# Patient Record
Sex: MALE | Race: OTHER | Employment: UNEMPLOYED | URBAN - METROPOLITAN AREA
[De-identification: names, ages, dates, MRNs, and addresses within clinical notes are randomized per-mention and may not be internally consistent; named-entity substitution may affect disease eponyms.]

---

## 2017-02-28 ENCOUNTER — APPOINTMENT (OUTPATIENT)
Dept: CV DIAGNOSTICS | Facility: HOSPITAL | Age: 52
DRG: 309 | End: 2017-02-28
Attending: INTERNAL MEDICINE
Payer: MEDICAID

## 2017-02-28 ENCOUNTER — APPOINTMENT (OUTPATIENT)
Dept: GENERAL RADIOLOGY | Facility: HOSPITAL | Age: 52
DRG: 309 | End: 2017-02-28
Attending: EMERGENCY MEDICINE
Payer: MEDICAID

## 2017-02-28 ENCOUNTER — HOSPITAL ENCOUNTER (INPATIENT)
Facility: HOSPITAL | Age: 52
LOS: 1 days | Discharge: HOME | DRG: 309 | End: 2017-03-01
Attending: EMERGENCY MEDICINE | Admitting: INTERNAL MEDICINE
Payer: MEDICAID

## 2017-02-28 DIAGNOSIS — I48.91 ATRIAL FIBRILLATION WITH RVR (HCC): Primary | ICD-10-CM

## 2017-02-28 DIAGNOSIS — F10.10 ALCOHOL ABUSE: ICD-10-CM

## 2017-02-28 PROBLEM — I10 ESSENTIAL HYPERTENSION: Status: ACTIVE | Noted: 2017-02-28

## 2017-02-28 PROBLEM — R73.9 HYPERGLYCEMIA: Status: ACTIVE | Noted: 2017-02-28

## 2017-02-28 LAB
ALBUMIN SERPL BCP-MCNC: 3.5 G/DL (ref 3.5–4.8)
ALBUMIN/GLOB SERPL: 1 {RATIO} (ref 1–2)
ALP SERPL-CCNC: 73 U/L (ref 32–100)
ALT SERPL-CCNC: 49 U/L (ref 17–63)
ANION GAP SERPL CALC-SCNC: 6 MMOL/L (ref 0–18)
ANION GAP SERPL CALC-SCNC: 9 MMOL/L (ref 0–18)
APTT PPP: 31.1 SECONDS (ref 23.2–35.3)
AST SERPL-CCNC: 33 U/L (ref 15–41)
BASOPHILS # BLD: 0.1 K/UL (ref 0–0.2)
BASOPHILS NFR BLD: 1 %
BILIRUB SERPL-MCNC: 0.9 MG/DL (ref 0.3–1.2)
BUN SERPL-MCNC: 13 MG/DL (ref 8–20)
BUN SERPL-MCNC: 14 MG/DL (ref 8–20)
BUN/CREAT SERPL: 18.3 (ref 10–20)
BUN/CREAT SERPL: 18.9 (ref 10–20)
CALCIUM SERPL-MCNC: 8.3 MG/DL (ref 8.5–10.5)
CALCIUM SERPL-MCNC: 8.5 MG/DL (ref 8.5–10.5)
CHLORIDE SERPL-SCNC: 106 MMOL/L (ref 95–110)
CHLORIDE SERPL-SCNC: 110 MMOL/L (ref 95–110)
CO2 SERPL-SCNC: 24 MMOL/L (ref 22–32)
CO2 SERPL-SCNC: 26 MMOL/L (ref 22–32)
CREAT SERPL-MCNC: 0.71 MG/DL (ref 0.5–1.5)
CREAT SERPL-MCNC: 0.74 MG/DL (ref 0.5–1.5)
EOSINOPHIL # BLD: 0.1 K/UL (ref 0–0.7)
EOSINOPHIL NFR BLD: 1 %
ERYTHROCYTE [DISTWIDTH] IN BLOOD BY AUTOMATED COUNT: 15.9 % (ref 11–15)
GLOBULIN PLAS-MCNC: 3.6 G/DL (ref 2.5–3.7)
GLUCOSE SERPL-MCNC: 117 MG/DL (ref 70–99)
GLUCOSE SERPL-MCNC: 97 MG/DL (ref 70–99)
HBA1C MFR BLD: 6 % (ref 4–6)
HCT VFR BLD AUTO: 44.7 % (ref 41–52)
HGB BLD-MCNC: 15.1 G/DL (ref 13.5–17.5)
INR BLD: 1.1 (ref 0.9–1.2)
LYMPHOCYTES # BLD: 1.6 K/UL (ref 1–4)
LYMPHOCYTES NFR BLD: 20 %
MAGNESIUM SERPL-MCNC: 1.7 MG/DL (ref 1.8–2.5)
MCH RBC QN AUTO: 29.9 PG (ref 27–32)
MCHC RBC AUTO-ENTMCNC: 33.8 G/DL (ref 32–37)
MCV RBC AUTO: 88.3 FL (ref 80–100)
MONOCYTES # BLD: 0.7 K/UL (ref 0–1)
MONOCYTES NFR BLD: 9 %
NEUTROPHILS # BLD AUTO: 5.3 K/UL (ref 1.8–7.7)
NEUTROPHILS NFR BLD: 68 %
OSMOLALITY UR CALC.SUM OF ELEC: 292 MOSM/KG (ref 275–295)
OSMOLALITY UR CALC.SUM OF ELEC: 292 MOSM/KG (ref 275–295)
PLATELET # BLD AUTO: 204 K/UL (ref 140–400)
PMV BLD AUTO: 9.3 FL (ref 7.4–10.3)
POTASSIUM SERPL-SCNC: 3.5 MMOL/L (ref 3.3–5.1)
POTASSIUM SERPL-SCNC: 3.8 MMOL/L (ref 3.3–5.1)
PROT SERPL-MCNC: 7.1 G/DL (ref 5.9–8.4)
PROTHROMBIN TIME: 13.7 SECONDS (ref 11.8–14.5)
RBC # BLD AUTO: 5.06 M/UL (ref 4.5–5.9)
SODIUM SERPL-SCNC: 140 MMOL/L (ref 136–144)
SODIUM SERPL-SCNC: 141 MMOL/L (ref 136–144)
TROPONIN I SERPL-MCNC: 0.01 NG/ML (ref ?–0.03)
TSH SERPL-ACNC: 2.09 UIU/ML (ref 0.34–5.6)
WBC # BLD AUTO: 7.8 K/UL (ref 4–11)

## 2017-02-28 PROCEDURE — 84132 ASSAY OF SERUM POTASSIUM: CPT | Performed by: INTERNAL MEDICINE

## 2017-02-28 PROCEDURE — 71010 XR CHEST AP PORTABLE  (CPT=71010): CPT

## 2017-02-28 PROCEDURE — 96376 TX/PRO/DX INJ SAME DRUG ADON: CPT

## 2017-02-28 PROCEDURE — 83735 ASSAY OF MAGNESIUM: CPT | Performed by: HOSPITALIST

## 2017-02-28 PROCEDURE — 96365 THER/PROPH/DIAG IV INF INIT: CPT

## 2017-02-28 PROCEDURE — 93306 TTE W/DOPPLER COMPLETE: CPT

## 2017-02-28 PROCEDURE — 99285 EMERGENCY DEPT VISIT HI MDM: CPT

## 2017-02-28 PROCEDURE — 83036 HEMOGLOBIN GLYCOSYLATED A1C: CPT | Performed by: INTERNAL MEDICINE

## 2017-02-28 PROCEDURE — 80307 DRUG TEST PRSMV CHEM ANLYZR: CPT | Performed by: HOSPITALIST

## 2017-02-28 PROCEDURE — 85730 THROMBOPLASTIN TIME PARTIAL: CPT | Performed by: HOSPITALIST

## 2017-02-28 PROCEDURE — 96366 THER/PROPH/DIAG IV INF ADDON: CPT

## 2017-02-28 PROCEDURE — 80048 BASIC METABOLIC PNL TOTAL CA: CPT | Performed by: EMERGENCY MEDICINE

## 2017-02-28 PROCEDURE — 85610 PROTHROMBIN TIME: CPT | Performed by: HOSPITALIST

## 2017-02-28 PROCEDURE — 93005 ELECTROCARDIOGRAM TRACING: CPT

## 2017-02-28 PROCEDURE — 80053 COMPREHEN METABOLIC PANEL: CPT | Performed by: EMERGENCY MEDICINE

## 2017-02-28 PROCEDURE — 84443 ASSAY THYROID STIM HORMONE: CPT | Performed by: INTERNAL MEDICINE

## 2017-02-28 PROCEDURE — 93306 TTE W/DOPPLER COMPLETE: CPT | Performed by: INTERNAL MEDICINE

## 2017-02-28 PROCEDURE — 84484 ASSAY OF TROPONIN QUANT: CPT | Performed by: EMERGENCY MEDICINE

## 2017-02-28 PROCEDURE — 93010 ELECTROCARDIOGRAM REPORT: CPT | Performed by: EMERGENCY MEDICINE

## 2017-02-28 PROCEDURE — 85025 COMPLETE CBC W/AUTO DIFF WBC: CPT | Performed by: EMERGENCY MEDICINE

## 2017-02-28 RX ORDER — FOLIC ACID 1 MG/1
1 TABLET ORAL DAILY
Status: DISCONTINUED | OUTPATIENT
Start: 2017-02-28 | End: 2017-03-01

## 2017-02-28 RX ORDER — MELATONIN
100 DAILY
Status: DISCONTINUED | OUTPATIENT
Start: 2017-03-01 | End: 2017-03-01

## 2017-02-28 RX ORDER — ACETAMINOPHEN 325 MG/1
650 TABLET ORAL EVERY 6 HOURS PRN
Status: DISCONTINUED | OUTPATIENT
Start: 2017-02-28 | End: 2017-03-01

## 2017-02-28 RX ORDER — IBUPROFEN 600 MG/1
1800 TABLET ORAL
Status: ON HOLD | COMMUNITY
End: 2017-03-01

## 2017-02-28 RX ORDER — DILTIAZEM HYDROCHLORIDE 5 MG/ML
INJECTION INTRAVENOUS
Status: COMPLETED
Start: 2017-02-28 | End: 2017-02-28

## 2017-02-28 RX ORDER — MELATONIN
100 ONCE
Status: COMPLETED | OUTPATIENT
Start: 2017-02-28 | End: 2017-02-28

## 2017-02-28 RX ORDER — DILTIAZEM HYDROCHLORIDE 5 MG/ML
10 INJECTION INTRAVENOUS ONCE
Status: COMPLETED | OUTPATIENT
Start: 2017-02-28 | End: 2017-02-28

## 2017-02-28 RX ORDER — MAGNESIUM OXIDE 400 MG (241.3 MG MAGNESIUM) TABLET
400 TABLET ONCE
Status: COMPLETED | OUTPATIENT
Start: 2017-02-28 | End: 2017-02-28

## 2017-02-28 RX ORDER — BISACODYL 10 MG
10 SUPPOSITORY, RECTAL RECTAL
Status: DISCONTINUED | OUTPATIENT
Start: 2017-02-28 | End: 2017-03-01

## 2017-02-28 RX ORDER — POTASSIUM CHLORIDE 20 MEQ/1
40 TABLET, EXTENDED RELEASE ORAL EVERY 4 HOURS
Status: COMPLETED | OUTPATIENT
Start: 2017-02-28 | End: 2017-02-28

## 2017-02-28 RX ORDER — POLYETHYLENE GLYCOL 3350 17 G/17G
17 POWDER, FOR SOLUTION ORAL DAILY PRN
Status: DISCONTINUED | OUTPATIENT
Start: 2017-02-28 | End: 2017-03-01

## 2017-02-28 RX ORDER — MULTIPLE VITAMINS W/ MINERALS TAB 9MG-400MCG
1 TAB ORAL DAILY
Status: DISCONTINUED | OUTPATIENT
Start: 2017-02-28 | End: 2017-03-01

## 2017-02-28 RX ORDER — ONDANSETRON 2 MG/ML
4 INJECTION INTRAMUSCULAR; INTRAVENOUS EVERY 6 HOURS PRN
Status: DISCONTINUED | OUTPATIENT
Start: 2017-02-28 | End: 2017-03-01

## 2017-02-28 RX ORDER — LORAZEPAM 2 MG/ML
1 INJECTION INTRAMUSCULAR
Status: DISCONTINUED | OUTPATIENT
Start: 2017-02-28 | End: 2017-03-01

## 2017-02-28 RX ORDER — LOSARTAN POTASSIUM 100 MG/1
100 TABLET ORAL DAILY
Status: DISCONTINUED | OUTPATIENT
Start: 2017-02-28 | End: 2017-03-01

## 2017-02-28 RX ORDER — DOCUSATE SODIUM 100 MG/1
100 CAPSULE, LIQUID FILLED ORAL 2 TIMES DAILY
Status: DISCONTINUED | OUTPATIENT
Start: 2017-02-28 | End: 2017-03-01

## 2017-02-28 RX ORDER — LORAZEPAM 1 MG/1
1 TABLET ORAL
Status: DISCONTINUED | OUTPATIENT
Start: 2017-02-28 | End: 2017-03-01

## 2017-02-28 RX ORDER — LORAZEPAM 2 MG/1
2 TABLET ORAL
Status: DISCONTINUED | OUTPATIENT
Start: 2017-02-28 | End: 2017-03-01

## 2017-02-28 RX ORDER — HEPARIN SODIUM 5000 [USP'U]/ML
5000 INJECTION, SOLUTION INTRAVENOUS; SUBCUTANEOUS EVERY 8 HOURS
Status: DISCONTINUED | OUTPATIENT
Start: 2017-02-28 | End: 2017-03-01

## 2017-02-28 RX ORDER — LORAZEPAM 2 MG/ML
2 INJECTION INTRAMUSCULAR
Status: DISCONTINUED | OUTPATIENT
Start: 2017-02-28 | End: 2017-03-01
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
## 2017-02-28 NOTE — ED INITIAL ASSESSMENT (HPI)
Pt stated that he experience SOB and palpitation starting at 10 pm last night associated with dizziness, denies chest pain. Pt stated that he also experience fever and chills starting last night.

## 2017-02-28 NOTE — H&P
[address] Patient Status:  Inpatient    1965 MRN [id number]   Location [facility] 3W/SW Attending Sohan Blanco MD   Hosp Day # 0 KEELY Bunn     Date:  2017  Date of Denies arthralgias and myalgias   NEURO: Denies weakness, numbness, headaches, lightheadedness, dizziness   PSYCH: Denies symptoms of depression and anxiety   HEMATOLOGY:  Denies h/o anemia; denies bruising or excessive bleeding   ENDOCRINE:  Denies excess Alcohol abuse    Atrial fibrillation ([facility] [address]    Essential hypertension  Resolved Problems:    * No resolved hospital problems. *      - afib management per cardiology.   - on metoprolol for rate control.  - monitor off cardizem drip  - cont eliquis  - cont lo

## 2017-02-28 NOTE — ED NOTES
Pt remained tachycardic despite 2nd diltiazem 10 mg bolus. Dr Mary Frias notified. Diltiazem infusion ordered and started. Pt updated on plan of care. Doris Friedman, primary RN notified.
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
## 2017-03-01 VITALS
DIASTOLIC BLOOD PRESSURE: 80 MMHG | WEIGHT: 315 LBS | SYSTOLIC BLOOD PRESSURE: 157 MMHG | HEIGHT: 70 IN | HEART RATE: 72 BPM | RESPIRATION RATE: 16 BRPM | OXYGEN SATURATION: 98 % | BODY MASS INDEX: 45.1 KG/M2 | TEMPERATURE: 98 F

## 2017-03-01 LAB
ANION GAP SERPL CALC-SCNC: 7 MMOL/L (ref 0–18)
BASOPHILS # BLD: 0 K/UL (ref 0–0.2)
BASOPHILS NFR BLD: 1 %
BUN SERPL-MCNC: 14 MG/DL (ref 8–20)
BUN/CREAT SERPL: 18.7 (ref 10–20)
CALCIUM SERPL-MCNC: 8.6 MG/DL (ref 8.5–10.5)
CHLORIDE SERPL-SCNC: 107 MMOL/L (ref 95–110)
CO2 SERPL-SCNC: 26 MMOL/L (ref 22–32)
CREAT SERPL-MCNC: 0.75 MG/DL (ref 0.5–1.5)
EOSINOPHIL # BLD: 0.1 K/UL (ref 0–0.7)
EOSINOPHIL NFR BLD: 3 %
ERYTHROCYTE [DISTWIDTH] IN BLOOD BY AUTOMATED COUNT: 15.7 % (ref 11–15)
GLUCOSE SERPL-MCNC: 122 MG/DL (ref 70–99)
HCT VFR BLD AUTO: 42.6 % (ref 41–52)
HGB BLD-MCNC: 14.2 G/DL (ref 13.5–17.5)
LYMPHOCYTES # BLD: 1.8 K/UL (ref 1–4)
LYMPHOCYTES NFR BLD: 34 %
MAGNESIUM SERPL-MCNC: 1.8 MG/DL (ref 1.8–2.5)
MCH RBC QN AUTO: 29.8 PG (ref 27–32)
MCHC RBC AUTO-ENTMCNC: 33.3 G/DL (ref 32–37)
MCV RBC AUTO: 89.5 FL (ref 80–100)
MONOCYTES # BLD: 0.8 K/UL (ref 0–1)
MONOCYTES NFR BLD: 15 %
NEUTROPHILS # BLD AUTO: 2.6 K/UL (ref 1.8–7.7)
NEUTROPHILS NFR BLD: 48 %
OSMOLALITY UR CALC.SUM OF ELEC: 292 MOSM/KG (ref 275–295)
PLATELET # BLD AUTO: 182 K/UL (ref 140–400)
PMV BLD AUTO: 9.7 FL (ref 7.4–10.3)
POTASSIUM SERPL-SCNC: 3.8 MMOL/L (ref 3.3–5.1)
POTASSIUM SERPL-SCNC: 4 MMOL/L (ref 3.3–5.1)
RBC # BLD AUTO: 4.77 M/UL (ref 4.5–5.9)
SODIUM SERPL-SCNC: 140 MMOL/L (ref 136–144)
WBC # BLD AUTO: 5.4 K/UL (ref 4–11)

## 2017-03-01 PROCEDURE — 85025 COMPLETE CBC W/AUTO DIFF WBC: CPT | Performed by: HOSPITALIST

## 2017-03-01 PROCEDURE — 80048 BASIC METABOLIC PNL TOTAL CA: CPT | Performed by: HOSPITALIST

## 2017-03-01 PROCEDURE — 83735 ASSAY OF MAGNESIUM: CPT | Performed by: INTERNAL MEDICINE

## 2017-03-01 RX ORDER — METOPROLOL TARTRATE 50 MG/1
50 TABLET, FILM COATED ORAL 2 TIMES DAILY
Qty: 60 TABLET | Refills: 0 | Status: SHIPPED | OUTPATIENT
Start: 2017-03-01 | End: 2017-07-18

## 2017-03-01 RX ORDER — LOSARTAN POTASSIUM 100 MG/1
100 TABLET ORAL DAILY
Qty: 30 TABLET | Refills: 0 | Status: SHIPPED | OUTPATIENT
Start: 2017-03-01 | End: 2017-07-18

## 2017-03-01 RX ORDER — MAGNESIUM OXIDE 400 MG (241.3 MG MAGNESIUM) TABLET
400 TABLET ONCE
Status: COMPLETED | OUTPATIENT
Start: 2017-03-01 | End: 2017-03-01

## 2017-03-01 NOTE — DISCHARGE SUMMARY
[place] [facility] - [facility]    Discharge Summary    Bhumika Dumont Patient Status:  Inpatient    1965 MRN [id number]   Location [facility] 3W/SW Attending No att. providers found   [facility] Day # 1 PCP Gabino Stovall     Date of Admission:  tablet (5 mg total) by mouth 2 (two) times daily. , Normal, Disp-60 tablet, R-0    metoprolol Tartrate 50 MG Oral Tab  Take 1 tablet (50 mg total) by mouth 2 (two) times daily. , Normal, Disp-60 tablet, R-0      Home Meds - Modified    losartan 100 MG Oral T

## 2017-03-01 NOTE — PLAN OF CARE
Problem: Patient/Family Goals  Goal: Patient/Family Long Term Goal  Patient’s Long Term Goal: To get involved in a local [employer] for support to stop drinking alcohol.     Interventions:  - met with patent  - See additional Care Plan goals for specifi

## 2017-03-02 ENCOUNTER — TELEPHONE (OUTPATIENT)
Dept: CARDIOLOGY UNIT | Facility: HOSPITAL | Age: 52
End: 2017-03-02

## 2017-03-07 NOTE — PAYOR COMM NOTE
Attending Physician: No att. providers found    Review Type: ADMISSION   Reviewer:  Ileana Zhong       Date: March 7, 2017 - 3:15 PM  Payor: [address] [place] [address]  Authorization Number: [id number]  Admit date: 2/28/2017  3:03 AM   Adm Current:/67 mmHg  Pulse 110  Temp(Src) 97.8 °F (36.6 °C) (Temporal)  Resp 20  Ht 177.8 cm (5' 10\")  Wt 179.171 kg  BMI 56.68 kg/m2  SpO2 96%        Physical Exam   Constitutional: He is oriented to person, place, and time.  He appears well-deve for these tests on the individual orders. RAINBOW DRAW BLUE   RAINBOW DRAW GOLD   RAINBOW DRAW LAVENDER   RAINBOW DRAW LIGHT GREEN   RAINBOW DRAW DARK GREEN   RAINBOW DRAW LAVENDER TALL (BNP)      EKG    Rate, intervals and axes as noted on EKG Report. presents with:  Dyspnea TORITO SOB (respiratory)      HPI:   This is a 48 yo male with PMH of HTN who presented with palpitations, sob, and chest tightness. He notes he had multiple drinks of alcohol on Saturday night and was hung over on Sunday.   Last night wt loss   ALLERGY/IMMUN: Denies food or seasonal allergies       Physical Exam:   /91 mmHg  Pulse 89  Temp(Src) 97.6 °F (36.4 °C) (Oral)  Resp 20  Ht 5' 10\" (1.778 m)  Wt 393 lb 3.2 oz (178.354 kg)  BMI 56.42 kg/m2  SpO2 98%     GENERAL: [employer] MD  2/28/2017           Electronically signed by Julissa Farnsworth MD on 2/28/2017  1:11 PM        REVIEWER COMMENTS:     OTHER:

## 2017-04-13 ENCOUNTER — PRIOR ORIGINAL RECORDS (OUTPATIENT)
Dept: OTHER | Age: 52
End: 2017-04-13

## 2017-04-13 LAB
ANALYZER ANC (IANC): NORMAL
ANION GAP SERPL CALC-SCNC: 14 MMOL/L (ref 10–20)
BASOPHILS # BLD: 0 THOUSAND/MCL (ref 0–0.3)
BASOPHILS NFR BLD: 0 %
BNP SERPL-MCNC: 85 PG/ML
BUN SERPL-MCNC: 16 MG/DL (ref 6–20)
BUN/CREAT SERPL: 23 (ref 7–25)
CALCIUM SERPL-MCNC: 8.6 MG/DL (ref 8.4–10.2)
CHLORIDE: 106 MMOL/L (ref 98–107)
CO2 SERPL-SCNC: 26 MMOL/L (ref 21–32)
CREAT SERPL-MCNC: 0.7 MG/DL (ref 0.67–1.17)
D DIMER PPP FEU-MCNC: 0.35 MG/L FEU
DIFFERENTIAL METHOD BLD: NORMAL
EOSINOPHIL # BLD: 0.3 THOUSAND/MCL (ref 0.1–0.5)
EOSINOPHIL NFR BLD: 4 %
ERYTHROCYTE [DISTWIDTH] IN BLOOD: 14.9 % (ref 11–15)
GLUCOSE SERPL-MCNC: 133 MG/DL (ref 65–99)
HEMATOCRIT: 41 % (ref 39–51)
HGB BLD-MCNC: 13.9 GM/DL (ref 13–17)
LYMPHOCYTES # BLD: 2.5 THOUSAND/MCL (ref 1–4)
LYMPHOCYTES NFR BLD: 33 %
MCH RBC QN AUTO: 30.2 PG (ref 26–34)
MCHC RBC AUTO-ENTMCNC: 33.9 GM/DL (ref 32–36.5)
MCV RBC AUTO: 89.1 FL (ref 78–100)
MONOCYTES # BLD: 0.9 THOUSAND/MCL (ref 0.3–0.9)
MONOCYTES NFR BLD: 11 %
NEUTROPHILS # BLD: 4.1 THOUSAND/MCL (ref 1.8–7.7)
NEUTROPHILS NFR BLD: 52 %
NEUTS SEG NFR BLD: NORMAL %
PERCENT NRBC: NORMAL
PLATELET # BLD: 200 THOUSAND/MCL (ref 140–450)
POTASSIUM SERPL-SCNC: 4.1 MMOL/L (ref 3.4–5.1)
RBC # BLD: 4.6 MILLION/MCL (ref 4.5–5.9)
SODIUM SERPL-SCNC: 142 MMOL/L (ref 135–145)
TROPONIN I SERPL HS-MCNC: <0.02 NG/ML
WBC # BLD: 7.8 THOUSAND/MCL (ref 4.2–11)

## 2017-04-14 ENCOUNTER — PRIOR ORIGINAL RECORDS (OUTPATIENT)
Dept: OTHER | Age: 52
End: 2017-04-14

## 2017-04-14 ENCOUNTER — HOSPITAL (OUTPATIENT)
Dept: OTHER | Age: 52
End: 2017-04-14
Attending: FAMILY MEDICINE

## 2017-04-14 LAB
ALBUMIN SERPL-MCNC: 3.3 GM/DL (ref 3.6–5.1)
ALBUMIN/GLOB SERPL: 0.8 {RATIO} (ref 1–2.4)
ALP SERPL-CCNC: 82 UNIT/L (ref 45–117)
ALT SERPL-CCNC: 34 UNIT/L
ANION GAP SERPL CALC-SCNC: 13 MMOL/L (ref 10–20)
AST SERPL-CCNC: 20 UNIT/L
BILIRUB SERPL-MCNC: 0.3 MG/DL (ref 0.2–1)
BUN SERPL-MCNC: 14 MG/DL (ref 6–20)
BUN/CREAT SERPL: 22 (ref 7–25)
CALCIUM SERPL-MCNC: 8.4 MG/DL (ref 8.4–10.2)
CHLORIDE: 108 MMOL/L (ref 98–107)
CK SERPL-CCNC: 234 UNIT/L (ref 39–308)
CK SERPL-CCNC: 252 UNIT/L (ref 39–308)
CO2 SERPL-SCNC: 24 MMOL/L (ref 21–32)
CREAT SERPL-MCNC: 0.64 MG/DL (ref 0.67–1.17)
GLOBULIN SER-MCNC: 3.9 GM/DL (ref 2–4)
GLUCOSE SERPL-MCNC: 115 MG/DL (ref 65–99)
POTASSIUM SERPL-SCNC: 4.2 MMOL/L (ref 3.4–5.1)
PROT SERPL-MCNC: 7.2 GM/DL (ref 6.4–8.2)
SODIUM SERPL-SCNC: 141 MMOL/L (ref 135–145)
TROPONIN I SERPL HS-MCNC: <0.02 NG/ML
TROPONIN I SERPL HS-MCNC: <0.02 NG/ML

## 2017-04-15 ENCOUNTER — DIAGNOSTIC TRANS (OUTPATIENT)
Dept: OTHER | Age: 52
End: 2017-04-15

## 2017-06-29 PROBLEM — R73.9 HYPERGLYCEMIA: Status: RESOLVED | Noted: 2017-02-28 | Resolved: 2017-06-29

## 2017-06-29 PROBLEM — I48.91 ATRIAL FIBRILLATION WITH RVR (HCC): Status: RESOLVED | Noted: 2017-02-28 | Resolved: 2017-06-29

## 2017-06-29 PROBLEM — I48.91 ATRIAL FIBRILLATION (HCC): Status: RESOLVED | Noted: 2017-02-28 | Resolved: 2017-06-29

## 2017-06-29 PROBLEM — E78.5 HYPERLIPIDEMIA, UNSPECIFIED HYPERLIPIDEMIA TYPE: Status: ACTIVE | Noted: 2017-06-29

## 2017-06-30 ENCOUNTER — APPOINTMENT (OUTPATIENT)
Dept: LAB | Age: 52
End: 2017-06-30
Attending: INTERNAL MEDICINE
Payer: MEDICAID

## 2017-06-30 PROCEDURE — 36415 COLL VENOUS BLD VENIPUNCTURE: CPT

## 2017-06-30 PROCEDURE — 80053 COMPREHEN METABOLIC PANEL: CPT | Performed by: INTERNAL MEDICINE

## 2017-06-30 PROCEDURE — 84153 ASSAY OF PSA TOTAL: CPT

## 2017-06-30 PROCEDURE — 80061 LIPID PANEL: CPT | Performed by: INTERNAL MEDICINE

## 2017-07-03 ENCOUNTER — PRIOR ORIGINAL RECORDS (OUTPATIENT)
Dept: OTHER | Age: 52
End: 2017-07-03

## 2017-07-11 LAB
ALBUMIN: 3.3 G/DL
ALKALINE PHOSPHATATE(ALK PHOS): 82 IU/L
BILIRUBIN TOTAL: 0.3 MG/DL
BNP: 85 PMOL/L
CALCIUM: 8.4 MG/DL
CREATININE KINASE: 234 U/L
CREATININE KINASE: 252 U/L
GLOBULIN: 3.9 G/DL
GLUCOSE: 115 MG/DL
HEMATOCRIT: 41 %
HEMOGLOBIN: 13.9 G/DL
PLATELETS: 200 K/UL
PROTEIN, TOTAL: 7.2 G/DL
RED BLOOD COUNT: 4.6 X 10-6/U
SGOT (AST): 20 IU/L
SGPT (ALT): 34 IU/L
WHITE BLOOD COUNT: 7.8 X 10-3/U

## 2017-07-18 PROBLEM — G89.29 CHRONIC PAIN OF BOTH KNEES: Status: ACTIVE | Noted: 2017-07-18

## 2017-07-18 PROBLEM — G89.29 CHRONIC MIDLINE LOW BACK PAIN WITHOUT SCIATICA: Status: ACTIVE | Noted: 2017-07-18

## 2017-07-18 PROBLEM — M25.561 CHRONIC PAIN OF BOTH KNEES: Status: ACTIVE | Noted: 2017-07-18

## 2017-07-18 PROBLEM — M54.50 CHRONIC MIDLINE LOW BACK PAIN WITHOUT SCIATICA: Status: ACTIVE | Noted: 2017-07-18

## 2017-07-18 PROBLEM — M25.562 CHRONIC PAIN OF BOTH KNEES: Status: ACTIVE | Noted: 2017-07-18

## 2017-07-20 ENCOUNTER — PRIOR ORIGINAL RECORDS (OUTPATIENT)
Dept: OTHER | Age: 52
End: 2017-07-20

## 2017-09-17 ENCOUNTER — HOSPITAL ENCOUNTER (EMERGENCY)
Facility: HOSPITAL | Age: 52
Discharge: HOME | End: 2017-09-17
Attending: EMERGENCY MEDICINE
Payer: MEDICAID

## 2017-09-17 ENCOUNTER — APPOINTMENT (OUTPATIENT)
Dept: GENERAL RADIOLOGY | Facility: HOSPITAL | Age: 52
End: 2017-09-17
Payer: MEDICAID

## 2017-09-17 VITALS
HEIGHT: 69 IN | SYSTOLIC BLOOD PRESSURE: 160 MMHG | TEMPERATURE: 99 F | DIASTOLIC BLOOD PRESSURE: 89 MMHG | OXYGEN SATURATION: 96 % | BODY MASS INDEX: 46.65 KG/M2 | HEART RATE: 84 BPM | RESPIRATION RATE: 19 BRPM | WEIGHT: 315 LBS

## 2017-09-17 DIAGNOSIS — F10.10 ALCOHOL ABUSE: ICD-10-CM

## 2017-09-17 DIAGNOSIS — R00.2 PALPITATIONS: Primary | ICD-10-CM

## 2017-09-17 LAB
ALBUMIN SERPL BCP-MCNC: 3.6 G/DL (ref 3.5–4.8)
ALP SERPL-CCNC: 72 U/L (ref 32–100)
ALT SERPL-CCNC: 30 U/L (ref 17–63)
ANION GAP SERPL CALC-SCNC: 9 MMOL/L (ref 0–18)
AST SERPL-CCNC: 36 U/L (ref 15–41)
BASOPHILS # BLD: 0 K/UL (ref 0–0.2)
BASOPHILS NFR BLD: 1 %
BILIRUB DIRECT SERPL-MCNC: 0.2 MG/DL (ref 0–0.2)
BILIRUB SERPL-MCNC: 0.9 MG/DL (ref 0.3–1.2)
BUN SERPL-MCNC: 14 MG/DL (ref 8–20)
BUN/CREAT SERPL: 20 (ref 10–20)
CALCIUM SERPL-MCNC: 8.3 MG/DL (ref 8.5–10.5)
CHLORIDE SERPL-SCNC: 107 MMOL/L (ref 95–110)
CO2 SERPL-SCNC: 25 MMOL/L (ref 22–32)
CREAT SERPL-MCNC: 0.7 MG/DL (ref 0.5–1.5)
EOSINOPHIL # BLD: 0 K/UL (ref 0–0.7)
EOSINOPHIL NFR BLD: 1 %
ERYTHROCYTE [DISTWIDTH] IN BLOOD BY AUTOMATED COUNT: 15 % (ref 11–15)
ETHANOL SERPL-MCNC: 84 MG/DL
GLUCOSE SERPL-MCNC: 102 MG/DL (ref 70–99)
HCT VFR BLD AUTO: 41.7 % (ref 41–52)
HGB BLD-MCNC: 14.2 G/DL (ref 13.5–17.5)
INR BLD: 1.2 (ref 0.9–1.2)
LIPASE SERPL-CCNC: 19 U/L (ref 22–51)
LYMPHOCYTES # BLD: 1.7 K/UL (ref 1–4)
LYMPHOCYTES NFR BLD: 32 %
MCH RBC QN AUTO: 30.4 PG (ref 27–32)
MCHC RBC AUTO-ENTMCNC: 34 G/DL (ref 32–37)
MCV RBC AUTO: 89.4 FL (ref 80–100)
MONOCYTES # BLD: 0.7 K/UL (ref 0–1)
MONOCYTES NFR BLD: 13 %
NEUTROPHILS # BLD AUTO: 2.7 K/UL (ref 1.8–7.7)
NEUTROPHILS NFR BLD: 53 %
OSMOLALITY UR CALC.SUM OF ELEC: 293 MOSM/KG (ref 275–295)
PLATELET # BLD AUTO: 162 K/UL (ref 140–400)
PMV BLD AUTO: 8.8 FL (ref 7.4–10.3)
POTASSIUM SERPL-SCNC: 3.5 MMOL/L (ref 3.3–5.1)
PROT SERPL-MCNC: 6.9 G/DL (ref 5.9–8.4)
PROTHROMBIN TIME: 14.3 SECONDS (ref 11.8–14.5)
RBC # BLD AUTO: 4.67 M/UL (ref 4.5–5.9)
SODIUM SERPL-SCNC: 141 MMOL/L (ref 136–144)
TROPONIN I SERPL-MCNC: 0.03 NG/ML (ref ?–0.03)
WBC # BLD AUTO: 5.2 K/UL (ref 4–11)

## 2017-09-17 PROCEDURE — 93010 ELECTROCARDIOGRAM REPORT: CPT | Performed by: EMERGENCY MEDICINE

## 2017-09-17 PROCEDURE — 80076 HEPATIC FUNCTION PANEL: CPT | Performed by: EMERGENCY MEDICINE

## 2017-09-17 PROCEDURE — 83690 ASSAY OF LIPASE: CPT | Performed by: EMERGENCY MEDICINE

## 2017-09-17 PROCEDURE — 36415 COLL VENOUS BLD VENIPUNCTURE: CPT

## 2017-09-17 PROCEDURE — 80320 DRUG SCREEN QUANTALCOHOLS: CPT | Performed by: EMERGENCY MEDICINE

## 2017-09-17 PROCEDURE — 85610 PROTHROMBIN TIME: CPT | Performed by: EMERGENCY MEDICINE

## 2017-09-17 PROCEDURE — 85025 COMPLETE CBC W/AUTO DIFF WBC: CPT | Performed by: EMERGENCY MEDICINE

## 2017-09-17 PROCEDURE — 99285 EMERGENCY DEPT VISIT HI MDM: CPT

## 2017-09-17 PROCEDURE — 93005 ELECTROCARDIOGRAM TRACING: CPT

## 2017-09-17 PROCEDURE — 84484 ASSAY OF TROPONIN QUANT: CPT | Performed by: EMERGENCY MEDICINE

## 2017-09-17 PROCEDURE — 80048 BASIC METABOLIC PNL TOTAL CA: CPT | Performed by: EMERGENCY MEDICINE

## 2017-09-17 PROCEDURE — 71010 XR CHEST AP PORTABLE  (CPT=71010): CPT | Performed by: EMERGENCY MEDICINE

## 2017-09-17 RX ORDER — LORAZEPAM 1 MG/1
1 TABLET ORAL ONCE
Status: COMPLETED | OUTPATIENT
Start: 2017-09-17 | End: 2017-09-17

## 2017-09-17 RX ORDER — LORAZEPAM 1 MG/1
1 TABLET ORAL 2 TIMES DAILY PRN
Qty: 6 TABLET | Refills: 0 | Status: SHIPPED | OUTPATIENT
Start: 2017-09-17 | End: 2017-09-20

## 2017-09-17 NOTE — ED NOTES
Pt verbalized complaints that he still doesn't feel well. C/o chest palpitations, dizziness, nausea and feeling anxious.  Dr. Savannah Granda notified, orders received for oral ativan

## 2017-09-17 NOTE — ED NOTES
Pt given discharge instructions, prescriptions, work note and follow up. Questions answered and pt verbalized understanding.  Pt discharged home with family, ambulated out of ed with steady gait

## 2017-09-17 NOTE — ED INITIAL ASSESSMENT (HPI)
Pt presents to the er via ems with c/o dizziness, heart palpitations, nausea, and possible alcohol withdrawal   Pt reports that his last drink was 2 days ago.  Pt reports hx of binge drinking  Denies SI/HI

## 2017-09-17 NOTE — ED PROVIDER NOTES
Patient Seen in: [facility] AND [facility] Emergency Department    History   Patient presents with:  Dizziness (neurologic)    Stated Complaint: dizziness, nausea, heart palpitations.  stopped drinking 2 days    HPI    Patient is a 59-year-old male with a history Nose: Nose normal.   Mouth/Throat: Oropharynx is clear and moist.   Eyes: Conjunctivae and EOM are normal. Pupils are equal, round, and reactive to light. Neck: Neck supple.    Cardiovascular: Normal rate, regular rhythm, normal heart sounds and intact these tests on the individual orders. RAINBOW DRAW BLUE   RAINBOW DRAW LAVENDER   RAINBOW LIME GREEN   RAINBOW DRAW LIGHT GREEN   RAINBOW DRAW GOLD   RAINBOW DRAW LAVENDER TALL (BNP)     EKG    Rate, intervals and axes as noted on EKG Report.   Rate: 87

## 2017-09-22 ENCOUNTER — DIAGNOSTIC TRANS (OUTPATIENT)
Dept: OTHER | Age: 52
End: 2017-09-22

## 2017-09-22 ENCOUNTER — HOSPITAL (OUTPATIENT)
Dept: OTHER | Age: 52
End: 2017-09-22
Attending: HOSPITALIST

## 2017-09-22 ENCOUNTER — CHARTING TRANS (OUTPATIENT)
Dept: OTHER | Age: 52
End: 2017-09-22

## 2017-09-22 LAB
ALBUMIN SERPL-MCNC: 2.9 GM/DL (ref 3.6–5.1)
ALBUMIN SERPL-MCNC: 3.1 GM/DL (ref 3.6–5.1)
ALP SERPL-CCNC: 133 UNIT/L (ref 45–117)
ALP SERPL-CCNC: 95 UNIT/L (ref 45–117)
ALT SERPL-CCNC: 89 UNIT/L
ALT SERPL-CCNC: 98 UNIT/L
ANALYZER ANC (IANC): ABNORMAL
ANION GAP SERPL CALC-SCNC: 12 MMOL/L (ref 10–20)
AST SERPL-CCNC: 49 UNIT/L
AST SERPL-CCNC: 65 UNIT/L
BASOPHILS # BLD: 0 THOUSAND/MCL (ref 0–0.3)
BASOPHILS NFR BLD: 1 %
BILIRUB CONJ SERPL-MCNC: 0.1 MG/DL (ref 0–0.2)
BILIRUB CONJ SERPL-MCNC: 0.1 MG/DL (ref 0–0.2)
BILIRUB SERPL-MCNC: 0.3 MG/DL (ref 0.2–1)
BILIRUB SERPL-MCNC: 0.5 MG/DL (ref 0.2–1)
BNP SERPL-MCNC: 81 PG/ML
BUN SERPL-MCNC: 14 MG/DL (ref 6–20)
BUN/CREAT SERPL: 23 (ref 7–25)
CALCIUM SERPL-MCNC: 8.3 MG/DL (ref 8.4–10.2)
CHLORIDE: 106 MMOL/L (ref 98–107)
CO2 SERPL-SCNC: 26 MMOL/L (ref 21–32)
CREAT SERPL-MCNC: 0.52 MG/DL (ref 0.67–1.17)
CREAT SERPL-MCNC: 0.62 MG/DL (ref 0.67–1.17)
DIFFERENTIAL METHOD BLD: ABNORMAL
EOSINOPHIL # BLD: 0.3 THOUSAND/MCL (ref 0.1–0.5)
EOSINOPHIL NFR BLD: 3 %
ERYTHROCYTE [DISTWIDTH] IN BLOOD: 14.4 % (ref 11–15)
ETHANOL SERPL-MCNC: NORMAL MG/DL
GLUCOSE SERPL-MCNC: 136 MG/DL (ref 65–99)
HEMATOCRIT: 39.9 % (ref 39–51)
HGB BLD-MCNC: 13.9 GM/DL (ref 13–17)
LYMPHOCYTES # BLD: 2.3 THOUSAND/MCL (ref 1–4)
LYMPHOCYTES NFR BLD: 26 %
MAGNESIUM SERPL-MCNC: 1.6 MG/DL (ref 1.7–2.4)
MAGNESIUM SERPL-MCNC: 2 MG/DL (ref 1.7–2.4)
MCH RBC QN AUTO: 31.2 PG (ref 26–34)
MCHC RBC AUTO-ENTMCNC: 34.8 GM/DL (ref 32–36.5)
MCV RBC AUTO: 89.7 FL (ref 78–100)
MONOCYTES # BLD: 0.8 THOUSAND/MCL (ref 0.3–0.9)
MONOCYTES NFR BLD: 9 %
NEUTROPHILS # BLD: 5.4 THOUSAND/MCL (ref 1.8–7.7)
NEUTROPHILS NFR BLD: 61 %
NEUTS SEG NFR BLD: ABNORMAL %
PERCENT NRBC: ABNORMAL
PLATELET # BLD: 172 THOUSAND/MCL (ref 140–450)
POTASSIUM SERPL-SCNC: 3.8 MMOL/L (ref 3.4–5.1)
POTASSIUM SERPL-SCNC: 3.8 MMOL/L (ref 3.4–5.1)
PROT SERPL-MCNC: 6.7 GM/DL (ref 6.4–8.2)
PROT SERPL-MCNC: 7.5 GM/DL (ref 6.4–8.2)
RBC # BLD: 4.45 MILLION/MCL (ref 4.5–5.9)
SODIUM SERPL-SCNC: 140 MMOL/L (ref 135–145)
TROPONIN I SERPL HS-MCNC: 0.03 NG/ML
TSH SERPL-ACNC: 3.01 MCUNIT/ML (ref 0.35–5)
WBC # BLD: 8.8 THOUSAND/MCL (ref 4.2–11)

## 2017-09-23 LAB
ANALYZER ANC (IANC): ABNORMAL
ANION GAP SERPL CALC-SCNC: 11 MMOL/L (ref 10–20)
BASOPHILS # BLD: 0 THOUSAND/MCL (ref 0–0.3)
BASOPHILS NFR BLD: 0 %
BUN SERPL-MCNC: 13 MG/DL (ref 6–20)
BUN/CREAT SERPL: 20 (ref 7–25)
CALCIUM SERPL-MCNC: 8 MG/DL (ref 8.4–10.2)
CHLORIDE: 108 MMOL/L (ref 98–107)
CHOLEST SERPL-MCNC: 164 MG/DL
CHOLEST/HDLC SERPL: 3 {RATIO}
CO2 SERPL-SCNC: 24 MMOL/L (ref 21–32)
CREAT SERPL-MCNC: 0.64 MG/DL (ref 0.67–1.17)
DIFFERENTIAL METHOD BLD: ABNORMAL
EOSINOPHIL # BLD: 0.4 THOUSAND/MCL (ref 0.1–0.5)
EOSINOPHIL NFR BLD: 5 %
ERYTHROCYTE [DISTWIDTH] IN BLOOD: 15.1 % (ref 11–15)
GLUCOSE SERPL-MCNC: 123 MG/DL (ref 65–99)
HDLC SERPL-MCNC: 55 MG/DL
HEMATOCRIT: 40 % (ref 39–51)
HGB BLD-MCNC: 13.2 GM/DL (ref 13–17)
LDLC SERPL CALC-MCNC: 92 MG/DL
LYMPHOCYTES # BLD: 1.8 THOUSAND/MCL (ref 1–4)
LYMPHOCYTES NFR BLD: 23 %
MCH RBC QN AUTO: 30.2 PG (ref 26–34)
MCHC RBC AUTO-ENTMCNC: 33 GM/DL (ref 32–36.5)
MCV RBC AUTO: 91.5 FL (ref 78–100)
MONOCYTES # BLD: 1 THOUSAND/MCL (ref 0.3–0.9)
MONOCYTES NFR BLD: 13 %
NEUTROPHILS # BLD: 4.6 THOUSAND/MCL (ref 1.8–7.7)
NEUTROPHILS NFR BLD: 59 %
NEUTS SEG NFR BLD: ABNORMAL %
NONHDLC SERPL-MCNC: 109 MG/DL
PERCENT NRBC: ABNORMAL
PLATELET # BLD: 160 THOUSAND/MCL (ref 140–450)
POTASSIUM SERPL-SCNC: 4.1 MMOL/L (ref 3.4–5.1)
RBC # BLD: 4.37 MILLION/MCL (ref 4.5–5.9)
SODIUM SERPL-SCNC: 139 MMOL/L (ref 135–145)
TRIGLYCERIDE (TRIGP): 86 MG/DL
WBC # BLD: 7.9 THOUSAND/MCL (ref 4.2–11)

## 2017-11-07 PROBLEM — M54.50 CHRONIC LEFT-SIDED LOW BACK PAIN WITHOUT SCIATICA: Status: ACTIVE | Noted: 2017-11-07

## 2017-11-07 PROBLEM — G89.29 CHRONIC LEFT-SIDED LOW BACK PAIN WITHOUT SCIATICA: Status: ACTIVE | Noted: 2017-11-07

## 2017-11-21 ENCOUNTER — APPOINTMENT (OUTPATIENT)
Dept: CT IMAGING | Facility: HOSPITAL | Age: 52
End: 2017-11-21
Attending: EMERGENCY MEDICINE
Payer: MEDICAID

## 2017-11-21 ENCOUNTER — HOSPITAL ENCOUNTER (EMERGENCY)
Facility: HOSPITAL | Age: 52
Discharge: HOME | End: 2017-11-21
Attending: EMERGENCY MEDICINE
Payer: MEDICAID

## 2017-11-21 VITALS
RESPIRATION RATE: 27 BRPM | BODY MASS INDEX: 36.37 KG/M2 | HEIGHT: 68 IN | HEART RATE: 112 BPM | SYSTOLIC BLOOD PRESSURE: 139 MMHG | WEIGHT: 240 LBS | OXYGEN SATURATION: 96 % | TEMPERATURE: 99 F | DIASTOLIC BLOOD PRESSURE: 66 MMHG

## 2017-11-21 DIAGNOSIS — F41.9 ANXIETY: ICD-10-CM

## 2017-11-21 DIAGNOSIS — R07.89 CHEST PAIN, ATYPICAL: Primary | ICD-10-CM

## 2017-11-21 PROCEDURE — 80048 BASIC METABOLIC PNL TOTAL CA: CPT | Performed by: EMERGENCY MEDICINE

## 2017-11-21 PROCEDURE — 96361 HYDRATE IV INFUSION ADD-ON: CPT

## 2017-11-21 PROCEDURE — 84484 ASSAY OF TROPONIN QUANT: CPT | Performed by: EMERGENCY MEDICINE

## 2017-11-21 PROCEDURE — 71260 CT THORAX DX C+: CPT | Performed by: EMERGENCY MEDICINE

## 2017-11-21 PROCEDURE — 96375 TX/PRO/DX INJ NEW DRUG ADDON: CPT

## 2017-11-21 PROCEDURE — 85025 COMPLETE CBC W/AUTO DIFF WBC: CPT | Performed by: EMERGENCY MEDICINE

## 2017-11-21 PROCEDURE — 96374 THER/PROPH/DIAG INJ IV PUSH: CPT

## 2017-11-21 PROCEDURE — 99285 EMERGENCY DEPT VISIT HI MDM: CPT

## 2017-11-21 PROCEDURE — 93005 ELECTROCARDIOGRAM TRACING: CPT

## 2017-11-21 PROCEDURE — 93010 ELECTROCARDIOGRAM REPORT: CPT | Performed by: EMERGENCY MEDICINE

## 2017-11-21 PROCEDURE — 85379 FIBRIN DEGRADATION QUANT: CPT | Performed by: EMERGENCY MEDICINE

## 2017-11-21 RX ORDER — ALPRAZOLAM 0.25 MG/1
0.25 TABLET ORAL 2 TIMES DAILY PRN
Qty: 10 TABLET | Refills: 0 | Status: SHIPPED | OUTPATIENT
Start: 2017-11-21 | End: 2017-11-26

## 2017-11-21 RX ORDER — ASPIRIN 81 MG/1
324 TABLET, CHEWABLE ORAL ONCE
Status: COMPLETED | OUTPATIENT
Start: 2017-11-21 | End: 2017-11-21

## 2017-11-21 RX ORDER — LORAZEPAM 2 MG/ML
1 INJECTION INTRAMUSCULAR ONCE
Status: COMPLETED | OUTPATIENT
Start: 2017-11-21 | End: 2017-11-21

## 2017-11-21 RX ORDER — ASPIRIN 81 MG/1
324 TABLET, CHEWABLE ORAL ONCE
Status: DISCONTINUED | OUTPATIENT
Start: 2017-11-21 | End: 2017-11-21

## 2017-11-22 NOTE — ED NOTES
DC instructions reviewed w/ patient. He is accepting and comfortably with DC plan and will FU w/ PCP as instructed. Patient educated on prescriptions provided and understands how to safely take medication at home. VSS.  Patients breathing even, non-labored

## 2017-11-22 NOTE — ED PROVIDER NOTES
Patient Seen in: [facility] AND [facility] Emergency Department    History   Patient presents with:  Chest Pain Angina (cardiovascular)  Dyspnea TORITO SOB (respiratory)    Stated Complaint: CP     HPI    58-year-old male with history of spontaneously converted atr around 115, regular rhythm and intact and equal distal pulses. No obvious murmur. Pulmonary/Chest: Effort normal. No respiratory distress. Grossly clear breath sounds bilaterally without crackles wheezing or rhonchi. Abdominal: Soft. Morbidly obese. (BNP)     EKG    Rate, intervals and axes as noted on EKG Report.   Rate: 118  Rhythm: Sinus Rhythm  Reading: No obvious acute ischemia, normal intervals and axis, similar to prior           ED Course as of Nov 21 2254  ------------------------------------- calcified granuloma. CHEST WALL/BONES: There is degenerative disease of the thoracic spine. The chest wall and osseous structures are otherwise unremarkable.   LIMITED ABDOMEN: Diffuse low attenuation seen throughout the liver compatible with fatty infiltr 0.25 MG Oral Tab  Take 1 tablet (0.25 mg total) by mouth 2 (two) times daily as needed for Anxiety.   Qty: 10 tablet Refills: 0

## 2017-12-09 ENCOUNTER — APPOINTMENT (OUTPATIENT)
Dept: GENERAL RADIOLOGY | Facility: HOSPITAL | Age: 52
DRG: 309 | End: 2017-12-09
Attending: EMERGENCY MEDICINE
Payer: MEDICAID

## 2017-12-09 ENCOUNTER — HOSPITAL ENCOUNTER (INPATIENT)
Facility: HOSPITAL | Age: 52
LOS: 3 days | Discharge: HOME | DRG: 309 | End: 2017-12-12
Attending: EMERGENCY MEDICINE | Admitting: INTERNAL MEDICINE
Payer: MEDICAID

## 2017-12-09 DIAGNOSIS — I48.91 ATRIAL FIBRILLATION WITH RAPID VENTRICULAR RESPONSE (HCC): Primary | ICD-10-CM

## 2017-12-09 DIAGNOSIS — F10.920 ALCOHOLIC INTOXICATION WITHOUT COMPLICATION (HCC): ICD-10-CM

## 2017-12-09 PROBLEM — E66.01 MORBID OBESITY (HCC): Status: ACTIVE | Noted: 2017-12-09

## 2017-12-09 PROCEDURE — 80048 BASIC METABOLIC PNL TOTAL CA: CPT | Performed by: EMERGENCY MEDICINE

## 2017-12-09 PROCEDURE — 96375 TX/PRO/DX INJ NEW DRUG ADDON: CPT

## 2017-12-09 PROCEDURE — 84484 ASSAY OF TROPONIN QUANT: CPT | Performed by: EMERGENCY MEDICINE

## 2017-12-09 PROCEDURE — 85025 COMPLETE CBC W/AUTO DIFF WBC: CPT | Performed by: EMERGENCY MEDICINE

## 2017-12-09 PROCEDURE — 99285 EMERGENCY DEPT VISIT HI MDM: CPT

## 2017-12-09 PROCEDURE — 93010 ELECTROCARDIOGRAM REPORT: CPT | Performed by: EMERGENCY MEDICINE

## 2017-12-09 PROCEDURE — 81003 URINALYSIS AUTO W/O SCOPE: CPT | Performed by: EMERGENCY MEDICINE

## 2017-12-09 PROCEDURE — 85610 PROTHROMBIN TIME: CPT | Performed by: INTERNAL MEDICINE

## 2017-12-09 PROCEDURE — 93005 ELECTROCARDIOGRAM TRACING: CPT

## 2017-12-09 PROCEDURE — 96366 THER/PROPH/DIAG IV INF ADDON: CPT

## 2017-12-09 PROCEDURE — 85730 THROMBOPLASTIN TIME PARTIAL: CPT | Performed by: INTERNAL MEDICINE

## 2017-12-09 PROCEDURE — 80320 DRUG SCREEN QUANTALCOHOLS: CPT | Performed by: EMERGENCY MEDICINE

## 2017-12-09 PROCEDURE — 87641 MR-STAPH DNA AMP PROBE: CPT | Performed by: EMERGENCY MEDICINE

## 2017-12-09 PROCEDURE — 71010 XR CHEST AP PORTABLE  (CPT=71010): CPT | Performed by: EMERGENCY MEDICINE

## 2017-12-09 PROCEDURE — 96365 THER/PROPH/DIAG IV INF INIT: CPT

## 2017-12-09 RX ORDER — SODIUM CHLORIDE 0.9 % (FLUSH) 0.9 %
3 SYRINGE (ML) INJECTION AS NEEDED
Status: DISCONTINUED | OUTPATIENT
Start: 2017-12-09 | End: 2017-12-12

## 2017-12-09 RX ORDER — SODIUM PHOSPHATE, DIBASIC AND SODIUM PHOSPHATE, MONOBASIC 7; 19 G/133ML; G/133ML
1 ENEMA RECTAL ONCE AS NEEDED
Status: DISCONTINUED | OUTPATIENT
Start: 2017-12-09 | End: 2017-12-12

## 2017-12-09 RX ORDER — NITROGLYCERIN 0.4 MG/1
0.4 TABLET SUBLINGUAL EVERY 5 MIN PRN
Status: DISCONTINUED | OUTPATIENT
Start: 2017-12-09 | End: 2017-12-12

## 2017-12-09 RX ORDER — POTASSIUM CHLORIDE 20 MEQ/1
40 TABLET, EXTENDED RELEASE ORAL EVERY 4 HOURS
Status: COMPLETED | OUTPATIENT
Start: 2017-12-09 | End: 2017-12-10

## 2017-12-09 RX ORDER — LORAZEPAM 2 MG/1
2 TABLET ORAL
Status: DISCONTINUED | OUTPATIENT
Start: 2017-12-09 | End: 2017-12-12

## 2017-12-09 RX ORDER — LORAZEPAM 2 MG/ML
1 CONCENTRATE ORAL EVERY 8 HOURS PRN
Status: DISCONTINUED | OUTPATIENT
Start: 2017-12-09 | End: 2017-12-09

## 2017-12-09 RX ORDER — LORAZEPAM 2 MG/ML
1 INJECTION INTRAMUSCULAR ONCE
Status: COMPLETED | OUTPATIENT
Start: 2017-12-09 | End: 2017-12-09

## 2017-12-09 RX ORDER — LORAZEPAM 1 MG/1
1 TABLET ORAL
Status: DISCONTINUED | OUTPATIENT
Start: 2017-12-09 | End: 2017-12-12

## 2017-12-09 RX ORDER — LORAZEPAM 2 MG/ML
1 INJECTION INTRAMUSCULAR
Status: DISCONTINUED | OUTPATIENT
Start: 2017-12-09 | End: 2017-12-12

## 2017-12-09 RX ORDER — METOPROLOL TARTRATE 50 MG/1
50 TABLET, FILM COATED ORAL
Status: DISCONTINUED | OUTPATIENT
Start: 2017-12-09 | End: 2017-12-11

## 2017-12-09 RX ORDER — ONDANSETRON 2 MG/ML
4 INJECTION INTRAMUSCULAR; INTRAVENOUS EVERY 6 HOURS PRN
Status: DISCONTINUED | OUTPATIENT
Start: 2017-12-09 | End: 2017-12-10

## 2017-12-09 RX ORDER — POLYETHYLENE GLYCOL 3350 17 G/17G
17 POWDER, FOR SOLUTION ORAL DAILY PRN
Status: DISCONTINUED | OUTPATIENT
Start: 2017-12-09 | End: 2017-12-12

## 2017-12-09 RX ORDER — LORAZEPAM 2 MG/ML
2 INJECTION INTRAMUSCULAR
Status: DISCONTINUED | OUTPATIENT
Start: 2017-12-09 | End: 2017-12-12

## 2017-12-09 RX ORDER — ATORVASTATIN CALCIUM 10 MG/1
10 TABLET, FILM COATED ORAL NIGHTLY
Status: DISCONTINUED | OUTPATIENT
Start: 2017-12-09 | End: 2017-12-12

## 2017-12-09 RX ORDER — DILTIAZEM HYDROCHLORIDE 5 MG/ML
5 INJECTION INTRAVENOUS ONCE
Status: COMPLETED | OUTPATIENT
Start: 2017-12-09 | End: 2017-12-09

## 2017-12-09 RX ORDER — BISACODYL 10 MG
10 SUPPOSITORY, RECTAL RECTAL
Status: DISCONTINUED | OUTPATIENT
Start: 2017-12-09 | End: 2017-12-12

## 2017-12-09 RX ORDER — DILTIAZEM HYDROCHLORIDE 5 MG/ML
10 INJECTION INTRAVENOUS
Status: DISPENSED | OUTPATIENT
Start: 2017-12-09 | End: 2017-12-09

## 2017-12-09 RX ORDER — DOCUSATE SODIUM 100 MG/1
100 CAPSULE, LIQUID FILLED ORAL 2 TIMES DAILY
Status: DISCONTINUED | OUTPATIENT
Start: 2017-12-09 | End: 2017-12-12

## 2017-12-09 NOTE — CONSULTS
Reason for Consultation: Atrial fibrillation    Assessment/Plan:     1. Atrial fibrillation with rapid ventricular response (HCC)  - in the past associated with alcohol intoxication  - non-compliant with meds as OP  2. Alcohol abuse  3. HTN  4. Obesity  5. Intravenous PRN   nitroGLYCERIN (NITROSTAT) SL tab 0.4 mg 0.4 mg Sublingual Q5 Min PRN   docusate sodium (COLACE) cap 100 mg 100 mg Oral BID   PEG 3350 (MIRALAX) powder packet 17 g 17 g Oral Daily PRN   magnesium hydroxide (MILK OF MAGNESIA) 400 MG/5ML garo oriented. Normal affect. CV:   PALP:PMI not displaced; no lifts, thrills or rubs. AUSC:irregular rhythm; normal S1, S2 without S3; 1/6 systolic murmur. CAROTIDS:carotid pulses normal.   ABD AORTA:not enlarged. FEM:femoral pulses intact.    PEDAL:pe

## 2017-12-09 NOTE — H&P
[facility]    History and Physical    Olayinka Terrazas Patient Status:  Inpatient    1965 MRN [id number]   Location [facility] 3W/SW Attending Carrillo Chau MD   Hosp Day # 0 PCP Maylin Montoya MD     Date:  2017 sore throat and trouble swallowing. Eyes: Negative for visual disturbance. Respiratory: Positive for shortness of breath. Negative for chest tightness and wheezing. Cardiovascular: Positive for palpitations.  Negative for chest pain and leg swelling (H) 12/09/2017     Xr Chest Ap Portable  (cpt=71010)    Result Date: 12/9/2017  CONCLUSION:  Moderate cardiomegaly and prominent pulmonary vessels, but no buster pulmonary edema. No acute airspace disease.        Ekg 12-lead    Result Date: 12/9/2017  ECG Re

## 2017-12-09 NOTE — ED PROVIDER NOTES
Patient Seen in: [facility] AND [facility] Emergency Department    History   Patient presents with:  Arrythmia/Palpitations (cardiovascular)    Stated Complaint: Palpatations    HPI    39-year-old male with history of hypertension, hyperlipidemia, and atrial fib rhythm  Gastrointestinal:  abdomen is soft and non tender, no masses, bowel sounds normal  Neurological: Speech normal.  Moving extremities equally ×4. Skin: warm and dry, no rashes.   Musculoskeletal: neck is supple non tender        Extremities are symme 0915  ------------------------------------------------------------       MDM     The patient initially had slight improvement with her heart rate dropping from the 170s to the 140s after receiving initial IV Cardizem bolus and drip.   His heart rate then we

## 2017-12-10 PROCEDURE — 80053 COMPREHEN METABOLIC PANEL: CPT | Performed by: INTERNAL MEDICINE

## 2017-12-10 PROCEDURE — 85025 COMPLETE CBC W/AUTO DIFF WBC: CPT | Performed by: INTERNAL MEDICINE

## 2017-12-10 PROCEDURE — 80307 DRUG TEST PRSMV CHEM ANLYZR: CPT | Performed by: INTERNAL MEDICINE

## 2017-12-10 PROCEDURE — 80061 LIPID PANEL: CPT | Performed by: INTERNAL MEDICINE

## 2017-12-10 PROCEDURE — 83735 ASSAY OF MAGNESIUM: CPT | Performed by: INTERNAL MEDICINE

## 2017-12-10 PROCEDURE — 84443 ASSAY THYROID STIM HORMONE: CPT | Performed by: INTERNAL MEDICINE

## 2017-12-10 RX ORDER — 0.9 % SODIUM CHLORIDE 0.9 %
VIAL (ML) INJECTION
Status: COMPLETED
Start: 2017-12-10 | End: 2017-12-10

## 2017-12-10 RX ORDER — LORAZEPAM 1 MG/1
1 TABLET ORAL EVERY 4 HOURS PRN
Status: DISCONTINUED | OUTPATIENT
Start: 2017-12-10 | End: 2017-12-12

## 2017-12-10 RX ORDER — DILTIAZEM HYDROCHLORIDE 180 MG/1
180 CAPSULE, COATED, EXTENDED RELEASE ORAL DAILY
Status: DISCONTINUED | OUTPATIENT
Start: 2017-12-10 | End: 2017-12-12

## 2017-12-10 RX ORDER — MAGNESIUM OXIDE 400 MG (241.3 MG MAGNESIUM) TABLET
800 TABLET ONCE
Status: COMPLETED | OUTPATIENT
Start: 2017-12-10 | End: 2017-12-10

## 2017-12-10 RX ORDER — ACETAMINOPHEN 325 MG/1
650 TABLET ORAL EVERY 6 HOURS PRN
Status: DISCONTINUED | OUTPATIENT
Start: 2017-12-10 | End: 2017-12-12

## 2017-12-10 RX ORDER — ONDANSETRON 2 MG/ML
4 INJECTION INTRAMUSCULAR; INTRAVENOUS EVERY 4 HOURS PRN
Status: DISCONTINUED | OUTPATIENT
Start: 2017-12-10 | End: 2017-12-12

## 2017-12-10 NOTE — PROGRESS NOTES
Assessment and Plan:     1. Atrial fibrillation with rapid ventricular response (HCC)  - in the past also associated with alcohol intoxication  - non-compliant with meds as OP  2. Alcohol abuse  3. HTN  4. Obesity  5.  Probable AYANNA      PLAN:  - Eliquis, -Prominent R(V1) -true posterior extension of inferior MI -Prominent R(V1) and right axis -consider right ventricular hypertrophy -Possible posterior fascicular block.  ABNORMAL When compared with ECG of 12/09/2017 07:27:00 HR has decreased and PVC seen Paige

## 2017-12-10 NOTE — SPIRITUAL CARE NOTE
introduced spiritual care services and asked Pt if he wanted a visit this afternoon or later in the day as he had a visitor. He thanked  and said he was okay and did not need a visit. Pt. was informed he could call us for a visit any time.

## 2017-12-10 NOTE — CM/SW NOTE
12/10CM-MD orders received in regards to ETOH/Substance Abuse. This Writer met with the Patient at bedside in regards to substance abuse resources. The Patient is aware of programs, but agreed to more information.  this Writer inquired if he would like mor

## 2017-12-11 PROCEDURE — 85025 COMPLETE CBC W/AUTO DIFF WBC: CPT | Performed by: INTERNAL MEDICINE

## 2017-12-11 PROCEDURE — 83735 ASSAY OF MAGNESIUM: CPT | Performed by: INTERNAL MEDICINE

## 2017-12-11 PROCEDURE — 84132 ASSAY OF SERUM POTASSIUM: CPT | Performed by: INTERNAL MEDICINE

## 2017-12-11 PROCEDURE — 80048 BASIC METABOLIC PNL TOTAL CA: CPT | Performed by: INTERNAL MEDICINE

## 2017-12-11 RX ORDER — MAGNESIUM OXIDE 400 MG (241.3 MG MAGNESIUM) TABLET
400 TABLET ONCE
Status: COMPLETED | OUTPATIENT
Start: 2017-12-11 | End: 2017-12-11

## 2017-12-11 RX ORDER — DILTIAZEM HYDROCHLORIDE 5 MG/ML
10 INJECTION INTRAVENOUS
Status: DISCONTINUED | OUTPATIENT
Start: 2017-12-11 | End: 2017-12-12

## 2017-12-11 RX ORDER — 0.9 % SODIUM CHLORIDE 0.9 %
VIAL (ML) INJECTION
Status: COMPLETED
Start: 2017-12-11 | End: 2017-12-11

## 2017-12-11 RX ORDER — POTASSIUM CHLORIDE 20 MEQ/1
40 TABLET, EXTENDED RELEASE ORAL EVERY 4 HOURS
Status: COMPLETED | OUTPATIENT
Start: 2017-12-11 | End: 2017-12-11

## 2017-12-11 RX ORDER — DILTIAZEM HYDROCHLORIDE 60 MG/1
60 TABLET, FILM COATED ORAL AS NEEDED
Status: DISCONTINUED | OUTPATIENT
Start: 2017-12-11 | End: 2017-12-12

## 2017-12-11 RX ORDER — DILTIAZEM HYDROCHLORIDE 5 MG/ML
INJECTION INTRAVENOUS
Status: DISPENSED
Start: 2017-12-11 | End: 2017-12-12

## 2017-12-11 NOTE — PROGRESS NOTES
[place] [facility] - [facility]    Progress Note    Charmian Knife Patient Status:  Inpatient    1965 MRN [id number]   Location [facility] 3W/SW Attending Bob Ackerman MD   [facility] Day # 2 PCP Sonia Schuler MD       Subjective:   Pt re Interpretation  -------------------------- Atrial fibrillation - frequent ectopic ventricular beat s -Old inferior infarct -Prominent R(V1) -true posterior extension of inferior MI -Prominent R(V1) and right axis -consider right ventricular hypertrophy -P

## 2017-12-11 NOTE — PROGRESS NOTES
Assessment and Plan:     1. Atrial fibrillation with rapid ventricular response ([address] rates better  - in the past also associated with alcohol intoxication  - non-compliant with meds as OP  2. Alcohol abuse  3. HTN  4. Obesity  5.  Probable AYANNA      PLAN

## 2017-12-11 NOTE — PLAN OF CARE
Problem: Patient/Family Goals  Goal: Patient/Family Long Term Goal  Patient's Long Term Goal: to return home.     Interventions:  - See additional Care Plan goals for specific interventions    Outcome: Progressing    Goal: Patient/Family Short Term Goal  Pa and perform as needed  - Assess and instruct to report SOB or any respiratory difficulty  - Respiratory Therapy support as indicated  - Manage/alleviate anxiety  - Monitor for signs/symptoms of CO2 retention   Outcome: Progressing      Comments: VSS, pt on

## 2017-12-12 VITALS
WEIGHT: 315 LBS | DIASTOLIC BLOOD PRESSURE: 71 MMHG | RESPIRATION RATE: 20 BRPM | BODY MASS INDEX: 47.74 KG/M2 | SYSTOLIC BLOOD PRESSURE: 137 MMHG | TEMPERATURE: 97 F | HEART RATE: 89 BPM | HEIGHT: 68 IN | OXYGEN SATURATION: 98 %

## 2017-12-12 PROCEDURE — 83735 ASSAY OF MAGNESIUM: CPT | Performed by: INTERNAL MEDICINE

## 2017-12-12 RX ORDER — METOPROLOL TARTRATE 75 MG/1
75 TABLET, FILM COATED ORAL
Qty: 60 TABLET | Refills: 1 | Status: SHIPPED | OUTPATIENT
Start: 2017-12-12 | End: 2018-01-05 | Stop reason: ALTCHOICE

## 2017-12-12 RX ORDER — MAGNESIUM OXIDE 400 MG (241.3 MG MAGNESIUM) TABLET
400 TABLET ONCE
Status: COMPLETED | OUTPATIENT
Start: 2017-12-12 | End: 2017-12-12

## 2017-12-12 RX ORDER — DILTIAZEM HYDROCHLORIDE 180 MG/1
180 CAPSULE, COATED, EXTENDED RELEASE ORAL DAILY
Qty: 30 CAPSULE | Refills: 1 | Status: SHIPPED | OUTPATIENT
Start: 2017-12-12 | End: 2018-01-05 | Stop reason: ALTCHOICE

## 2017-12-12 RX ORDER — ATORVASTATIN CALCIUM 10 MG/1
10 TABLET, FILM COATED ORAL NIGHTLY
Qty: 30 TABLET | Refills: 1 | Status: SHIPPED | OUTPATIENT
Start: 2017-12-12 | End: 2018-01-05 | Stop reason: ALTCHOICE

## 2017-12-12 NOTE — CM/SW NOTE
Patient being discharged on Eliquis - explanation and coupon booklet given, stressed importance of med compliance. No further questions from patient.      Kashmir Taylor, [address] [id number]

## 2017-12-13 ENCOUNTER — TELEPHONE (OUTPATIENT)
Dept: CARDIOLOGY UNIT | Facility: HOSPITAL | Age: 52
End: 2017-12-13

## 2017-12-15 NOTE — DISCHARGE SUMMARY
[place] [facility] - [facility]    Discharge Summary    Maral Alvarez Patient Status:  Inpatient    1965 MRN [id number]   Location [facility] 3W/SW Attending No att. providers found   [facility] Day # 3 PCP Bala Escalona MD     Date of [facility] Internal Medicine    Karen Ortez MD Consulting Physician  Internal Medicine              Discharge Plan:   Discharge Condition: Good    Discharge Medication List as of 12/12/2017  4:54 PM    New Orders    apixaban 5 MG Oral Tab  Take 1 tablet (5 mg

## 2018-01-15 ENCOUNTER — PRIOR ORIGINAL RECORDS (OUTPATIENT)
Dept: OTHER | Age: 53
End: 2018-01-15

## 2018-07-31 LAB
ANALYZER ANC (IANC): NORMAL
ANION GAP SERPL CALC-SCNC: 14 MMOL/L (ref 10–20)
APTT PPP: 29 SECONDS (ref 22–30)
APTT PPP: NORMAL S
BASOPHILS # BLD: 0 THOUSAND/MCL (ref 0–0.3)
BASOPHILS NFR BLD: 0 %
BNP SERPL-MCNC: 237 PG/ML
BUN SERPL-MCNC: 15 MG/DL (ref 6–20)
BUN/CREAT SERPL: 19 (ref 7–25)
CALCIUM SERPL-MCNC: 8.4 MG/DL (ref 8.4–10.2)
CHLORIDE: 104 MMOL/L (ref 98–107)
CO2 SERPL-SCNC: 26 MMOL/L (ref 21–32)
CREAT SERPL-MCNC: 0.81 MG/DL (ref 0.67–1.17)
D DIMER PPP FEU-MCNC: 0.51 MG/L FEU
DIFFERENTIAL METHOD BLD: NORMAL
EOSINOPHIL # BLD: 0.2 THOUSAND/MCL (ref 0.1–0.5)
EOSINOPHIL NFR BLD: 2 %
ERYTHROCYTE [DISTWIDTH] IN BLOOD: 15 % (ref 11–15)
GLUCOSE SERPL-MCNC: 97 MG/DL (ref 65–99)
HEMATOCRIT: 46.1 % (ref 39–51)
HGB BLD-MCNC: 15.7 GM/DL (ref 13–17)
INR PPP: 1
LYMPHOCYTES # BLD: 2.8 THOUSAND/MCL (ref 1–4)
LYMPHOCYTES NFR BLD: 34 %
MAGNESIUM SERPL-MCNC: 1.7 MG/DL (ref 1.7–2.4)
MCH RBC QN AUTO: 30.8 PG (ref 26–34)
MCHC RBC AUTO-ENTMCNC: 34.1 GM/DL (ref 32–36.5)
MCV RBC AUTO: 90.4 FL (ref 78–100)
MONOCYTES # BLD: 0.8 THOUSAND/MCL (ref 0.3–0.9)
MONOCYTES NFR BLD: 10 %
NEUTROPHILS # BLD: 4.6 THOUSAND/MCL (ref 1.8–7.7)
NEUTROPHILS NFR BLD: 54 %
NEUTS SEG NFR BLD: NORMAL %
NRBC (NRBCRE): NORMAL
PLATELET # BLD: 200 THOUSAND/MCL (ref 140–450)
POTASSIUM SERPL-SCNC: 4 MMOL/L (ref 3.4–5.1)
PROTHROMBIN TIME: 10.5 SECONDS (ref 9.7–11.8)
PROTHROMBIN TIME: NORMAL
RBC # BLD: 5.1 MILLION/MCL (ref 4.5–5.9)
SODIUM SERPL-SCNC: 140 MMOL/L (ref 135–145)
TROPONIN I SERPL HS-MCNC: <0.02 NG/ML
WBC # BLD: 8.5 THOUSAND/MCL (ref 4.2–11)

## 2018-08-01 ENCOUNTER — DIAGNOSTIC TRANS (OUTPATIENT)
Dept: OTHER | Age: 53
End: 2018-08-01

## 2018-08-01 ENCOUNTER — HOSPITAL (OUTPATIENT)
Dept: OTHER | Age: 53
End: 2018-08-01
Attending: INTERNAL MEDICINE

## 2018-08-01 LAB
ALBUMIN SERPL-MCNC: 3.2 GM/DL (ref 3.6–5.1)
ALBUMIN/GLOB SERPL: 0.9 {RATIO} (ref 1–2.4)
ALP SERPL-CCNC: 80 UNIT/L (ref 45–117)
ALT SERPL-CCNC: 24 UNIT/L
ANALYZER ANC (IANC): ABNORMAL
ANION GAP SERPL CALC-SCNC: 13 MMOL/L (ref 10–20)
APTT PPP: 34 SECONDS (ref 22–30)
APTT PPP: 38 SECONDS (ref 22–30)
APTT PPP: ABNORMAL S
APTT PPP: ABNORMAL S
AST SERPL-CCNC: 18 UNIT/L
BILIRUB SERPL-MCNC: 0.7 MG/DL (ref 0.2–1)
BUN SERPL-MCNC: 16 MG/DL (ref 6–20)
BUN/CREAT SERPL: 20 (ref 7–25)
CALCIUM SERPL-MCNC: 8.2 MG/DL (ref 8.4–10.2)
CHLORIDE: 106 MMOL/L (ref 98–107)
CO2 SERPL-SCNC: 26 MMOL/L (ref 21–32)
CREAT SERPL-MCNC: 0.79 MG/DL (ref 0.67–1.17)
ERYTHROCYTE [DISTWIDTH] IN BLOOD: 15.5 % (ref 11–15)
GLOBULIN SER-MCNC: 3.6 GM/DL (ref 2–4)
GLUCOSE SERPL-MCNC: 115 MG/DL (ref 65–99)
HEMATOCRIT: 44.5 % (ref 39–51)
HGB BLD-MCNC: 15.1 GM/DL (ref 13–17)
MCH RBC QN AUTO: 30.7 PG (ref 26–34)
MCHC RBC AUTO-ENTMCNC: 33.9 GM/DL (ref 32–36.5)
MCV RBC AUTO: 90.4 FL (ref 78–100)
NRBC (NRBCRE): ABNORMAL
PLATELET # BLD: 203 THOUSAND/MCL (ref 140–450)
POTASSIUM SERPL-SCNC: 3.8 MMOL/L (ref 3.4–5.1)
PROT SERPL-MCNC: 6.8 GM/DL (ref 6.4–8.2)
RBC # BLD: 4.92 MILLION/MCL (ref 4.5–5.9)
SODIUM SERPL-SCNC: 141 MMOL/L (ref 135–145)
TROPONIN I SERPL HS-MCNC: <0.02 NG/ML
WBC # BLD: 7.6 THOUSAND/MCL (ref 4.2–11)

## 2018-08-02 ENCOUNTER — CHARTING TRANS (OUTPATIENT)
Dept: OTHER | Age: 53
End: 2018-08-02

## 2018-08-02 LAB
ANALYZER ANC (IANC): ABNORMAL
APTT PPP: 55 SECONDS (ref 22–30)
APTT PPP: 58 SECONDS (ref 22–30)
APTT PPP: ABNORMAL S
APTT PPP: ABNORMAL S
ERYTHROCYTE [DISTWIDTH] IN BLOOD: 15.3 % (ref 11–15)
HEMATOCRIT: 43.6 % (ref 39–51)
HGB BLD-MCNC: 14.9 GM/DL (ref 13–17)
MCH RBC QN AUTO: 30.7 PG (ref 26–34)
MCHC RBC AUTO-ENTMCNC: 34.2 GM/DL (ref 32–36.5)
MCV RBC AUTO: 89.7 FL (ref 78–100)
NRBC (NRBCRE): ABNORMAL
PLATELET # BLD: 176 THOUSAND/MCL (ref 140–450)
RBC # BLD: 4.86 MILLION/MCL (ref 4.5–5.9)
WBC # BLD: 7.9 THOUSAND/MCL (ref 4.2–11)

## 2018-08-03 LAB
APTT PPP: 29 SECONDS (ref 22–30)
APTT PPP: NORMAL S

## 2018-08-06 ENCOUNTER — PRIOR ORIGINAL RECORDS (OUTPATIENT)
Dept: OTHER | Age: 53
End: 2018-08-06

## 2018-08-07 ENCOUNTER — PRIOR ORIGINAL RECORDS (OUTPATIENT)
Dept: OTHER | Age: 53
End: 2018-08-07

## 2018-08-20 ENCOUNTER — HOSPITAL (OUTPATIENT)
Dept: OTHER | Age: 53
End: 2018-08-20
Attending: FAMILY MEDICINE

## 2018-08-20 LAB
ALBUMIN SERPL-MCNC: 3.1 GM/DL (ref 3.6–5.1)
ALP SERPL-CCNC: 88 UNIT/L (ref 45–117)
ALT SERPL-CCNC: 33 UNIT/L
ANALYZER ANC (IANC): NORMAL
ANION GAP SERPL CALC-SCNC: 15 MMOL/L (ref 10–20)
AST SERPL-CCNC: 35 UNIT/L
BASOPHILS # BLD: 0 THOUSAND/MCL (ref 0–0.3)
BASOPHILS NFR BLD: 0 %
BILIRUB CONJ SERPL-MCNC: 0.1 MG/DL (ref 0–0.2)
BILIRUB SERPL-MCNC: 0.5 MG/DL (ref 0.2–1)
BUN SERPL-MCNC: 18 MG/DL (ref 6–20)
BUN/CREAT SERPL: 20 (ref 7–25)
CALCIUM SERPL-MCNC: 7.8 MG/DL (ref 8.4–10.2)
CHLORIDE: 109 MMOL/L (ref 98–107)
CO2 SERPL-SCNC: 22 MMOL/L (ref 21–32)
CREAT SERPL-MCNC: 0.9 MG/DL (ref 0.67–1.17)
D DIMER PPP FEU-MCNC: 0.49 MG/L FEU
DIFFERENTIAL METHOD BLD: NORMAL
EOSINOPHIL # BLD: 0.1 THOUSAND/MCL (ref 0.1–0.5)
EOSINOPHIL NFR BLD: 1 %
ERYTHROCYTE [DISTWIDTH] IN BLOOD: 14.6 % (ref 11–15)
ETHANOL SERPL-MCNC: 199 MG/DL
GLUCOSE BLDC GLUCOMTR-MCNC: 106 MG/DL (ref 65–99)
GLUCOSE BLDC GLUCOMTR-MCNC: 131 MG/DL (ref 65–99)
GLUCOSE BLDC GLUCOMTR-MCNC: 132 MG/DL (ref 65–99)
GLUCOSE SERPL-MCNC: 111 MG/DL (ref 65–99)
HEMATOCRIT: 46.5 % (ref 39–51)
HGB BLD-MCNC: 16.3 GM/DL (ref 13–17)
INR PPP: 1.2
LYMPHOCYTES # BLD: 2.8 THOUSAND/MCL (ref 1–4)
LYMPHOCYTES NFR BLD: 39 %
MAGNESIUM SERPL-MCNC: 1.5 MG/DL (ref 1.7–2.4)
MCH RBC QN AUTO: 30.8 PG (ref 26–34)
MCHC RBC AUTO-ENTMCNC: 35.1 GM/DL (ref 32–36.5)
MCV RBC AUTO: 87.7 FL (ref 78–100)
MONOCYTES # BLD: 0.6 THOUSAND/MCL (ref 0.3–0.9)
MONOCYTES NFR BLD: 9 %
NEUTROPHILS # BLD: 3.7 THOUSAND/MCL (ref 1.8–7.7)
NEUTROPHILS NFR BLD: 51 %
NEUTS SEG NFR BLD: NORMAL %
NRBC (NRBCRE): NORMAL
NT-PROBNP SERPL-MCNC: 370 PG/ML
PLATELET # BLD: 233 THOUSAND/MCL (ref 140–450)
POTASSIUM SERPL-SCNC: 4.1 MMOL/L (ref 3.4–5.1)
PROT SERPL-MCNC: 7.2 GM/DL (ref 6.4–8.2)
PROTHROMBIN TIME: 11.7 SECONDS (ref 9.7–11.8)
PROTHROMBIN TIME: NORMAL
RBC # BLD: 5.3 MILLION/MCL (ref 4.5–5.9)
SODIUM SERPL-SCNC: 142 MMOL/L (ref 135–145)
TROPONIN I SERPL HS-MCNC: 0.02 NG/ML
TROPONIN I SERPL HS-MCNC: 0.03 NG/ML
TROPONIN I SERPL HS-MCNC: <0.02 NG/ML
WBC # BLD: 7.1 THOUSAND/MCL (ref 4.2–11)

## 2018-08-21 ENCOUNTER — DIAGNOSTIC TRANS (OUTPATIENT)
Dept: OTHER | Age: 53
End: 2018-08-21

## 2018-08-21 ENCOUNTER — PRIOR ORIGINAL RECORDS (OUTPATIENT)
Dept: OTHER | Age: 53
End: 2018-08-21

## 2018-08-21 LAB
ALBUMIN SERPL-MCNC: 3 GM/DL (ref 3.6–5.1)
ALBUMIN/GLOB SERPL: 0.7 {RATIO} (ref 1–2.4)
ALP SERPL-CCNC: 86 UNIT/L (ref 45–117)
ALT SERPL-CCNC: 35 UNIT/L
ANALYZER ANC (IANC): NORMAL
ANION GAP SERPL CALC-SCNC: 13 MMOL/L (ref 10–20)
AST SERPL-CCNC: 34 UNIT/L
BASOPHILS # BLD: 0 THOUSAND/MCL (ref 0–0.3)
BASOPHILS NFR BLD: 0 %
BILIRUB SERPL-MCNC: 1.3 MG/DL (ref 0.2–1)
BUN SERPL-MCNC: 18 MG/DL (ref 6–20)
BUN/CREAT SERPL: 21 (ref 7–25)
CALCIUM SERPL-MCNC: 8.4 MG/DL (ref 8.4–10.2)
CHLORIDE: 105 MMOL/L (ref 98–107)
CHOLEST SERPL-MCNC: 112 MG/DL
CHOLEST/HDLC SERPL: 2.4 {RATIO}
CO2 SERPL-SCNC: 25 MMOL/L (ref 21–32)
CREAT SERPL-MCNC: 0.86 MG/DL (ref 0.67–1.17)
DIFFERENTIAL METHOD BLD: NORMAL
EOSINOPHIL # BLD: 0.1 THOUSAND/MCL (ref 0.1–0.5)
EOSINOPHIL NFR BLD: 1 %
ERYTHROCYTE [DISTWIDTH] IN BLOOD: 14.6 % (ref 11–15)
GLOBULIN SER-MCNC: 4.3 GM/DL (ref 2–4)
GLUCOSE BLDC GLUCOMTR-MCNC: 107 MG/DL (ref 65–99)
GLUCOSE BLDC GLUCOMTR-MCNC: 111 MG/DL (ref 65–99)
GLUCOSE SERPL-MCNC: 105 MG/DL (ref 65–99)
HDLC SERPL-MCNC: 47 MG/DL
HEMATOCRIT: 49.9 % (ref 39–51)
HGB BLD-MCNC: 16.7 GM/DL (ref 13–17)
LDLC SERPL CALC-MCNC: 23 MG/DL
LYMPHOCYTES # BLD: 2.3 THOUSAND/MCL (ref 1–4)
LYMPHOCYTES NFR BLD: 29 %
MAGNESIUM SERPL-MCNC: 1.5 MG/DL (ref 1.7–2.4)
MCH RBC QN AUTO: 29.9 PG (ref 26–34)
MCHC RBC AUTO-ENTMCNC: 33.5 GM/DL (ref 32–36.5)
MCV RBC AUTO: 89.4 FL (ref 78–100)
MONOCYTES # BLD: 0.8 THOUSAND/MCL (ref 0.3–0.9)
MONOCYTES NFR BLD: 10 %
NEUTROPHILS # BLD: 4.9 THOUSAND/MCL (ref 1.8–7.7)
NEUTROPHILS NFR BLD: 60 %
NEUTS SEG NFR BLD: NORMAL %
NONHDLC SERPL-MCNC: 65 MG/DL
NRBC (NRBCRE): NORMAL
PLATELET # BLD: 196 THOUSAND/MCL (ref 140–450)
POTASSIUM SERPL-SCNC: 3.6 MMOL/L (ref 3.4–5.1)
PROT SERPL-MCNC: 7.3 GM/DL (ref 6.4–8.2)
RBC # BLD: 5.58 MILLION/MCL (ref 4.5–5.9)
SODIUM SERPL-SCNC: 139 MMOL/L (ref 135–145)
TRIGLYCERIDE (TRIGP): 208 MG/DL
WBC # BLD: 8.2 THOUSAND/MCL (ref 4.2–11)

## 2018-08-29 ENCOUNTER — PRIOR ORIGINAL RECORDS (OUTPATIENT)
Dept: OTHER | Age: 53
End: 2018-08-29

## 2018-09-18 LAB
ALBUMIN: 3 G/DL
ALKALINE PHOSPHATATE(ALK PHOS): 86 IU/L
ALT (SGPT): 35 U/L
AST (SGOT): 34 U/L
BILIRUBIN TOTAL: 1.3 MG/DL
BUN: 18 MG/DL
CALCIUM: 8.4 MG/DL
CHLORIDE: 105 MEQ/L
CHOLESTEROL, TOTAL: 112 MG/DL
CREATININE, SERUM: 0.86 MG/DL
GLOBULIN: 4.3 G/DL
GLUCOSE: 105 MG/DL
GLUCOSE: 105 MG/DL
HDL CHOLESTEROL: 47 MG/DL
HEMATOCRIT: 49.9 %
HEMOGLOBIN: 16.7 G/DL
LDL CHOLESTEROL: 23 MG/DL
MAGNESIUM: 1.5 MG/DL
MCH: 29.9 PG
MCHC: 33.5 G/DL
MCV: 89.4 FL
NON-HDL CHOLESTEROL: 65 MG/DL
PLATELETS: 196 K/UL
POTASSIUM, SERUM: 3.6 MEQ/L
PROTEIN, TOTAL: 7.3 G/DL
RED BLOOD COUNT: 5.58 X 10-6/U
SGOT (AST): 34 IU/L
SGPT (ALT): 35 IU/L
SODIUM: 139 MEQ/L
TRIGLYCERIDES: 208 MG/DL
WHITE BLOOD COUNT: 8.2 X 10-3/U

## 2018-11-26 ENCOUNTER — HOSPITAL ENCOUNTER (EMERGENCY)
Facility: HOSPITAL | Age: 53
Discharge: HOME | End: 2018-11-26
Attending: EMERGENCY MEDICINE
Payer: MEDICAID

## 2018-11-26 VITALS
SYSTOLIC BLOOD PRESSURE: 131 MMHG | HEART RATE: 73 BPM | BODY MASS INDEX: 46.65 KG/M2 | HEIGHT: 69 IN | DIASTOLIC BLOOD PRESSURE: 69 MMHG | TEMPERATURE: 98 F | RESPIRATION RATE: 18 BRPM | OXYGEN SATURATION: 97 % | WEIGHT: 315 LBS

## 2018-11-26 DIAGNOSIS — H81.10 BENIGN PAROXYSMAL POSITIONAL VERTIGO, UNSPECIFIED LATERALITY: Primary | ICD-10-CM

## 2018-11-26 PROCEDURE — 99285 EMERGENCY DEPT VISIT HI MDM: CPT

## 2018-11-26 PROCEDURE — 93005 ELECTROCARDIOGRAM TRACING: CPT

## 2018-11-26 PROCEDURE — 85025 COMPLETE CBC W/AUTO DIFF WBC: CPT | Performed by: EMERGENCY MEDICINE

## 2018-11-26 PROCEDURE — 96374 THER/PROPH/DIAG INJ IV PUSH: CPT

## 2018-11-26 PROCEDURE — 80048 BASIC METABOLIC PNL TOTAL CA: CPT | Performed by: EMERGENCY MEDICINE

## 2018-11-26 PROCEDURE — 96375 TX/PRO/DX INJ NEW DRUG ADDON: CPT

## 2018-11-26 PROCEDURE — 96361 HYDRATE IV INFUSION ADD-ON: CPT

## 2018-11-26 PROCEDURE — 93010 ELECTROCARDIOGRAM REPORT: CPT | Performed by: EMERGENCY MEDICINE

## 2018-11-26 RX ORDER — DIPHENHYDRAMINE HYDROCHLORIDE 50 MG/ML
25 INJECTION INTRAMUSCULAR; INTRAVENOUS ONCE
Status: COMPLETED | OUTPATIENT
Start: 2018-11-26 | End: 2018-11-26

## 2018-11-26 RX ORDER — METOCLOPRAMIDE HYDROCHLORIDE 5 MG/ML
10 INJECTION INTRAMUSCULAR; INTRAVENOUS ONCE
Status: COMPLETED | OUTPATIENT
Start: 2018-11-26 | End: 2018-11-26

## 2018-11-26 RX ORDER — MECLIZINE HYDROCHLORIDE 25 MG/1
25 TABLET ORAL 3 TIMES DAILY PRN
Qty: 20 TABLET | Refills: 0 | Status: SHIPPED | OUTPATIENT
Start: 2018-11-26 | End: 2019-01-23

## 2018-11-26 RX ORDER — ONDANSETRON 4 MG/1
4 TABLET, ORALLY DISINTEGRATING ORAL EVERY 4 HOURS PRN
Qty: 10 TABLET | Refills: 0 | Status: SHIPPED | OUTPATIENT
Start: 2018-11-26 | End: 2018-12-03

## 2018-11-27 NOTE — ED INITIAL ASSESSMENT (HPI)
C/o dizziness, states he feels like the room is spinning, denies chest pain, states he is nauseous as well

## 2018-11-27 NOTE — ED PROVIDER NOTES
Patient Seen in: [facility] AND [facility] Emergency Department    History   Patient presents with:  Dizziness (neurologic)    Stated Complaint: dizziness    HPI    18-year-old male with past medical history significant for atrial fibrillation, high blood pressu exudates  Eyes: Conjunctivae and EOM are normal. PERRLA  Neck: Normal range of motion. Neck supple. No tracheal deviation present. CV: s1s2+, RRR, no m/r/g, normal distal pulses  Pulmonary/Chest: CTA b/l with no rales, wheezes.   No chest wall tenderness dizziness or nausea. His cardiac workup otherwise is relatively unremarkable. Patient's electrolytes are within normal limits. Patient does not have any other symptoms of acute stroke. He is comfortable with discharge at this time.             Dispositi

## 2019-01-01 ENCOUNTER — EXTERNAL RECORD (OUTPATIENT)
Dept: HEALTH INFORMATION MANAGEMENT | Facility: OTHER | Age: 54
End: 2019-01-01

## 2019-02-08 ENCOUNTER — APPOINTMENT (OUTPATIENT)
Dept: CT IMAGING | Facility: HOSPITAL | Age: 54
End: 2019-02-08
Attending: PHYSICIAN ASSISTANT
Payer: MEDICAID

## 2019-02-08 ENCOUNTER — HOSPITAL ENCOUNTER (EMERGENCY)
Facility: HOSPITAL | Age: 54
Discharge: HOME | End: 2019-02-08
Attending: PHYSICIAN ASSISTANT
Payer: MEDICAID

## 2019-02-08 VITALS
RESPIRATION RATE: 16 BRPM | HEIGHT: 70 IN | HEART RATE: 80 BPM | SYSTOLIC BLOOD PRESSURE: 110 MMHG | OXYGEN SATURATION: 99 % | BODY MASS INDEX: 45.1 KG/M2 | WEIGHT: 315 LBS | DIASTOLIC BLOOD PRESSURE: 71 MMHG | TEMPERATURE: 97 F

## 2019-02-08 DIAGNOSIS — N20.1 URETEROLITHIASIS: ICD-10-CM

## 2019-02-08 DIAGNOSIS — R31.9 HEMATURIA, UNSPECIFIED TYPE: Primary | ICD-10-CM

## 2019-02-08 LAB
ANION GAP SERPL CALC-SCNC: 10 MMOL/L (ref 0–18)
APTT PPP: 43 SECONDS (ref 23.2–35.3)
BACTERIA UR QL AUTO: NEGATIVE /HPF
BASOPHILS # BLD AUTO: 0.05 X10(3) UL (ref 0–0.2)
BASOPHILS NFR BLD AUTO: 0.6 %
BILIRUB UR QL: NEGATIVE
BUN SERPL-MCNC: 10 MG/DL (ref 8–20)
BUN/CREAT SERPL: 11.1 (ref 10–20)
CALCIUM SERPL-MCNC: 8.8 MG/DL (ref 8.5–10.5)
CHLORIDE SERPL-SCNC: 104 MMOL/L (ref 95–110)
CO2 SERPL-SCNC: 22 MMOL/L (ref 22–32)
CREAT SERPL-MCNC: 0.9 MG/DL (ref 0.5–1.5)
DEPRECATED RDW RBC AUTO: 49.9 FL (ref 35.1–46.3)
EOSINOPHIL # BLD AUTO: 0.24 X10(3) UL (ref 0–0.7)
EOSINOPHIL NFR BLD AUTO: 3 %
ERYTHROCYTE [DISTWIDTH] IN BLOOD BY AUTOMATED COUNT: 15.4 % (ref 11–15)
GLUCOSE SERPL-MCNC: 126 MG/DL (ref 70–99)
GLUCOSE UR-MCNC: NEGATIVE MG/DL
HCT VFR BLD AUTO: 41.8 % (ref 39–53)
HGB BLD-MCNC: 14.5 G/DL (ref 13–17.5)
IMM GRANULOCYTES # BLD AUTO: 0.08 X10(3) UL (ref 0–1)
IMM GRANULOCYTES NFR BLD: 1 %
INR BLD: 2 (ref 0.9–1.2)
KETONES UR-MCNC: NEGATIVE MG/DL
LEUKOCYTE ESTERASE UR QL STRIP.AUTO: NEGATIVE
LYMPHOCYTES # BLD AUTO: 1.7 X10(3) UL (ref 1–4)
LYMPHOCYTES NFR BLD AUTO: 21.1 %
MCH RBC QN AUTO: 31.2 PG (ref 26–34)
MCHC RBC AUTO-ENTMCNC: 34.7 G/DL (ref 31–37)
MCV RBC AUTO: 89.9 FL (ref 80–100)
MONOCYTES # BLD AUTO: 1.06 X10(3) UL (ref 0.1–1)
MONOCYTES NFR BLD AUTO: 13.2 %
NEUTROPHILS # BLD AUTO: 4.93 X10 (3) UL (ref 1.5–7.7)
NEUTROPHILS # BLD AUTO: 4.93 X10(3) UL (ref 1.5–7.7)
NEUTROPHILS NFR BLD AUTO: 61.1 %
NITRITE UR QL STRIP.AUTO: NEGATIVE
OSMOLALITY UR CALC.SUM OF ELEC: 283 MOSM/KG (ref 275–295)
PH UR: 6 [PH] (ref 5–8)
PLATELET # BLD AUTO: 206 10(3)UL (ref 150–450)
POTASSIUM SERPL-SCNC: 3.8 MMOL/L (ref 3.3–5.1)
PROT UR-MCNC: 30 MG/DL
PROTHROMBIN TIME: 22 SECONDS (ref 11.8–14.5)
RBC # BLD AUTO: 4.65 X10(6)UL (ref 4.3–5.7)
RBC #/AREA URNS AUTO: 2178 /HPF
SODIUM SERPL-SCNC: 136 MMOL/L (ref 136–144)
SP GR UR STRIP: 1.02 (ref 1–1.03)
UROBILINOGEN UR STRIP-ACNC: <2
VIT C UR-MCNC: 20 MG/DL
WBC # BLD AUTO: 8.1 X10(3) UL (ref 4–11)
WBC #/AREA URNS AUTO: 4 /HPF

## 2019-02-08 PROCEDURE — 99284 EMERGENCY DEPT VISIT MOD MDM: CPT

## 2019-02-08 PROCEDURE — 85610 PROTHROMBIN TIME: CPT | Performed by: PHYSICIAN ASSISTANT

## 2019-02-08 PROCEDURE — 85730 THROMBOPLASTIN TIME PARTIAL: CPT | Performed by: PHYSICIAN ASSISTANT

## 2019-02-08 PROCEDURE — 80048 BASIC METABOLIC PNL TOTAL CA: CPT | Performed by: PHYSICIAN ASSISTANT

## 2019-02-08 PROCEDURE — 85025 COMPLETE CBC W/AUTO DIFF WBC: CPT | Performed by: PHYSICIAN ASSISTANT

## 2019-02-08 PROCEDURE — 74176 CT ABD & PELVIS W/O CONTRAST: CPT | Performed by: PHYSICIAN ASSISTANT

## 2019-02-08 PROCEDURE — 36415 COLL VENOUS BLD VENIPUNCTURE: CPT

## 2019-02-08 PROCEDURE — 81001 URINALYSIS AUTO W/SCOPE: CPT | Performed by: PHYSICIAN ASSISTANT

## 2019-02-08 RX ORDER — HYDROCODONE BITARTRATE AND ACETAMINOPHEN 5; 325 MG/1; MG/1
1 TABLET ORAL EVERY 6 HOURS PRN
Qty: 12 TABLET | Refills: 0 | Status: SHIPPED | OUTPATIENT
Start: 2019-02-08 | End: 2019-02-15

## 2019-02-08 RX ORDER — ONDANSETRON 4 MG/1
4 TABLET, ORALLY DISINTEGRATING ORAL EVERY 6 HOURS PRN
Qty: 10 TABLET | Refills: 0 | Status: SHIPPED | OUTPATIENT
Start: 2019-02-08 | End: 2019-02-11

## 2019-02-08 NOTE — ED INITIAL ASSESSMENT (HPI)
C/o hematuria x 1 week. Denies clots, denies abdominal pain, n/v or difficulty urinating. Pt states he also developed \"pulling sensation\" to his right testicle around the same time.  Hx kidney stones, pt on xaralto for afib

## 2019-02-09 NOTE — ED PROVIDER NOTES
Patient Seen in: [facility] AND [facility] Emergency Department    History   Patient presents with:  Urinary Symptoms (urologic)    Stated Complaint: hematuria    HPI    Ai Lim is a 46year old male who presents with chief complaint of hematuria.   Onse nightly. Metoprolol Tartrate 100 MG Oral Tab,  Take 1 tablet (100 mg total) by mouth 2 (two) times daily. digoxin 0.25 MG Oral Tab,  Take 1 tablet (0.25 mg total) by mouth daily.    gabapentin 300 MG Oral Cap,  Take 1 capsule (300 mg total) by mouth nig There is no stridor. Air entry is equal.  Cardiovascular: Irregularly irregular rhythm, no murmurs, gallops, rubs. Capillary refill is brisk. No extremity edema. Gastrointestinal: Obese abdomen. Abdomen soft, nontender, nondistended.   There is no rebo DIFFERENTIAL WITH PLATELET.   Procedure                               Abnormality         Status                     ---------                               -----------         ------                     CBC W/ DIFFERENTIAL[[id number]]          Abnormal reassessment of your blood pressure.     Medications Prescribed:  Current Discharge Medication List    START taking these medications    HYDROcodone-acetaminophen 5-325 MG Oral Tab  Take 1 tablet by mouth every 6 (six) hours as needed for Pain (Severe pain)

## 2019-02-18 PROCEDURE — 87086 URINE CULTURE/COLONY COUNT: CPT | Performed by: PHYSICIAN ASSISTANT

## 2019-02-19 ENCOUNTER — PRIOR ORIGINAL RECORDS (OUTPATIENT)
Dept: OTHER | Age: 54
End: 2019-02-19

## 2019-02-27 ENCOUNTER — PRIOR ORIGINAL RECORDS (OUTPATIENT)
Dept: OTHER | Age: 54
End: 2019-02-27

## 2019-02-28 VITALS
HEART RATE: 66 BPM | RESPIRATION RATE: 16 BRPM | HEIGHT: 70 IN | BODY MASS INDEX: 45.1 KG/M2 | SYSTOLIC BLOOD PRESSURE: 110 MMHG | WEIGHT: 315 LBS | DIASTOLIC BLOOD PRESSURE: 74 MMHG

## 2019-03-05 ENCOUNTER — TELEPHONE (OUTPATIENT)
Dept: CARDIOLOGY | Age: 54
End: 2019-03-05

## 2019-03-05 ENCOUNTER — PRIOR ORIGINAL RECORDS (OUTPATIENT)
Dept: HEALTH INFORMATION MANAGEMENT | Facility: OTHER | Age: 54
End: 2019-03-05

## 2019-03-05 LAB
ALBUMIN SERPL-MCNC: 3.1 G/DL
ALBUMIN SERPL-MCNC: 3.1 G/DL
ALBUMIN/GLOB SERPL: NORMAL {RATIO}
ALBUMIN/GLOB SERPL: NORMAL {RATIO}
ALP SERPL-CCNC: 96 U/L
ALP SERPL-CCNC: NORMAL U/L
ALT SERPL-CCNC: 24 U/L
ALT SERPL-CCNC: 24 U/L
ANION GAP SERPL CALC-SCNC: NORMAL MMOL/L
ANION GAP SERPL CALC-SCNC: NORMAL MMOL/L
AST SERPL-CCNC: 22 U/L
AST SERPL-CCNC: 22 U/L
BILIRUB SERPL-MCNC: 0.3 MG/DL
BILIRUB SERPL-MCNC: 0.3 MG/DL
BUN SERPL-MCNC: 19 MG/DL
BUN SERPL-MCNC: 19 MG/DL
BUN/CREAT SERPL: NORMAL
BUN/CREAT SERPL: NORMAL
CALCIUM SERPL-MCNC: 9.3 MG/DL
CALCIUM SERPL-MCNC: 9.3 MG/DL
CHLORIDE SERPL-SCNC: 104 MMOL/L
CHLORIDE SERPL-SCNC: 104 MMOL/L
CO2 SERPL-SCNC: NORMAL MMOL/L
CO2 SERPL-SCNC: NORMAL MMOL/L
CREAT SERPL-MCNC: 0.87 MG/DL
CREAT SERPL-MCNC: 0.87 MG/DL
GLOBULIN SER-MCNC: 3.8 G/DL
GLOBULIN SER-MCNC: 3.8 G/DL
GLUCOSE SERPL-MCNC: 99 MG/DL
GLUCOSE SERPL-MCNC: 99 MG/DL
LENGTH OF FAST TIME PATIENT: NORMAL H
LENGTH OF FAST TIME PATIENT: NORMAL H
MAGNESIUM SERPL-MCNC: 1.7 MG/DL
MAGNESIUM SERPL-MCNC: 1.7 MG/DL
POTASSIUM SERPL-SCNC: 4.9 MMOL/L
POTASSIUM SERPL-SCNC: 4.9 MMOL/L
PROT SERPL-MCNC: 6.9 G/DL
PROT SERPL-MCNC: 6.9 G/DL
SODIUM SERPL-SCNC: 140 MMOL/L
SODIUM SERPL-SCNC: 140 MMOL/L

## 2019-03-06 ENCOUNTER — DOCUMENTATION (OUTPATIENT)
Dept: CARDIOLOGY | Age: 54
End: 2019-03-06

## 2019-03-07 ENCOUNTER — DIAGNOSTIC TRANS (OUTPATIENT)
Dept: OTHER | Age: 54
End: 2019-03-07

## 2019-03-07 ENCOUNTER — HOSPITAL (OUTPATIENT)
Dept: OTHER | Age: 54
End: 2019-03-07
Attending: INTERNAL MEDICINE

## 2019-03-07 VITALS
BODY MASS INDEX: 57.39 KG/M2 | WEIGHT: 315 LBS | DIASTOLIC BLOOD PRESSURE: 72 MMHG | HEART RATE: 86 BPM | SYSTOLIC BLOOD PRESSURE: 100 MMHG

## 2019-03-07 LAB
ANION GAP SERPL CALC-SCNC: 13 MMOL/L (ref 10–20)
BUN SERPL-MCNC: 18 MG/DL (ref 6–20)
BUN/CREAT SERPL: 21 (ref 7–25)
CALCIUM SERPL-MCNC: 8.6 MG/DL (ref 8.4–10.2)
CHLORIDE: 104 MMOL/L (ref 98–107)
CO2 SERPL-SCNC: 28 MMOL/L (ref 21–32)
CREAT SERPL-MCNC: 0.87 MG/DL (ref 0.67–1.17)
GLUCOSE SERPL-MCNC: 96 MG/DL (ref 65–99)
POTASSIUM SERPL-SCNC: 4.4 MMOL/L (ref 3.4–5.1)
SODIUM SERPL-SCNC: 141 MMOL/L (ref 135–145)

## 2019-03-08 ENCOUNTER — TELEPHONE (OUTPATIENT)
Dept: CARDIOLOGY | Age: 54
End: 2019-03-08

## 2019-03-08 DIAGNOSIS — I48.91 ATRIAL FIBRILLATION WITH RVR (CMD): Primary | ICD-10-CM

## 2019-03-08 RX ORDER — DOFETILIDE 0.5 MG/1
500 CAPSULE ORAL 2 TIMES DAILY
Status: SHIPPED | COMMUNITY
Start: 2019-03-08 | End: 2020-02-01 | Stop reason: ALTCHOICE

## 2019-03-09 PROCEDURE — 81015 MICROSCOPIC EXAM OF URINE: CPT | Performed by: PHYSICIAN ASSISTANT

## 2019-03-11 ENCOUNTER — HOSPITAL (OUTPATIENT)
Dept: OTHER | Age: 54
End: 2019-03-11
Attending: INTERNAL MEDICINE

## 2019-03-11 ENCOUNTER — HOSPITAL (OUTPATIENT)
Dept: OTHER | Age: 54
End: 2019-03-11

## 2019-03-11 LAB
ANION GAP SERPL CALC-SCNC: 16 MMOL/L (ref 10–20)
BUN SERPL-MCNC: 13 MG/DL (ref 6–20)
BUN/CREAT SERPL: 17 (ref 7–25)
CALCIUM SERPL-MCNC: 8.1 MG/DL (ref 8.4–10.2)
CHLORIDE: 105 MMOL/L (ref 98–107)
CO2 SERPL-SCNC: 24 MMOL/L (ref 21–32)
CREAT SERPL-MCNC: 0.76 MG/DL (ref 0.67–1.17)
GLUCOSE SERPL-MCNC: 124 MG/DL (ref 65–99)
MAGNESIUM SERPL-MCNC: 1.7 MG/DL (ref 1.7–2.4)
POTASSIUM SERPL-SCNC: 3.8 MMOL/L (ref 3.4–5.1)
SODIUM SERPL-SCNC: 141 MMOL/L (ref 135–145)

## 2019-03-12 LAB
CREAT SERPL-MCNC: 0.81 MG/DL (ref 0.67–1.17)
MAGNESIUM SERPL-MCNC: 1.9 MG/DL (ref 1.7–2.4)
POTASSIUM SERPL-SCNC: 4 MMOL/L (ref 3.4–5.1)

## 2019-03-13 ENCOUNTER — CLINICAL ABSTRACT (OUTPATIENT)
Dept: CARDIOLOGY | Age: 54
End: 2019-03-13

## 2019-03-13 LAB
CREAT SERPL-MCNC: 0.71 MG/DL (ref 0.67–1.17)
MAGNESIUM SERPL-MCNC: 1.8 MG/DL (ref 1.7–2.4)
POTASSIUM SERPL-SCNC: 3.9 MMOL/L (ref 3.4–5.1)

## 2019-03-19 RX ORDER — SPIRONOLACTONE 25 MG/1
TABLET ORAL DAILY
COMMUNITY
Start: 2019-02-19 | End: 2020-02-01 | Stop reason: ALTCHOICE

## 2019-03-19 RX ORDER — METOPROLOL SUCCINATE 100 MG/1
100 TABLET, EXTENDED RELEASE ORAL 2 TIMES DAILY
COMMUNITY
Start: 2019-02-19 | End: 2020-09-09

## 2019-03-19 RX ORDER — LOSARTAN POTASSIUM 50 MG/1
1 TABLET ORAL DAILY
COMMUNITY
Start: 2016-08-08 | End: 2020-02-01 | Stop reason: ALTCHOICE

## 2019-03-19 RX ORDER — DILTIAZEM HYDROCHLORIDE EXTENDED-RELEASE TABLETS 240 MG/1
240 TABLET, EXTENDED RELEASE ORAL NIGHTLY
COMMUNITY
End: 2022-02-21 | Stop reason: SDUPTHER

## 2019-03-19 RX ORDER — ALFUZOSIN HYDROCHLORIDE 10 MG/1
TABLET, EXTENDED RELEASE ORAL
COMMUNITY
Start: 2019-02-19 | End: 2020-02-01 | Stop reason: ALTCHOICE

## 2019-03-19 RX ORDER — ATORVASTATIN CALCIUM 10 MG/1
10 TABLET, FILM COATED ORAL NIGHTLY
COMMUNITY
Start: 2019-02-19 | End: 2023-09-20 | Stop reason: ALTCHOICE

## 2019-03-19 RX ORDER — DIGOXIN 250 MCG
TABLET ORAL
COMMUNITY
Start: 2019-02-19 | End: 2020-02-01 | Stop reason: ALTCHOICE

## 2019-03-19 RX ORDER — BUMETANIDE 1 MG/1
1 TABLET ORAL 2 TIMES DAILY
COMMUNITY
Start: 2019-02-19 | End: 2020-09-09

## 2019-03-22 ENCOUNTER — CLINICAL ABSTRACT (OUTPATIENT)
Dept: CARDIOLOGY | Age: 54
End: 2019-03-22

## 2019-03-22 ENCOUNTER — OFFICE VISIT (OUTPATIENT)
Dept: CARDIOLOGY | Age: 54
End: 2019-03-22

## 2019-03-22 VITALS
WEIGHT: 315 LBS | HEART RATE: 68 BPM | HEIGHT: 70 IN | SYSTOLIC BLOOD PRESSURE: 120 MMHG | BODY MASS INDEX: 45.1 KG/M2 | OXYGEN SATURATION: 98 % | DIASTOLIC BLOOD PRESSURE: 74 MMHG

## 2019-03-22 DIAGNOSIS — E66.01 OBESITY, MORBID, BMI 50 OR HIGHER (CMD): ICD-10-CM

## 2019-03-22 DIAGNOSIS — I48.91 ATRIAL FIBRILLATION, UNSPECIFIED TYPE (CMD): Primary | ICD-10-CM

## 2019-03-22 DIAGNOSIS — R53.81 MALAISE AND FATIGUE: ICD-10-CM

## 2019-03-22 DIAGNOSIS — R53.83 MALAISE AND FATIGUE: ICD-10-CM

## 2019-03-22 DIAGNOSIS — I10 HYPERTENSION, BENIGN: ICD-10-CM

## 2019-03-22 PROCEDURE — 3074F SYST BP LT 130 MM HG: CPT | Performed by: NURSE PRACTITIONER

## 2019-03-22 PROCEDURE — 3078F DIAST BP <80 MM HG: CPT | Performed by: NURSE PRACTITIONER

## 2019-03-22 PROCEDURE — 93000 ELECTROCARDIOGRAM COMPLETE: CPT | Performed by: NURSE PRACTITIONER

## 2019-03-22 PROCEDURE — 99214 OFFICE O/P EST MOD 30 MIN: CPT | Performed by: NURSE PRACTITIONER

## 2019-03-22 SDOH — HEALTH STABILITY: MENTAL HEALTH: HOW OFTEN DO YOU HAVE A DRINK CONTAINING ALCOHOL?: NEVER

## 2019-03-22 SDOH — HEALTH STABILITY: PHYSICAL HEALTH: ON AVERAGE, HOW MANY DAYS PER WEEK DO YOU ENGAGE IN MODERATE TO STRENUOUS EXERCISE (LIKE A BRISK WALK)?: 0 DAYS

## 2019-03-22 SDOH — HEALTH STABILITY: PHYSICAL HEALTH: ON AVERAGE, HOW MANY MINUTES DO YOU ENGAGE IN EXERCISE AT THIS LEVEL?: 0 MIN

## 2019-03-22 ASSESSMENT — PATIENT HEALTH QUESTIONNAIRE - PHQ9
SUM OF ALL RESPONSES TO PHQ9 QUESTIONS 1 AND 2: 0
1. LITTLE INTEREST OR PLEASURE IN DOING THINGS: NOT AT ALL
SUM OF ALL RESPONSES TO PHQ9 QUESTIONS 1 AND 2: 0
2. FEELING DOWN, DEPRESSED OR HOPELESS: NOT AT ALL

## 2019-03-22 ASSESSMENT — ENCOUNTER SYMPTOMS: SHORTNESS OF BREATH: 1

## 2019-03-26 RX ORDER — HYDROCODONE BITARTRATE AND ACETAMINOPHEN 10; 325 MG/1; MG/1
1 TABLET ORAL
COMMUNITY
Start: 2019-03-20 | End: 2019-03-31

## 2019-04-05 ENCOUNTER — TELEPHONE (OUTPATIENT)
Dept: CARDIOLOGY | Age: 54
End: 2019-04-05

## 2019-04-08 ENCOUNTER — TELEPHONE (OUTPATIENT)
Dept: CARDIOLOGY | Age: 54
End: 2019-04-08

## 2019-04-10 ENCOUNTER — OFF PREMISE (OUTPATIENT)
Dept: CARDIOLOGY | Age: 54
End: 2019-04-10

## 2019-04-10 ENCOUNTER — OFFICE VISIT (OUTPATIENT)
Dept: CARDIOLOGY | Age: 54
End: 2019-04-10

## 2019-04-10 ENCOUNTER — DOCUMENTATION (OUTPATIENT)
Dept: CARDIOLOGY | Age: 54
End: 2019-04-10

## 2019-04-10 VITALS
HEART RATE: 72 BPM | BODY MASS INDEX: 45.1 KG/M2 | SYSTOLIC BLOOD PRESSURE: 122 MMHG | DIASTOLIC BLOOD PRESSURE: 82 MMHG | WEIGHT: 315 LBS | HEIGHT: 70 IN | RESPIRATION RATE: 16 BRPM

## 2019-04-10 DIAGNOSIS — I48.91 ATRIAL FIBRILLATION STATUS POST CARDIOVERSION (CMD): ICD-10-CM

## 2019-04-10 DIAGNOSIS — I48.91 ATRIAL FIBRILLATION WITH CONTROLLED VENTRICULAR RESPONSE (CMD): Primary | ICD-10-CM

## 2019-04-10 PROCEDURE — 93000 ELECTROCARDIOGRAM COMPLETE: CPT | Performed by: INTERNAL MEDICINE

## 2019-04-10 PROCEDURE — 3074F SYST BP LT 130 MM HG: CPT | Performed by: INTERNAL MEDICINE

## 2019-04-10 PROCEDURE — 99215 OFFICE O/P EST HI 40 MIN: CPT | Performed by: INTERNAL MEDICINE

## 2019-04-10 PROCEDURE — 3079F DIAST BP 80-89 MM HG: CPT | Performed by: INTERNAL MEDICINE

## 2019-04-10 ASSESSMENT — PATIENT HEALTH QUESTIONNAIRE - PHQ9
SUM OF ALL RESPONSES TO PHQ9 QUESTIONS 1 AND 2: 0
SUM OF ALL RESPONSES TO PHQ9 QUESTIONS 1 AND 2: 0
1. LITTLE INTEREST OR PLEASURE IN DOING THINGS: NOT AT ALL
2. FEELING DOWN, DEPRESSED OR HOPELESS: NOT AT ALL

## 2019-04-17 ENCOUNTER — APPOINTMENT (OUTPATIENT)
Dept: CARDIOLOGY | Age: 54
End: 2019-04-17

## 2019-04-19 ENCOUNTER — HOSPITAL (OUTPATIENT)
Dept: OTHER | Age: 54
End: 2019-04-19
Attending: EMERGENCY MEDICINE

## 2019-04-19 LAB
ALBUMIN SERPL-MCNC: 3.2 GM/DL (ref 3.6–5.1)
ALBUMIN/GLOB SERPL: 0.6 {RATIO} (ref 1–2.4)
ALP SERPL-CCNC: 79 UNIT/L (ref 45–117)
ALT SERPL-CCNC: 28 UNIT/L
AMORPH SED URNS QL MICRO: ABNORMAL
ANALYZER ANC (IANC): ABNORMAL
ANION GAP SERPL CALC-SCNC: 14 MMOL/L (ref 10–20)
APPEARANCE UR: ABNORMAL
AST SERPL-CCNC: 23 UNIT/L
BACTERIA #/AREA URNS HPF: ABNORMAL /HPF
BASOPHILS # BLD: 0.1 THOUSAND/MCL (ref 0–0.3)
BASOPHILS NFR BLD: 0 %
BILIRUB SERPL-MCNC: 0.3 MG/DL (ref 0.2–1)
BILIRUB UR QL: NEGATIVE
BUN SERPL-MCNC: 13 MG/DL (ref 6–20)
BUN/CREAT SERPL: 18 (ref 7–25)
CALCIUM SERPL-MCNC: 8.8 MG/DL (ref 8.4–10.2)
CAOX CRY URNS QL MICRO: ABNORMAL
CHLORIDE: 102 MMOL/L (ref 98–107)
CO2 SERPL-SCNC: 26 MMOL/L (ref 21–32)
COLOR UR: ABNORMAL
CREAT SERPL-MCNC: 0.74 MG/DL (ref 0.67–1.17)
DIFFERENTIAL METHOD BLD: ABNORMAL
EOSINOPHIL # BLD: 0.2 THOUSAND/MCL (ref 0.1–0.5)
EOSINOPHIL NFR BLD: 2 %
EPITH CASTS #/AREA URNS LPF: ABNORMAL /[LPF]
ERYTHROCYTE [DISTWIDTH] IN BLOOD: 13.8 % (ref 11–15)
FATTY CASTS #/AREA URNS LPF: ABNORMAL /[LPF]
GLOBULIN SER-MCNC: 5 GM/DL (ref 2–4)
GLUCOSE SERPL-MCNC: 107 MG/DL (ref 65–99)
GLUCOSE UR-MCNC: NEGATIVE MG/DL
GRAN CASTS #/AREA URNS LPF: ABNORMAL /[LPF]
HEMATOCRIT: 40.7 % (ref 39–51)
HGB BLD-MCNC: 13.7 GM/DL (ref 13–17)
HGB UR QL: ABNORMAL
HYALINE CASTS #/AREA URNS LPF: ABNORMAL /LPF (ref 0–5)
IMM GRANULOCYTES # BLD AUTO: 0.1 THOUSAND/MCL (ref 0–0.2)
IMM GRANULOCYTES NFR BLD: 1 %
KETONES UR-MCNC: NEGATIVE MG/DL
LEUKOCYTE ESTERASE UR QL STRIP: NEGATIVE
LYMPHOCYTES # BLD: 2.2 THOUSAND/MCL (ref 1–4)
LYMPHOCYTES NFR BLD: 18 %
MCH RBC QN AUTO: 31.8 PG (ref 26–34)
MCHC RBC AUTO-ENTMCNC: 33.7 GM/DL (ref 32–36.5)
MCV RBC AUTO: 94.4 FL (ref 78–100)
MIXED CELL CASTS #/AREA URNS LPF: ABNORMAL /[LPF]
MONOCYTES # BLD: 1.1 THOUSAND/MCL (ref 0.3–0.9)
MONOCYTES NFR BLD: 9 %
MUCOUS THREADS URNS QL MICRO: PRESENT
NEUTROPHILS # BLD: 8.8 THOUSAND/MCL (ref 1.8–7.7)
NEUTROPHILS NFR BLD: 70 %
NEUTS SEG NFR BLD: ABNORMAL %
NITRITE UR QL: NEGATIVE
NRBC (NRBCRE): 0 /100 WBC
PH UR: 6 UNIT (ref 5–7)
PLATELET # BLD: 217 THOUSAND/MCL (ref 140–450)
POTASSIUM SERPL-SCNC: 3.7 MMOL/L (ref 3.4–5.1)
PROT SERPL-MCNC: 8.2 GM/DL (ref 6.4–8.2)
PROT UR QL: 100 MG/DL
RBC # BLD: 4.31 MILLION/MCL (ref 4.5–5.9)
RBC #/AREA URNS HPF: >100 /HPF (ref 0–2)
RBC CASTS #/AREA URNS LPF: ABNORMAL /[LPF]
RENAL EPI CELLS #/AREA URNS HPF: ABNORMAL /[HPF]
SODIUM SERPL-SCNC: 138 MMOL/L (ref 135–145)
SP GR UR: 1.02 (ref 1–1.03)
SPECIMEN SOURCE: ABNORMAL
SPERM URNS QL MICRO: ABNORMAL
SQUAMOUS #/AREA URNS HPF: ABNORMAL /HPF (ref 0–5)
T VAGINALIS URNS QL MICRO: ABNORMAL
TRI-PHOS CRY URNS QL MICRO: ABNORMAL
URATE CRY URNS QL MICRO: ABNORMAL
URINE REFLEX: ABNORMAL
URNS CMNT MICRO: ABNORMAL
UROBILINOGEN UR QL: 0.2 MG/DL (ref 0–1)
WAXY CASTS #/AREA URNS LPF: ABNORMAL /[LPF]
WBC # BLD: 12.5 THOUSAND/MCL (ref 4.2–11)
WBC #/AREA URNS HPF: ABNORMAL /HPF (ref 0–5)
WBC CASTS #/AREA URNS LPF: ABNORMAL /[LPF]
YEAST HYPHAE URNS QL MICRO: ABNORMAL
YEAST URNS QL MICRO: PRESENT

## 2019-04-26 ENCOUNTER — TELEPHONE (OUTPATIENT)
Dept: CARDIOLOGY | Age: 54
End: 2019-04-26

## 2019-04-26 RX ORDER — HYDROCODONE BITARTRATE AND ACETAMINOPHEN 10; 325 MG/1; MG/1
10-325 TABLET ORAL PRN
Refills: 0 | COMMUNITY
Start: 2019-04-04 | End: 2020-02-01 | Stop reason: ALTCHOICE

## 2019-04-26 RX ORDER — TAMSULOSIN HYDROCHLORIDE 0.4 MG/1
0.4 CAPSULE ORAL DAILY
COMMUNITY
Start: 2019-01-10 | End: 2020-02-01 | Stop reason: ALTCHOICE

## 2019-04-26 RX ORDER — BUDESONIDE AND FORMOTEROL FUMARATE DIHYDRATE 160; 4.5 UG/1; UG/1
2 AEROSOL RESPIRATORY (INHALATION) PRN
COMMUNITY
Start: 2018-11-19 | End: 2020-02-01 | Stop reason: ALTCHOICE

## 2019-05-01 ENCOUNTER — OFFICE VISIT (OUTPATIENT)
Dept: CARDIOLOGY | Age: 54
End: 2019-05-01

## 2019-05-01 VITALS
WEIGHT: 315 LBS | DIASTOLIC BLOOD PRESSURE: 76 MMHG | HEIGHT: 70 IN | RESPIRATION RATE: 14 BRPM | SYSTOLIC BLOOD PRESSURE: 118 MMHG | HEART RATE: 62 BPM | BODY MASS INDEX: 45.1 KG/M2

## 2019-05-01 DIAGNOSIS — E66.01 OBESITY, CLASS III, BMI 40-49.9 (MORBID OBESITY) (CMD): ICD-10-CM

## 2019-05-01 DIAGNOSIS — R07.9 CHEST PAIN, UNSPECIFIED TYPE: ICD-10-CM

## 2019-05-01 DIAGNOSIS — I48.0 PAROXYSMAL A-FIB (CMD): Primary | ICD-10-CM

## 2019-05-01 PROCEDURE — 3078F DIAST BP <80 MM HG: CPT | Performed by: INTERNAL MEDICINE

## 2019-05-01 PROCEDURE — 93000 ELECTROCARDIOGRAM COMPLETE: CPT | Performed by: INTERNAL MEDICINE

## 2019-05-01 PROCEDURE — 99215 OFFICE O/P EST HI 40 MIN: CPT | Performed by: INTERNAL MEDICINE

## 2019-05-01 PROCEDURE — 3074F SYST BP LT 130 MM HG: CPT | Performed by: INTERNAL MEDICINE

## 2019-05-01 SDOH — HEALTH STABILITY: PHYSICAL HEALTH: ON AVERAGE, HOW MANY MINUTES DO YOU ENGAGE IN EXERCISE AT THIS LEVEL?: 60 MIN

## 2019-05-01 SDOH — HEALTH STABILITY: PHYSICAL HEALTH: ON AVERAGE, HOW MANY DAYS PER WEEK DO YOU ENGAGE IN MODERATE TO STRENUOUS EXERCISE (LIKE A BRISK WALK)?: 2 DAYS

## 2019-05-01 ASSESSMENT — PATIENT HEALTH QUESTIONNAIRE - PHQ9
2. FEELING DOWN, DEPRESSED OR HOPELESS: NOT AT ALL
SUM OF ALL RESPONSES TO PHQ9 QUESTIONS 1 AND 2: 0
SUM OF ALL RESPONSES TO PHQ9 QUESTIONS 1 AND 2: 0
1. LITTLE INTEREST OR PLEASURE IN DOING THINGS: NOT AT ALL

## 2019-05-07 PROCEDURE — 88108 CYTOPATH CONCENTRATE TECH: CPT | Performed by: UROLOGY

## 2019-05-14 PROCEDURE — 81003 URINALYSIS AUTO W/O SCOPE: CPT | Performed by: UROLOGY

## 2019-06-13 ENCOUNTER — HOSPITAL (OUTPATIENT)
Dept: OTHER | Age: 54
End: 2019-06-13

## 2019-06-13 LAB
ANALYZER ANC (IANC): ABNORMAL
ANION GAP SERPL CALC-SCNC: 9 MMOL/L (ref 10–20)
BASOPHILS # BLD: 0.1 K/MCL (ref 0–0.3)
BASOPHILS NFR BLD: 1 %
BUN SERPL-MCNC: 14 MG/DL (ref 6–20)
BUN/CREAT SERPL: 22 (ref 7–25)
CALCIUM SERPL-MCNC: 9.1 MG/DL (ref 8.4–10.2)
CHLORIDE SERPL-SCNC: 109 MMOL/L (ref 98–107)
CO2 SERPL-SCNC: 26 MMOL/L (ref 21–32)
CREAT SERPL-MCNC: 0.64 MG/DL (ref 0.67–1.17)
DIFFERENTIAL METHOD BLD: ABNORMAL
EOSINOPHIL # BLD: 0.2 K/MCL (ref 0.1–0.5)
EOSINOPHIL NFR BLD: 3 %
ERYTHROCYTE [DISTWIDTH] IN BLOOD: 13.8 % (ref 11–15)
GLUCOSE SERPL-MCNC: 99 MG/DL (ref 65–99)
HCT VFR BLD CALC: 40.1 % (ref 39–51)
HGB BLD-MCNC: 13.3 G/DL (ref 13–17)
IMM GRANULOCYTES # BLD AUTO: 0.1 K/MCL (ref 0–0.2)
IMM GRANULOCYTES NFR BLD: 1 %
LYMPHOCYTES # BLD: 1.4 K/MCL (ref 1–4)
LYMPHOCYTES NFR BLD: 17 %
MCH RBC QN AUTO: 31.1 PG (ref 26–34)
MCHC RBC AUTO-ENTMCNC: 33.2 G/DL (ref 32–36.5)
MCV RBC AUTO: 93.9 FL (ref 78–100)
MONOCYTES # BLD: 0.9 K/MCL (ref 0.3–0.9)
MONOCYTES NFR BLD: 11 %
NEUTROPHILS # BLD: 5.6 K/MCL (ref 1.8–7.7)
NEUTROPHILS NFR BLD: 67 %
NEUTS SEG NFR BLD: ABNORMAL %
NRBC (NRBCRE): 0 /100 WBC
PLATELET # BLD: 186 K/MCL (ref 140–450)
POTASSIUM SERPL-SCNC: 4.1 MMOL/L (ref 3.4–5.1)
RBC # BLD: 4.27 MIL/MCL (ref 4.5–5.9)
SODIUM SERPL-SCNC: 140 MMOL/L (ref 135–145)
WBC # BLD: 8.3 K/MCL (ref 4.2–11)

## 2019-08-20 PROBLEM — H90.3 SENSORINEURAL HEARING LOSS (SNHL) OF BOTH EARS: Status: ACTIVE | Noted: 2019-08-20

## 2019-08-20 PROBLEM — M54.41 CHRONIC MIDLINE LOW BACK PAIN WITH RIGHT-SIDED SCIATICA: Status: ACTIVE | Noted: 2017-07-18

## 2019-08-20 PROBLEM — F33.1 MODERATE EPISODE OF RECURRENT MAJOR DEPRESSIVE DISORDER (HCC): Status: ACTIVE | Noted: 2019-08-20

## 2019-08-20 PROBLEM — G89.29 CHRONIC MIDLINE LOW BACK PAIN WITH RIGHT-SIDED SCIATICA: Status: ACTIVE | Noted: 2017-07-18

## 2019-09-11 ENCOUNTER — HOSPITAL (OUTPATIENT)
Dept: OTHER | Age: 54
End: 2019-09-11
Attending: ORTHOPAEDIC SURGERY

## 2019-09-13 PROBLEM — M48.07 FORAMINAL STENOSIS OF LUMBOSACRAL REGION: Status: ACTIVE | Noted: 2019-09-13

## 2019-11-06 ENCOUNTER — APPOINTMENT (OUTPATIENT)
Dept: CARDIOLOGY | Age: 54
End: 2019-11-06

## 2020-01-04 ENCOUNTER — APPOINTMENT (OUTPATIENT)
Dept: GENERAL RADIOLOGY | Facility: HOSPITAL | Age: 55
DRG: 309 | End: 2020-01-04
Attending: EMERGENCY MEDICINE
Payer: MEDICAID

## 2020-01-04 ENCOUNTER — HOSPITAL ENCOUNTER (INPATIENT)
Facility: HOSPITAL | Age: 55
LOS: 3 days | Discharge: HOME | DRG: 309 | End: 2020-01-07
Attending: EMERGENCY MEDICINE | Admitting: INTERNAL MEDICINE
Payer: MEDICAID

## 2020-01-04 DIAGNOSIS — I48.91 ATRIAL FIBRILLATION WITH RAPID VENTRICULAR RESPONSE (HCC): Primary | ICD-10-CM

## 2020-01-04 LAB
ALBUMIN SERPL-MCNC: 3.6 G/DL (ref 3.4–5)
ALBUMIN/GLOB SERPL: 0.9 {RATIO} (ref 1–2)
ALP LIVER SERPL-CCNC: 91 U/L (ref 45–117)
ALT SERPL-CCNC: 40 U/L (ref 16–61)
ANION GAP SERPL CALC-SCNC: 10 MMOL/L (ref 0–18)
APTT PPP: 23.1 SECONDS (ref 25.4–36.1)
APTT PPP: 44 SECONDS (ref 25.4–36.1)
AST SERPL-CCNC: 45 U/L (ref 15–37)
ATRIAL RATE: 375 BPM
BASOPHILS # BLD AUTO: 0.04 X10(3) UL (ref 0–0.2)
BASOPHILS NFR BLD AUTO: 0.5 %
BILIRUB SERPL-MCNC: 0.9 MG/DL (ref 0.1–2)
BUN BLD-MCNC: 18 MG/DL (ref 7–18)
BUN/CREAT SERPL: 24 (ref 10–20)
CALCIUM BLD-MCNC: 8.6 MG/DL (ref 8.5–10.1)
CHLORIDE SERPL-SCNC: 110 MMOL/L (ref 98–112)
CO2 SERPL-SCNC: 21 MMOL/L (ref 21–32)
CREAT BLD-MCNC: 0.75 MG/DL (ref 0.7–1.3)
DEPRECATED RDW RBC AUTO: 44.9 FL (ref 35.1–46.3)
EOSINOPHIL # BLD AUTO: 0.06 X10(3) UL (ref 0–0.7)
EOSINOPHIL NFR BLD AUTO: 0.8 %
ERYTHROCYTE [DISTWIDTH] IN BLOOD BY AUTOMATED COUNT: 13.8 % (ref 11–15)
GLOBULIN PLAS-MCNC: 3.9 G/DL (ref 2.8–4.4)
GLUCOSE BLD-MCNC: 107 MG/DL (ref 70–99)
HCT VFR BLD AUTO: 43.7 % (ref 39–53)
HGB BLD-MCNC: 14.9 G/DL (ref 13–17.5)
IMM GRANULOCYTES # BLD AUTO: 0.02 X10(3) UL (ref 0–1)
IMM GRANULOCYTES NFR BLD: 0.3 %
INR BLD: 1 (ref 0.9–1.1)
LYMPHOCYTES # BLD AUTO: 1.77 X10(3) UL (ref 1–4)
LYMPHOCYTES NFR BLD AUTO: 23.3 %
M PROTEIN MFR SERPL ELPH: 7.5 G/DL (ref 6.4–8.2)
MCH RBC QN AUTO: 30.4 PG (ref 26–34)
MCHC RBC AUTO-ENTMCNC: 34.1 G/DL (ref 31–37)
MCV RBC AUTO: 89.2 FL (ref 80–100)
MONOCYTES # BLD AUTO: 0.98 X10(3) UL (ref 0.1–1)
MONOCYTES NFR BLD AUTO: 12.9 %
NEUTROPHILS # BLD AUTO: 4.72 X10 (3) UL (ref 1.5–7.7)
NEUTROPHILS # BLD AUTO: 4.72 X10(3) UL (ref 1.5–7.7)
NEUTROPHILS NFR BLD AUTO: 62.2 %
OSMOLALITY SERPL CALC.SUM OF ELEC: 294 MOSM/KG (ref 275–295)
PLATELET # BLD AUTO: 192 10(3)UL (ref 150–450)
POTASSIUM SERPL-SCNC: 4.9 MMOL/L (ref 3.5–5.1)
PSA SERPL DL<=0.01 NG/ML-MCNC: 13.6 SECONDS (ref 12.5–14.7)
Q-T INTERVAL: 300 MS
QRS DURATION: 88 MS
QTC CALCULATION (BEZET): 498 MS
R AXIS: 120 DEGREES
RBC # BLD AUTO: 4.9 X10(6)UL (ref 4.3–5.7)
SODIUM SERPL-SCNC: 141 MMOL/L (ref 136–145)
T AXIS: 1 DEGREES
TROPONIN I SERPL-MCNC: <0.045 NG/ML (ref ?–0.04)
VENTRICULAR RATE: 166 BPM
WBC # BLD AUTO: 7.6 X10(3) UL (ref 4–11)

## 2020-01-04 PROCEDURE — 84484 ASSAY OF TROPONIN QUANT: CPT | Performed by: EMERGENCY MEDICINE

## 2020-01-04 PROCEDURE — 71045 X-RAY EXAM CHEST 1 VIEW: CPT | Performed by: EMERGENCY MEDICINE

## 2020-01-04 PROCEDURE — 85730 THROMBOPLASTIN TIME PARTIAL: CPT | Performed by: INTERNAL MEDICINE

## 2020-01-04 PROCEDURE — 93005 ELECTROCARDIOGRAM TRACING: CPT

## 2020-01-04 PROCEDURE — 85610 PROTHROMBIN TIME: CPT | Performed by: EMERGENCY MEDICINE

## 2020-01-04 PROCEDURE — 99285 EMERGENCY DEPT VISIT HI MDM: CPT

## 2020-01-04 PROCEDURE — 96367 TX/PROPH/DG ADDL SEQ IV INF: CPT

## 2020-01-04 PROCEDURE — 85025 COMPLETE CBC W/AUTO DIFF WBC: CPT | Performed by: EMERGENCY MEDICINE

## 2020-01-04 PROCEDURE — 80053 COMPREHEN METABOLIC PANEL: CPT | Performed by: EMERGENCY MEDICINE

## 2020-01-04 PROCEDURE — 85730 THROMBOPLASTIN TIME PARTIAL: CPT | Performed by: EMERGENCY MEDICINE

## 2020-01-04 PROCEDURE — 96365 THER/PROPH/DIAG IV INF INIT: CPT

## 2020-01-04 PROCEDURE — 93010 ELECTROCARDIOGRAM REPORT: CPT

## 2020-01-04 PROCEDURE — HZ2ZZZZ DETOXIFICATION SERVICES FOR SUBSTANCE ABUSE TREATMENT: ICD-10-PCS | Performed by: INTERNAL MEDICINE

## 2020-01-04 PROCEDURE — 99291 CRITICAL CARE FIRST HOUR: CPT

## 2020-01-04 PROCEDURE — 96366 THER/PROPH/DIAG IV INF ADDON: CPT

## 2020-01-04 RX ORDER — LORAZEPAM 2 MG/ML
1 INJECTION INTRAMUSCULAR
Status: DISCONTINUED | OUTPATIENT
Start: 2020-01-04 | End: 2020-01-06

## 2020-01-04 RX ORDER — ALFUZOSIN HYDROCHLORIDE 10 MG/1
10 TABLET, EXTENDED RELEASE ORAL
Status: DISCONTINUED | OUTPATIENT
Start: 2020-01-05 | End: 2020-01-07

## 2020-01-04 RX ORDER — HEPARIN SODIUM AND DEXTROSE 10000; 5 [USP'U]/100ML; G/100ML
INJECTION INTRAVENOUS CONTINUOUS
Status: DISCONTINUED | OUTPATIENT
Start: 2020-01-04 | End: 2020-01-06

## 2020-01-04 RX ORDER — ENOXAPARIN SODIUM 100 MG/ML
1 INJECTION SUBCUTANEOUS ONCE
Status: DISCONTINUED | OUTPATIENT
Start: 2020-01-04 | End: 2020-01-04

## 2020-01-04 RX ORDER — METOPROLOL SUCCINATE 25 MG/1
25 TABLET, EXTENDED RELEASE ORAL
Status: DISCONTINUED | OUTPATIENT
Start: 2020-01-04 | End: 2020-01-06

## 2020-01-04 RX ORDER — DOXEPIN HYDROCHLORIDE 50 MG/1
1 CAPSULE ORAL DAILY
Status: DISCONTINUED | OUTPATIENT
Start: 2020-01-04 | End: 2020-01-07

## 2020-01-04 RX ORDER — LORAZEPAM 2 MG/ML
0.5 INJECTION INTRAMUSCULAR EVERY 6 HOURS PRN
Status: DISCONTINUED | OUTPATIENT
Start: 2020-01-04 | End: 2020-01-04

## 2020-01-04 RX ORDER — FOLIC ACID 1 MG/1
1 TABLET ORAL DAILY
Status: DISCONTINUED | OUTPATIENT
Start: 2020-01-04 | End: 2020-01-07

## 2020-01-04 RX ORDER — ASPIRIN 81 MG/1
81 TABLET ORAL
Status: DISCONTINUED | OUTPATIENT
Start: 2020-01-04 | End: 2020-01-06

## 2020-01-04 RX ORDER — LORAZEPAM 1 MG/1
2 TABLET ORAL
Status: DISCONTINUED | OUTPATIENT
Start: 2020-01-04 | End: 2020-01-06

## 2020-01-04 RX ORDER — GABAPENTIN 300 MG/1
600 CAPSULE ORAL 3 TIMES DAILY
Status: DISCONTINUED | OUTPATIENT
Start: 2020-01-04 | End: 2020-01-07

## 2020-01-04 RX ORDER — MELATONIN
100 DAILY
Status: DISCONTINUED | OUTPATIENT
Start: 2020-01-04 | End: 2020-01-07

## 2020-01-04 RX ORDER — SPIRONOLACTONE 25 MG/1
25 TABLET ORAL DAILY
Status: DISCONTINUED | OUTPATIENT
Start: 2020-01-04 | End: 2020-01-04

## 2020-01-04 RX ORDER — TRAZODONE HYDROCHLORIDE 50 MG/1
25 TABLET ORAL NIGHTLY PRN
Status: DISCONTINUED | OUTPATIENT
Start: 2020-01-04 | End: 2020-01-07

## 2020-01-04 RX ORDER — SODIUM CHLORIDE 9 MG/ML
INJECTION, SOLUTION INTRAVENOUS CONTINUOUS
Status: DISCONTINUED | OUTPATIENT
Start: 2020-01-04 | End: 2020-01-07

## 2020-01-04 RX ORDER — HEPARIN SODIUM 5000 [USP'U]/ML
5000 INJECTION INTRAVENOUS; SUBCUTANEOUS ONCE
Status: COMPLETED | OUTPATIENT
Start: 2020-01-04 | End: 2020-01-04

## 2020-01-04 RX ORDER — LORAZEPAM 2 MG/ML
2 INJECTION INTRAMUSCULAR
Status: DISCONTINUED | OUTPATIENT
Start: 2020-01-04 | End: 2020-01-06

## 2020-01-04 RX ORDER — DIGOXIN 125 MCG
250 TABLET ORAL DAILY
Status: DISCONTINUED | OUTPATIENT
Start: 2020-01-04 | End: 2020-01-06

## 2020-01-04 RX ORDER — LORAZEPAM 2 MG/ML
1 INJECTION INTRAMUSCULAR EVERY 6 HOURS PRN
Status: DISCONTINUED | OUTPATIENT
Start: 2020-01-04 | End: 2020-01-04

## 2020-01-04 RX ORDER — LORAZEPAM 1 MG/1
1 TABLET ORAL
Status: DISCONTINUED | OUTPATIENT
Start: 2020-01-04 | End: 2020-01-06

## 2020-01-04 RX ORDER — ATORVASTATIN CALCIUM 10 MG/1
10 TABLET, FILM COATED ORAL NIGHTLY
Status: DISCONTINUED | OUTPATIENT
Start: 2020-01-04 | End: 2020-01-07

## 2020-01-04 RX ORDER — ESCITALOPRAM OXALATE 10 MG/1
10 TABLET ORAL DAILY
Status: DISCONTINUED | OUTPATIENT
Start: 2020-01-04 | End: 2020-01-07

## 2020-01-04 RX ORDER — DILTIAZEM HYDROCHLORIDE 5 MG/ML
10 INJECTION INTRAVENOUS ONCE
Status: COMPLETED | OUTPATIENT
Start: 2020-01-04 | End: 2020-01-04

## 2020-01-04 RX ORDER — HEPARIN SODIUM AND DEXTROSE 10000; 5 [USP'U]/100ML; G/100ML
1000 INJECTION INTRAVENOUS ONCE
Status: COMPLETED | OUTPATIENT
Start: 2020-01-04 | End: 2020-01-04

## 2020-01-04 NOTE — PLAN OF CARE
Aox4, room air, complains of chest palpitations, SOB w/exertion,  cardizem drip 20 mg/hr, heparin 10 mL/hr, 's with activity, HR lowered 90's and low 100's.  VSS, afib on tele, 0.9 NS 75 mL/hr, safety precautions in place; bed in lowest position, call and administer replacement therapy as ordered  Outcome: Progressing     Problem: SAFETY ADULT - FALL  Goal: Free from fall injury  Description  INTERVENTIONS:  - Assess pt frequently for physical needs  - Identify cognitive and physical deficits and behavi

## 2020-01-04 NOTE — ED NOTES
Received call from [address] [place] [address] Rd room is clean. Updated MD on VS, sts ok to transport to floor at this time.

## 2020-01-04 NOTE — ED NOTES
#Extensive amount of time educating pt on importance of medication administration. Pt sts he stopped taking all his meds 2 years ago. Pt verbalizes understanding, and sts he is now scared.

## 2020-01-04 NOTE — CONSULTS
[facility] Cardiology Consultation    Reji Abdul Patient Status:  Inpatient    1965 MRN [id number]   [facility] 2NE-A Attending Flip Wilkins MD   Hosp Day # 0 PCP Sawyer Shore MD     Reason for Consultation:  AF      Hist and are negative except as described above in HPI.     Physical Exam:      Temp:  [98.2 °F (36.8 °C)-98.8 °F (37.1 °C)] 98.8 °F (37.1 °C)  Pulse:  [110-168] 110  Resp:  [18-25] 19  BP: (125-144)/(73-96) 133/73    Wt Readings from Last 3 Encounters:  01/04/2

## 2020-01-04 NOTE — ED PROVIDER NOTES
Patient Seen in: [facility] Emergency Department      History   Patient presents with:  Chest Pain Angina    Stated Complaint:     HPI    69-year-old male presents with chest pain, dyspnea on exertion, general fatigue.   He is a history of atrial fib Resp 22   Temp 98.8 °F (37.1 °C)   Temp src Temporal   SpO2 98 %   O2 Device None (Room air)       Current:BP (!) 139/92   Pulse (!) 155   Temp 98.8 °F (37.1 °C) (Temporal)   Resp 20   Ht 177.8 cm (5' 10\")   Wt (!) 190.5 kg   SpO2 98%   BMI 60.26 kg/m² ischemia. Xr Chest Ap Portable  (cpt=71045)    Result Date: 1/4/2020  PROCEDURE:  XR CHEST AP PORTABLE  (CPT=71045)  TECHNIQUE:  AP chest radiograph was obtained. COMPARISON:  None.   INDICATIONS:  cp  PATIENT STATED HISTORY: (As transcribed b Disposition and Plan     Clinical Impression:  Atrial fibrillation with rapid ventricular response ([facility] [address]  (primary encounter diagnosis)    Disposition:  Admit  1/4/2020 11:35 am    Follow-up:  No follow-up provider specified.       Medications

## 2020-01-04 NOTE — ED INITIAL ASSESSMENT (HPI)
Pt to #ED with c/o #CP that started 1.5 days ago. Pt sts history of afib, went off meds 2 years ago after cardioversion. Pt went off of his meds on his own.

## 2020-01-05 ENCOUNTER — APPOINTMENT (OUTPATIENT)
Dept: CV DIAGNOSTICS | Facility: HOSPITAL | Age: 55
DRG: 309 | End: 2020-01-05
Attending: HOSPITALIST
Payer: MEDICAID

## 2020-01-05 LAB — APTT PPP: 50.1 SECONDS (ref 25.4–36.1)

## 2020-01-05 PROCEDURE — 85730 THROMBOPLASTIN TIME PARTIAL: CPT | Performed by: HOSPITALIST

## 2020-01-05 PROCEDURE — 93306 TTE W/DOPPLER COMPLETE: CPT | Performed by: HOSPITALIST

## 2020-01-05 RX ORDER — LORAZEPAM 1 MG/1
1 TABLET ORAL EVERY 4 HOURS PRN
Status: DISCONTINUED | OUTPATIENT
Start: 2020-01-05 | End: 2020-01-07

## 2020-01-05 RX ORDER — LORAZEPAM 0.5 MG/1
0.5 TABLET ORAL EVERY 4 HOURS PRN
Status: DISCONTINUED | OUTPATIENT
Start: 2020-01-05 | End: 2020-01-07

## 2020-01-05 RX ORDER — ACETAMINOPHEN 325 MG/1
650 TABLET ORAL EVERY 6 HOURS PRN
Status: DISCONTINUED | OUTPATIENT
Start: 2020-01-05 | End: 2020-01-07

## 2020-01-05 NOTE — PROGRESS NOTES
[facility] Hospitalist Progress Note     PCP: Joselin Cool MD    Chief Complaint: follow-up    Overnight/Interim Events:      SUBJECTIVE:  tx to med/psych o/n for possible w/drawl. Had anxiety/HA. 113/53, 98. On 2L. On dilt gtt and heparin.  Some SOB c mo 100 mg Oral Daily   • folic acid  1 mg Oral Daily   • multivitamin  1 tablet Oral Daily     • diltiazem 10 mg/hr (01/05/20 1340)   • Continuous dose Heparin infusion 1,200 Units/hr (01/05/20 1150)   • sodium chloride 75 mL/hr at 01/04/20 1415     acetamino

## 2020-01-05 NOTE — PAYOR COMM NOTE
--------------  ADMISSION REVIEW     Payor: Saeed Salazar #:  [id number]  Authorization Number: N/A    Admit date: 1/4/20  Admit time: [facility]       Admitting Physician: Herber Pulliam MD  Attending Physician:   Jeanna Porter SpO2 98%   BMI 60.26 kg/m²      Physical Exam    General:  Vitals as listed. No acute distress   HEENT: Sclerae anicteric. Conjunctivae show no pallor.   Oropharynx clear, mucous membranes moist   Neck: supple, no rigidity   Lungs: good air exchange and transcribed by Technologist)  Shortness of breath and chest pain this morning. FINDINGS:  Cardiac size and mediastinal contours are normal.  Lungs are clear if not slightly hypoinflated. No effusion. No pneumothorax.   No evidence of airspace [facility] response [facility]) I48.91 1/4/2020 Unknown                H&P - H&P Note      H&P signed by Nora Buck MD at 1/4/2020  7:07 PM     Author:  Nora Buck MD Service:  Hospitalist Author Type:  Physician    Filed:  1/4/2020  7:07 PM Date of Ser Problem Relation Age of Onset   • Aldara Father    • No Known Problems Mother        Review of Systems  Comprehensive ROS reviewed and negative except for what's stated above.  \    OBJECTIVE:  /73 (BP Location: Left arm)   Pulse 110   Temp 98.8 °F be in afib.      SOB/palpitations   -2/2 afib  -supp o2 as needed    afib c RVR  -tele  -dilt gtt, heparin gtt  -echo  -cards following  -aggravted by etoh  -Had prior DCCV 3 years ago, hasn't taken meds since  -asa, prev on xarelto    # Etoh  -Pt reports h mcg     Date Action Dose Route User    1/5/2020 0059 Given 250 mcg Oral Naomi Perez, RN      diltiazem 100mg/100ml in NaCl (CARDIZEM) 1 mg/mL premix/add-vantage     Date Action Dose Route User    1/5/2020 1340 New Bag 10 mg/hr Intravenous Janene Vigil Route User    1/5/2020 0508 Given 25 mg Oral Jimi Wilson RN      multivitamin (ADULT) tab 1 tablet     Date Action Dose Route User    1/5/2020 7882 Given 1 tablet Oral [place]    1/4/2020 2021 Given 1 tablet Oral Tim Calles RN

## 2020-01-05 NOTE — PLAN OF CARE
Patient with A.fib on telemetry with HR 90's-100's at rest, increased up to 140's with activity, asymptomatic. Diltiazem gtt infusing at 10cc/hr. Heparin gtt infusing at 12cc/hr, therapeutic, next PTT tomorrow AM.  PO diltiazem started.     CIWA scores 1- indicated  - Evaluate effectiveness of antiarrhythmic and heart rate control medications as ordered  - Initiate emergency measures for life threatening arrhythmias  - Monitor electrolytes and administer replacement therapy as ordered  Outcome: Not Progress

## 2020-01-05 NOTE — PROGRESS NOTES
Patient saturating 85% on Ra while asleep with audible snoring. Patient denies use of home CPAP.  2 L O2 applied to keep saturations above 90%. Will monitor.

## 2020-01-05 NOTE — PLAN OF CARE
Pt A&O Room Air  Resting in bed  Denies pain at this time  Afib on Tele  Meds given per Mar  Cardizem drip  Heparin drip at 12/hr, ptt redraw 1/6  PIV infusing . 9 @75  CIWA Q2  Last drink was Thursday.    Ativan given for anxiety and headache  Seizure preca measures for life threatening arrhythmias  - Monitor electrolytes and administer replacement therapy as ordered  Outcome: Progressing     Problem: SAFETY ADULT - FALL  Goal: Free from fall injury  Description  INTERVENTIONS:  - Assess pt frequently for phy

## 2020-01-05 NOTE — PLAN OF CARE
Assumed care of patient at 1. Monitor on tele-Afib,  on RA. Cardizem gtt at 20mg/hr, heparin gtt per protocol. IVF infusing.  Pt stated he feels like he is going through ETOH withdrawal. Pt c/o nausea/internal anxiety/tension/band around head/feeling

## 2020-01-05 NOTE — H&P
General Medicine H&P     Patient presents with:  Chest Pain Angina       PCP: Caleb Hill MD    History of Present Illness: Patient is a 53year old male with PMH including but not limited to afib, HTN, HL, who p/t [facility] ED c SOB/palpitations and foun BMI 57.78 kg/m²     Gen: NAD, A+O x 3, obese  Neck: supple, no LAD  CV: irreg, tachy, +s1/s2, no murmors  Lungs: CTAB, no wheezes  Abd: s/nt/nd, +bs  LE: no c/e/e  Neuro: CN 2-12 intact, no focal deficits      LABS:   Lab Results   Component Value Date folate, MVI    # AYANNA, obesity  -protocol  -encourage wt loss    # Depression/anxiety  -SSRI    # HL  -statin    # BPH  -alfuzosin    # Neuropathy  -gabapentin      Outpatient records or previous hospital records reviewed.  D/w Dr. Corky Whatley and RN Shravan Bansal    DM

## 2020-01-06 LAB — APTT PPP: 44.2 SECONDS (ref 25.4–36.1)

## 2020-01-06 PROCEDURE — 99255 IP/OBS CONSLTJ NEW/EST HI 80: CPT | Performed by: INTERNAL MEDICINE

## 2020-01-06 PROCEDURE — 85730 THROMBOPLASTIN TIME PARTIAL: CPT | Performed by: INTERNAL MEDICINE

## 2020-01-06 RX ORDER — DILTIAZEM HYDROCHLORIDE 240 MG/1
240 CAPSULE, COATED, EXTENDED RELEASE ORAL DAILY
Status: DISCONTINUED | OUTPATIENT
Start: 2020-01-06 | End: 2020-01-07

## 2020-01-06 RX ORDER — METOPROLOL SUCCINATE 50 MG/1
50 TABLET, EXTENDED RELEASE ORAL
Status: DISCONTINUED | OUTPATIENT
Start: 2020-01-07 | End: 2020-01-06

## 2020-01-06 NOTE — PROGRESS NOTES
[facility] Hospitalist Progress Note     PCP: Nicole Michael MD    Chief Complaint: follow-up    Overnight/Interim Events:      SUBJECTIVE:  Laying in bed, napping, trying to sleep. No cp/sob.  107/56, 105    OBJECTIVE:  Temp:  [97.3 °F (36.3 °C)-98.4 °F (3 diltiazem 5 mg/hr (01/06/20 1249)   • sodium chloride 75 mL/hr at 01/06/20 0718     acetaminophen, LORazepam **OR** LORazepam, traZODone HCl       Assessment/Plan:     53year old male with PMH including but not limited to afib, HTN, HL, who p/t [facility] ED c SO

## 2020-01-06 NOTE — PROGRESS NOTES
Per cardiology orders Heparin gtt discontinued and first dose eliquis given. cardizem gtt rate decreased from 10mg/hr to 5mg/hr and new dose cardizem po given. Will continue to monitor on tele.

## 2020-01-06 NOTE — CONSULTS
[facility]  Electrophysiology Initial Consult Note      Anali Moralez Patient Status:  Inpatient    1965 MRN [id number]   [facility] 3NE-A Attending Katie Betancourt MD   [facility] # 2 PCP Thuy Luz, 100mg/100ml in NaCl (CARDIZEM) 1 mg/mL premix/add-vantage, 2.5-20 mg/hr, Intravenous, Continuous  •  heparin (PORCINE) drip 13572mevfn/250mL infusion CONTINUOUS, 200-3,000 Units/hr, Intravenous, Continuous  •  digoxin (LANOXIN) tab 250 mcg, 250 mcg, Oral, and dry.        Labs:     Recent Labs   Lab 01/04/20  0846   *   BUN 18   CREATSERUM 0.75   GFRAA 119   GFRNAA 103   CA 8.6      K 4.9      CO2 21.0         Recent Labs   Lab 01/04/20  0846   RBC 4.90   HGB 14.9   HCT 43.7   MCV 89.2   MC ([address]     Sensorineural hearing loss (SNHL) of both ears     Foraminal stenosis of lumbosacral region     Atrial fibrillation with rapid ventricular response (HCC)          Skylar Keita MD  EP  [facility]

## 2020-01-06 NOTE — PAYOR COMM NOTE
--------------  ADMISSION REVIEW     Payor: Saeed Salazar #:  [id number]  Authorization Number: [id number]    Admit date: 1/4/20  Admit time: 1331       Admitting Physician: William Barlow MD  Attending Physici Abnormal; Notable for the following components:    PTT 23.1 (*)      EKG    Rate, intervals and axes as noted on EKG Report. Rate: 166  Rhythm: Atrial Fibrillation  Reading: A. fib with RVR. No evidence of acute ischemia.          Xr Chest Ap Portable HL  -statin    # BPH  -alfuzosin    # Neuropathy  -gabapentin          CARDIOLOGY CONSULT    Reason for Consultation:  AF    History of Present Illness:  Zeke Godoy is a a(n) 53year old male with PAF, DCCV 3 years ago, who has not followed with doct PAF  Remains in AF today  Po digoxin load, IV Cardizem gtt  Per review of Dr. Gabriella Gray EP notes 5/2019, patient previously on Tikosyn  Anticoagulation: Heparin gtt, previously on Xarelto     EtOH  Recommend cessation  Watch for signs of withdrawal

## 2020-01-06 NOTE — PLAN OF CARE
A&Ox4, anxious at times. CIWA Q2H, scoring 0-2. On room air, lungs clear and diminished. Abdomen soft, round and nontender, BS active. Tolerating diet. Denies pain or discomfort.  A-fib on tele- Heparin gtt infusing at 12 mL/hr and cardizem gtt infusing at heart rate control medications as ordered  - Initiate emergency measures for life threatening arrhythmias  - Monitor electrolytes and administer replacement therapy as ordered  Outcome: Progressing     Problem: SAFETY ADULT - FALL  Goal: Free from fall inj

## 2020-01-06 NOTE — PLAN OF CARE
Patient here with chest pain, A. Fib RVR. Now on cardizem gtt at 10mg/hr  Heparin gtt increased from 12ml/hr to 14ml/hr per protocol. ETOH detox compelte. Ativan given this am per patient request for anxiety.  Patient states he did not sleep well last ni Continuous cardiac monitoring, monitor vital signs, obtain 12 lead EKG if indicated  - Evaluate effectiveness of antiarrhythmic and heart rate control medications as ordered  - Initiate emergency measures for life threatening arrhythmias  - Monitor electro

## 2020-01-07 VITALS
SYSTOLIC BLOOD PRESSURE: 105 MMHG | DIASTOLIC BLOOD PRESSURE: 59 MMHG | HEIGHT: 70 IN | BODY MASS INDEX: 45.1 KG/M2 | WEIGHT: 315 LBS | TEMPERATURE: 98 F | HEART RATE: 125 BPM | OXYGEN SATURATION: 96 % | RESPIRATION RATE: 18 BRPM

## 2020-01-07 LAB
ANION GAP SERPL CALC-SCNC: 6 MMOL/L (ref 0–18)
ATRIAL RATE: 153 BPM
BUN BLD-MCNC: 13 MG/DL (ref 7–18)
BUN/CREAT SERPL: 19.4 (ref 10–20)
CALCIUM BLD-MCNC: 8.1 MG/DL (ref 8.5–10.1)
CHLORIDE SERPL-SCNC: 112 MMOL/L (ref 98–112)
CO2 SERPL-SCNC: 23 MMOL/L (ref 21–32)
CREAT BLD-MCNC: 0.67 MG/DL (ref 0.7–1.3)
DEPRECATED RDW RBC AUTO: 47 FL (ref 35.1–46.3)
ERYTHROCYTE [DISTWIDTH] IN BLOOD BY AUTOMATED COUNT: 13.8 % (ref 11–15)
GLUCOSE BLD-MCNC: 102 MG/DL (ref 70–99)
HAV IGM SER QL: 1.7 MG/DL (ref 1.6–2.6)
HCT VFR BLD AUTO: 39.2 % (ref 39–53)
HGB BLD-MCNC: 12.9 G/DL (ref 13–17.5)
MCH RBC QN AUTO: 30.6 PG (ref 26–34)
MCHC RBC AUTO-ENTMCNC: 32.9 G/DL (ref 31–37)
MCV RBC AUTO: 92.9 FL (ref 80–100)
OSMOLALITY SERPL CALC.SUM OF ELEC: 292 MOSM/KG (ref 275–295)
PLATELET # BLD AUTO: 163 10(3)UL (ref 150–450)
POTASSIUM SERPL-SCNC: 3.9 MMOL/L (ref 3.5–5.1)
Q-T INTERVAL: 396 MS
QRS DURATION: 98 MS
QTC CALCULATION (BEZET): 430 MS
R AXIS: -58 DEGREES
RBC # BLD AUTO: 4.22 X10(6)UL (ref 4.3–5.7)
SODIUM SERPL-SCNC: 141 MMOL/L (ref 136–145)
T AXIS: 49 DEGREES
VENTRICULAR RATE: 71 BPM
WBC # BLD AUTO: 8.3 X10(3) UL (ref 4–11)

## 2020-01-07 PROCEDURE — 80048 BASIC METABOLIC PNL TOTAL CA: CPT | Performed by: INTERNAL MEDICINE

## 2020-01-07 PROCEDURE — 93010 ELECTROCARDIOGRAM REPORT: CPT | Performed by: INTERNAL MEDICINE

## 2020-01-07 PROCEDURE — 99232 SBSQ HOSP IP/OBS MODERATE 35: CPT | Performed by: INTERNAL MEDICINE

## 2020-01-07 PROCEDURE — 93005 ELECTROCARDIOGRAM TRACING: CPT

## 2020-01-07 PROCEDURE — 85027 COMPLETE CBC AUTOMATED: CPT | Performed by: INTERNAL MEDICINE

## 2020-01-07 PROCEDURE — 83735 ASSAY OF MAGNESIUM: CPT | Performed by: INTERNAL MEDICINE

## 2020-01-07 RX ORDER — MAGNESIUM OXIDE 400 MG (241.3 MG MAGNESIUM) TABLET
400 TABLET ONCE
Status: COMPLETED | OUTPATIENT
Start: 2020-01-07 | End: 2020-01-07

## 2020-01-07 RX ORDER — DILTIAZEM HYDROCHLORIDE 240 MG/1
240 CAPSULE, EXTENDED RELEASE ORAL DAILY
Qty: 30 CAPSULE | Refills: 2 | Status: SHIPPED | OUTPATIENT
Start: 2020-01-07 | End: 2020-02-05

## 2020-01-07 RX ORDER — METOPROLOL SUCCINATE 100 MG/1
100 TABLET, EXTENDED RELEASE ORAL
Qty: 60 TABLET | Refills: 3 | Status: SHIPPED | OUTPATIENT
Start: 2020-01-07 | End: 2020-02-05

## 2020-01-07 RX ORDER — METOPROLOL SUCCINATE 50 MG/1
100 TABLET, EXTENDED RELEASE ORAL
Status: DISCONTINUED | OUTPATIENT
Start: 2020-01-07 | End: 2020-01-07

## 2020-01-07 RX ORDER — METOPROLOL SUCCINATE 25 MG/1
75 TABLET, EXTENDED RELEASE ORAL
Qty: 60 TABLET | Refills: 0 | Status: SHIPPED | OUTPATIENT
Start: 2020-01-07 | End: 2020-01-07

## 2020-01-07 NOTE — PROGRESS NOTES
Psych liaison unavailable to see pt while here. Spoke with Dr. Jay Sweet who was okay with pt d/c with the list of resources from the [address] [place] [address] which I will go over with him at discharge and the pt can follow up outpatient as he is willing.

## 2020-01-07 NOTE — PAYOR COMM NOTE
--------------  CONTINUED STAY REVIEW    Payor: Saeed Salazar #:  [id number]  Authorization Number: [id number]    Admit date: 1/4/20  Admit time: 1331    Admitting Physician: Jonah Oropeza MD  Attending Physic

## 2020-01-07 NOTE — PLAN OF CARE
A&Ox4. Room air. Afib d/c cardizem drip . Rates 80s-90s. IVF infusing. No longer CIWA protocol. Complaining of mild \"chest aching\" this am which he reports is improving.  MD notified during rounds, EKG reviewed. No new orders.    Per cardiology if HR r arrhythmias  - Monitor electrolytes and administer replacement therapy as ordered  Outcome: Progressing     Problem: SAFETY ADULT - FALL  Goal: Free from fall injury  Description  INTERVENTIONS:  - Assess pt frequently for physical needs  - Identify cognit

## 2020-01-07 NOTE — DISCHARGE SUMMARY
[facility] Internal Medicine Discharge Summary   Patient ID:  Reji Abdul  [id number]  53year old  2/28/1965    Admit date: 1/4/2020    Discharge date and time: 1/7/2020     Attending Physician: Flip Wilkins MD     Primary Care Physician: Haydee Tillman Anxiety  -prn ativan     # AYANNA, obesity  -protocol  -encourage wt loss  -OP ayanna eval     # Depression/anxiety  -SSRI     # HL  -statin     # BPH  -alfuzosin     # Neuropathy  -gabapentin     Dispo: med/psych    Day of discharge Exam    01/07/20  0339   BP: medications    DIGOX 0.25 MG Tabs  Generic drug:  digoxin     dofetilide 500 MCG Caps  Commonly known as:  TIKOSYN     Metoprolol Tartrate 100 MG Tabs  Commonly known as:  LOPRESSOR     traMADol HCl  MG Tb24  Commonly known as:  Cecille Repress

## 2020-01-07 NOTE — PROGRESS NOTES
[facility]  Cardiology Progress Note    Kiley Jolley Patient Status:  Inpatient    1965 MRN [id number]   [facility] 3NE-A Attending Yudy Acosta MD   Hosp Day # 3 PCP Gus Harper MD     Subjective:  Feeling bet escitalopram  10 mg Oral Daily   • gabapentin  600 mg Oral TID   • Thiamine HCl  100 mg Oral Daily   • folic acid  1 mg Oral Daily   • multivitamin  1 tablet Oral Daily     • diltiazem 5 mg/hr (01/07/20 0745)   • sodium chloride 75 mL/hr at 01/06/20 0181

## 2020-01-07 NOTE — PLAN OF CARE
A&Ox4. Room air. Afib on cardizem drip @ 10mL. Rates 70-90. Ativan PRN for sleep. IVF infusing. No longer CIWA protocol. Complaining of mild \"chest aching\". No further needs at this time. Staff will continue to monitor.      Pt complaining of \"chest achi ordered  - Initiate emergency measures for life threatening arrhythmias  - Monitor electrolytes and administer replacement therapy as ordered  Outcome: Progressing     Problem: SAFETY ADULT - FALL  Goal: Free from fall injury  Description  INTERVENTIONS:

## 2020-01-08 NOTE — PAYOR COMM NOTE
--------------  DISCHARGE REVIEW    Payor: Saeed Salazar #:  [id number]  Authorization Number: [id number]    Admit date: 1/4/20  Admit time:  7109  Discharge Date: 1/7/2020  6:28 PM     Admitting Physician: Chester Nur

## 2020-01-08 NOTE — PROGRESS NOTES
NURSING DISCHARGE NOTE    Discharged Home via Ambulatory. Accompanied by RN  Belongings Taken by patient/family. Reviewed med rec and d/c instructions with pt- verbalized understanding. Removed PIV and continuous monitoring.  Pt left the unit in sta

## 2020-01-14 ENCOUNTER — APPOINTMENT (OUTPATIENT)
Dept: CARDIOLOGY | Age: 55
End: 2020-01-14

## 2020-02-10 ENCOUNTER — TELEPHONE (OUTPATIENT)
Dept: CARDIOLOGY | Age: 55
End: 2020-02-10

## 2020-02-10 ENCOUNTER — APPOINTMENT (OUTPATIENT)
Dept: CARDIOLOGY | Age: 55
End: 2020-02-10

## 2020-02-12 ENCOUNTER — OFFICE VISIT (OUTPATIENT)
Dept: CARDIOLOGY | Age: 55
End: 2020-02-12

## 2020-02-12 ENCOUNTER — TELEPHONE (OUTPATIENT)
Dept: CARDIOLOGY | Age: 55
End: 2020-02-12

## 2020-02-12 VITALS
HEIGHT: 70 IN | DIASTOLIC BLOOD PRESSURE: 76 MMHG | WEIGHT: 315 LBS | SYSTOLIC BLOOD PRESSURE: 128 MMHG | BODY MASS INDEX: 45.1 KG/M2 | HEART RATE: 68 BPM | RESPIRATION RATE: 16 BRPM

## 2020-02-12 DIAGNOSIS — I48.19 OTHER PERSISTENT ATRIAL FIBRILLATION (CMD): Primary | ICD-10-CM

## 2020-02-12 DIAGNOSIS — I10 HYPERTENSION, BENIGN: ICD-10-CM

## 2020-02-12 DIAGNOSIS — E66.01 OBESITY, MORBID, BMI 50 OR HIGHER (CMD): ICD-10-CM

## 2020-02-12 PROCEDURE — 3078F DIAST BP <80 MM HG: CPT | Performed by: INTERNAL MEDICINE

## 2020-02-12 PROCEDURE — 99215 OFFICE O/P EST HI 40 MIN: CPT | Performed by: INTERNAL MEDICINE

## 2020-02-12 PROCEDURE — 3074F SYST BP LT 130 MM HG: CPT | Performed by: INTERNAL MEDICINE

## 2020-02-12 SDOH — HEALTH STABILITY: PHYSICAL HEALTH: ON AVERAGE, HOW MANY DAYS PER WEEK DO YOU ENGAGE IN MODERATE TO STRENUOUS EXERCISE (LIKE A BRISK WALK)?: 2 DAYS

## 2020-02-12 SDOH — HEALTH STABILITY: MENTAL HEALTH: HOW OFTEN DO YOU HAVE A DRINK CONTAINING ALCOHOL?: NEVER

## 2020-02-12 SDOH — HEALTH STABILITY: PHYSICAL HEALTH: ON AVERAGE, HOW MANY MINUTES DO YOU ENGAGE IN EXERCISE AT THIS LEVEL?: 60 MIN

## 2020-02-12 ASSESSMENT — PATIENT HEALTH QUESTIONNAIRE - PHQ9
2. FEELING DOWN, DEPRESSED OR HOPELESS: NOT AT ALL
1. LITTLE INTEREST OR PLEASURE IN DOING THINGS: NOT AT ALL
SUM OF ALL RESPONSES TO PHQ9 QUESTIONS 1 AND 2: 0
SUM OF ALL RESPONSES TO PHQ9 QUESTIONS 1 AND 2: 0

## 2020-02-13 PROBLEM — E66.01 MORBID OBESITY WITH BMI OF 50.0-59.9, ADULT (HCC): Status: ACTIVE | Noted: 2017-12-09

## 2020-02-13 PROBLEM — I48.91 ATRIAL FIBRILLATION WITH RAPID VENTRICULAR RESPONSE (HCC): Status: RESOLVED | Noted: 2020-01-04 | Resolved: 2020-02-13

## 2020-02-14 ENCOUNTER — PREP FOR CASE (OUTPATIENT)
Dept: CARDIOLOGY | Age: 55
End: 2020-02-14

## 2020-02-14 ENCOUNTER — TELEPHONE (OUTPATIENT)
Dept: CARDIOLOGY | Age: 55
End: 2020-02-14

## 2020-02-14 DIAGNOSIS — I48.19 OTHER PERSISTENT ATRIAL FIBRILLATION (CMD): Primary | ICD-10-CM

## 2020-02-14 RX ORDER — SODIUM CHLORIDE 9 MG/ML
INJECTION, SOLUTION INTRAVENOUS CONTINUOUS
Status: CANCELLED | OUTPATIENT
Start: 2020-02-14

## 2020-02-14 RX ORDER — POTASSIUM CHLORIDE 20 MEQ/1
20 TABLET, EXTENDED RELEASE ORAL PRN
Status: CANCELLED | OUTPATIENT
Start: 2020-02-14

## 2020-02-14 RX ORDER — POTASSIUM CHLORIDE 20 MEQ/1
40 TABLET, EXTENDED RELEASE ORAL PRN
Status: CANCELLED | OUTPATIENT
Start: 2020-02-14

## 2020-02-14 RX ORDER — POTASSIUM CHLORIDE 14.9 MG/ML
40 INJECTION INTRAVENOUS PRN
Status: CANCELLED | OUTPATIENT
Start: 2020-02-14

## 2020-02-14 RX ORDER — DOFETILIDE 0.5 MG/1
500 CAPSULE ORAL EVERY 12 HOURS SCHEDULED
Status: CANCELLED | OUTPATIENT
Start: 2020-02-14

## 2020-02-14 RX ORDER — POTASSIUM CHLORIDE 14.9 MG/ML
20 INJECTION INTRAVENOUS PRN
Status: CANCELLED | OUTPATIENT
Start: 2020-02-14

## 2020-02-14 RX ORDER — 0.9 % SODIUM CHLORIDE 0.9 %
2 VIAL (ML) INJECTION EVERY 12 HOURS SCHEDULED
Status: CANCELLED | OUTPATIENT
Start: 2020-02-14

## 2020-02-27 PROBLEM — F33.42 RECURRENT MAJOR DEPRESSIVE DISORDER, IN FULL REMISSION: Status: ACTIVE | Noted: 2019-08-20

## 2020-02-27 PROBLEM — M25.562 CHRONIC PAIN OF BOTH KNEES: Status: RESOLVED | Noted: 2017-07-18 | Resolved: 2020-02-27

## 2020-02-27 PROBLEM — M25.561 CHRONIC PAIN OF BOTH KNEES: Status: RESOLVED | Noted: 2017-07-18 | Resolved: 2020-02-27

## 2020-02-27 PROBLEM — G89.29 CHRONIC PAIN OF BOTH KNEES: Status: RESOLVED | Noted: 2017-07-18 | Resolved: 2020-02-27

## 2020-02-27 PROBLEM — F33.42 RECURRENT MAJOR DEPRESSIVE DISORDER, IN FULL REMISSION (HCC): Status: ACTIVE | Noted: 2019-08-20

## 2020-02-27 PROBLEM — F41.9 ANXIETY: Status: ACTIVE | Noted: 2020-02-27

## 2020-02-27 PROBLEM — E66.01 MORBID OBESITY WITH BMI OF 60.0-69.9, ADULT (HCC): Status: ACTIVE | Noted: 2017-12-09

## 2020-03-02 ENCOUNTER — TELEPHONE (OUTPATIENT)
Dept: CARDIOLOGY | Age: 55
End: 2020-03-02

## 2020-03-02 ENCOUNTER — HOSPITAL ENCOUNTER (INPATIENT)
Age: 55
LOS: 2 days | Discharge: HOME HEALTH | DRG: 201 | End: 2020-03-04
Attending: INTERNAL MEDICINE | Admitting: INTERNAL MEDICINE

## 2020-03-02 DIAGNOSIS — I48.19 OTHER PERSISTENT ATRIAL FIBRILLATION (CMD): ICD-10-CM

## 2020-03-02 LAB
ATRIAL RATE (BPM): 277
CREAT SERPL-MCNC: 0.69 MG/DL (ref 0.67–1.17)
INR PPP: 1.2
MAGNESIUM SERPL-MCNC: 2.2 MG/DL (ref 1.7–2.4)
POTASSIUM SERPL-SCNC: 4.5 MMOL/L (ref 3.4–5.1)
PROTHROMBIN TIME: 12.2 SEC (ref 9.7–11.8)
QRS-INTERVAL (MSEC): 92
QT-INTERVAL (MSEC): 388
QTC: 419
R AXIS (DEGREES): -55
REPORT TEXT: NORMAL
T AXIS (DEGREES): -14
VENTRICULAR RATE EKG/MIN (BPM): 70

## 2020-03-02 PROCEDURE — 83735 ASSAY OF MAGNESIUM: CPT

## 2020-03-02 PROCEDURE — 85610 PROTHROMBIN TIME: CPT

## 2020-03-02 PROCEDURE — 93005 ELECTROCARDIOGRAM TRACING: CPT | Performed by: NURSE PRACTITIONER

## 2020-03-02 PROCEDURE — 93005 ELECTROCARDIOGRAM TRACING: CPT | Performed by: INTERNAL MEDICINE

## 2020-03-02 PROCEDURE — 10002803 HB RX 637: Performed by: INTERNAL MEDICINE

## 2020-03-02 PROCEDURE — 10004651 HB RX, NO CHARGE ITEM: Performed by: INTERNAL MEDICINE

## 2020-03-02 PROCEDURE — 36415 COLL VENOUS BLD VENIPUNCTURE: CPT

## 2020-03-02 PROCEDURE — 99232 SBSQ HOSP IP/OBS MODERATE 35: CPT | Performed by: INTERNAL MEDICINE

## 2020-03-02 PROCEDURE — 82565 ASSAY OF CREATININE: CPT

## 2020-03-02 PROCEDURE — 10003585 HB ROOM CHARGE INTERMEDIATE CARE

## 2020-03-02 PROCEDURE — 84132 ASSAY OF SERUM POTASSIUM: CPT

## 2020-03-02 PROCEDURE — 99255 IP/OBS CONSLTJ NEW/EST HI 80: CPT | Performed by: INTERNAL MEDICINE

## 2020-03-02 RX ORDER — POTASSIUM CHLORIDE 20 MEQ/1
20 TABLET, EXTENDED RELEASE ORAL PRN
Status: DISCONTINUED | OUTPATIENT
Start: 2020-03-02 | End: 2020-03-04 | Stop reason: HOSPADM

## 2020-03-02 RX ORDER — MAGNESIUM SULFATE 1 G/100ML
1 INJECTION INTRAVENOUS PRN
Status: DISCONTINUED | OUTPATIENT
Start: 2020-03-02 | End: 2020-03-04 | Stop reason: HOSPADM

## 2020-03-02 RX ORDER — ALFUZOSIN HYDROCHLORIDE 10 MG/1
10 TABLET, EXTENDED RELEASE ORAL
Status: DISCONTINUED | OUTPATIENT
Start: 2020-03-02 | End: 2020-03-04 | Stop reason: HOSPADM

## 2020-03-02 RX ORDER — GABAPENTIN 300 MG/1
300 CAPSULE ORAL 3 TIMES DAILY
Status: DISCONTINUED | OUTPATIENT
Start: 2020-03-02 | End: 2020-03-04 | Stop reason: HOSPADM

## 2020-03-02 RX ORDER — ALFUZOSIN HYDROCHLORIDE 10 MG/1
10 TABLET, EXTENDED RELEASE ORAL DAILY PRN
COMMUNITY
End: 2020-05-07

## 2020-03-02 RX ORDER — DOFETILIDE 0.5 MG/1
500 CAPSULE ORAL ONCE
Status: COMPLETED | OUTPATIENT
Start: 2020-03-03 | End: 2020-03-03

## 2020-03-02 RX ORDER — TRAMADOL HYDROCHLORIDE 50 MG/1
50 TABLET ORAL EVERY 6 HOURS PRN
Status: ON HOLD | COMMUNITY
End: 2020-05-11

## 2020-03-02 RX ORDER — ATORVASTATIN CALCIUM 10 MG/1
10 TABLET, FILM COATED ORAL DAILY
Status: DISCONTINUED | OUTPATIENT
Start: 2020-03-02 | End: 2020-03-04 | Stop reason: HOSPADM

## 2020-03-02 RX ORDER — BUMETANIDE 1 MG/1
1 TABLET ORAL 2 TIMES DAILY
Status: DISCONTINUED | OUTPATIENT
Start: 2020-03-02 | End: 2020-03-04 | Stop reason: HOSPADM

## 2020-03-02 RX ORDER — DILTIAZEM HYDROCHLORIDE 240 MG/1
240 CAPSULE, EXTENDED RELEASE ORAL DAILY
Status: DISCONTINUED | OUTPATIENT
Start: 2020-03-02 | End: 2020-03-02

## 2020-03-02 RX ORDER — DOFETILIDE 0.5 MG/1
500 CAPSULE ORAL EVERY 12 HOURS SCHEDULED
Status: DISCONTINUED | OUTPATIENT
Start: 2020-03-02 | End: 2020-03-02 | Stop reason: DRUGHIGH

## 2020-03-02 RX ORDER — 0.9 % SODIUM CHLORIDE 0.9 %
2 VIAL (ML) INJECTION EVERY 12 HOURS SCHEDULED
Status: DISCONTINUED | OUTPATIENT
Start: 2020-03-02 | End: 2020-03-04 | Stop reason: HOSPADM

## 2020-03-02 RX ORDER — POTASSIUM CHLORIDE 20 MEQ/1
40 TABLET, EXTENDED RELEASE ORAL PRN
Status: DISCONTINUED | OUTPATIENT
Start: 2020-03-02 | End: 2020-03-04 | Stop reason: HOSPADM

## 2020-03-02 RX ORDER — SODIUM CHLORIDE 9 MG/ML
INJECTION, SOLUTION INTRAVENOUS CONTINUOUS
Status: DISCONTINUED | OUTPATIENT
Start: 2020-03-02 | End: 2020-03-04 | Stop reason: HOSPADM

## 2020-03-02 RX ORDER — POTASSIUM CHLORIDE 14.9 MG/ML
20 INJECTION INTRAVENOUS PRN
Status: DISCONTINUED | OUTPATIENT
Start: 2020-03-02 | End: 2020-03-04 | Stop reason: HOSPADM

## 2020-03-02 RX ORDER — GABAPENTIN 300 MG/1
300 CAPSULE ORAL 3 TIMES DAILY PRN
COMMUNITY
End: 2020-05-07

## 2020-03-02 RX ORDER — DILTIAZEM HYDROCHLORIDE 240 MG/1
240 CAPSULE, EXTENDED RELEASE ORAL AT BEDTIME
Status: DISCONTINUED | OUTPATIENT
Start: 2020-03-02 | End: 2020-03-04 | Stop reason: HOSPADM

## 2020-03-02 RX ORDER — SPIRONOLACTONE 25 MG/1
25 TABLET ORAL NIGHTLY
Status: ON HOLD | COMMUNITY
End: 2020-05-13 | Stop reason: SDUPTHER

## 2020-03-02 RX ORDER — POTASSIUM CHLORIDE 14.9 G/1000ML
40 INJECTION, SOLUTION INTRAVENOUS PRN
Status: DISCONTINUED | OUTPATIENT
Start: 2020-03-02 | End: 2020-03-04 | Stop reason: HOSPADM

## 2020-03-02 RX ORDER — METOPROLOL SUCCINATE 100 MG/1
100 TABLET, EXTENDED RELEASE ORAL EVERY 12 HOURS SCHEDULED
Status: DISCONTINUED | OUTPATIENT
Start: 2020-03-02 | End: 2020-03-04 | Stop reason: HOSPADM

## 2020-03-02 RX ADMIN — DOFETILIDE 500 MCG: 0.5 CAPSULE ORAL at 15:07

## 2020-03-02 RX ADMIN — METOPROLOL SUCCINATE 100 MG: 100 TABLET, EXTENDED RELEASE ORAL at 22:18

## 2020-03-02 RX ADMIN — GABAPENTIN 300 MG: 300 CAPSULE ORAL at 22:21

## 2020-03-02 RX ADMIN — METOPROLOL SUCCINATE 100 MG: 100 TABLET, EXTENDED RELEASE ORAL at 15:05

## 2020-03-02 RX ADMIN — ASPIRIN 81 MG: 81 TABLET, COATED ORAL at 15:04

## 2020-03-02 RX ADMIN — SODIUM CHLORIDE, PRESERVATIVE FREE 2 ML: 5 INJECTION INTRAVENOUS at 15:06

## 2020-03-02 RX ADMIN — BUMETANIDE 1 MG: 1 TABLET ORAL at 17:17

## 2020-03-02 RX ADMIN — SODIUM CHLORIDE, PRESERVATIVE FREE 2 ML: 5 INJECTION INTRAVENOUS at 22:21

## 2020-03-02 RX ADMIN — DILTIAZEM HYDROCHLORIDE 240 MG: 240 CAPSULE, EXTENDED RELEASE ORAL at 22:18

## 2020-03-02 RX ADMIN — ATORVASTATIN CALCIUM 10 MG: 10 TABLET, FILM COATED ORAL at 15:04

## 2020-03-02 RX ADMIN — GABAPENTIN 300 MG: 300 CAPSULE ORAL at 15:12

## 2020-03-02 RX ADMIN — RIVAROXABAN 20 MG: 20 TABLET, FILM COATED ORAL at 17:17

## 2020-03-02 ASSESSMENT — PATIENT HEALTH QUESTIONNAIRE - PHQ9
1. LITTLE INTEREST OR PLEASURE IN DOING THINGS: NOT AT ALL
IS PATIENT ABLE TO COMPLETE PHQ2 OR PHQ9: YES
SUM OF ALL RESPONSES TO PHQ9 QUESTIONS 1 AND 2: 0
2. FEELING DOWN, DEPRESSED OR HOPELESS: NOT AT ALL
SUM OF ALL RESPONSES TO PHQ9 QUESTIONS 1 AND 2: 0

## 2020-03-02 ASSESSMENT — COLUMBIA-SUICIDE SEVERITY RATING SCALE - C-SSRS
2. HAVE YOU ACTUALLY HAD ANY THOUGHTS OF KILLING YOURSELF?: NO
IS THE PATIENT ABLE TO COMPLETE C-SSRS: YES
1. WITHIN THE PAST MONTH, HAVE YOU WISHED YOU WERE DEAD OR WISHED YOU COULD GO TO SLEEP AND NOT WAKE UP?: NO

## 2020-03-02 ASSESSMENT — ACTIVITIES OF DAILY LIVING (ADL)
CONTINENCE: INDEPENDENT
TRANSFERRING: INDEPENDENT
ADL_SCORE: 24
CHRONIC_PAIN_PRESENT: NO
FEEDING YOURSELF: INDEPENDENT
RECENT_DECLINE_ADL: NO
DRESSING YOURSELF: INDEPENDENT
ADL_BEFORE_ADMISSION: INDEPENDENT
BATHING: INDEPENDENT
TOILETING: INDEPENDENT

## 2020-03-02 ASSESSMENT — COGNITIVE AND FUNCTIONAL STATUS - GENERAL
ARE YOU BLIND OR DO YOU HAVE SERIOUS DIFFICULTY SEEING, EVEN WHEN WEARING GLASSES: NO
DO YOU HAVE DIFFICULTY DRESSING OR BATHING: NO
BECAUSE OF A PHYSICAL, MENTAL, OR EMOTIONAL CONDITION, DO YOU HAVE SERIOUS DIFFICULTY CONCENTRATING, REMEMBERING OR MAKING DECISIONS: NO
BECAUSE OF A PHYSICAL, MENTAL, OR EMOTIONAL CONDITION, DO YOU HAVE DIFFICULTY DOING ERRANDS ALONE: NO
DO YOU HAVE SERIOUS DIFFICULTY WALKING OR CLIMBING STAIRS: NO
ARE YOU DEAF OR DO YOU HAVE SERIOUS DIFFICULTY  HEARING: NO

## 2020-03-02 ASSESSMENT — LIFESTYLE VARIABLES
AUDIT-C TOTAL SCORE: 0
HOW OFTEN DO YOU HAVE 6 OR MORE DRINKS ON ONE OCCASION: NEVER
ALCOHOL_USE_STATUS: NO OR LOW RISK WITH VALIDATED TOOL
HOW OFTEN DO YOU HAVE A DRINK CONTAINING ALCOHOL: NEVER
HOW MANY STANDARD DRINKS CONTAINING ALCOHOL DO YOU HAVE ON A TYPICAL DAY: 0,1 OR 2

## 2020-03-02 ASSESSMENT — PAIN SCALES - GENERAL
PAINLEVEL_OUTOF10: 0
PAINLEVEL_OUTOF10: 0

## 2020-03-03 ENCOUNTER — APPOINTMENT (OUTPATIENT)
Dept: INTERPRETER SERVICES | Age: 55
End: 2020-03-03

## 2020-03-03 ENCOUNTER — ANESTHESIA (OUTPATIENT)
Dept: CARDIOLOGY | Age: 55
DRG: 201 | End: 2020-03-03

## 2020-03-03 ENCOUNTER — APPOINTMENT (OUTPATIENT)
Dept: CARDIOLOGY | Age: 55
DRG: 201 | End: 2020-03-03
Attending: NURSE PRACTITIONER

## 2020-03-03 ENCOUNTER — APPOINTMENT (OUTPATIENT)
Dept: CARDIOLOGY | Age: 55
DRG: 201 | End: 2020-03-03
Attending: INTERNAL MEDICINE

## 2020-03-03 ENCOUNTER — ANESTHESIA EVENT (OUTPATIENT)
Dept: CARDIOLOGY | Age: 55
DRG: 201 | End: 2020-03-03

## 2020-03-03 PROBLEM — E78.2 MIXED HYPERLIPIDEMIA: Status: ACTIVE | Noted: 2020-03-03

## 2020-03-03 LAB
ALBUMIN SERPL-MCNC: 3.5 G/DL (ref 3.6–5.1)
ALBUMIN/GLOB SERPL: 0.7 {RATIO} (ref 1–2.4)
ALP SERPL-CCNC: 98 UNITS/L (ref 45–117)
ALT SERPL-CCNC: 24 UNITS/L
ANION GAP SERPL CALC-SCNC: 8 MMOL/L (ref 10–20)
AST SERPL-CCNC: 13 UNITS/L
ATRIAL RATE (BPM): 104
ATRIAL RATE (BPM): 147
ATRIAL RATE (BPM): 68
BILIRUB SERPL-MCNC: 0.7 MG/DL (ref 0.2–1)
BUN SERPL-MCNC: 15 MG/DL (ref 6–20)
BUN/CREAT SERPL: 21 (ref 7–25)
CALCIUM SERPL-MCNC: 8.9 MG/DL (ref 8.4–10.2)
CHLORIDE SERPL-SCNC: 109 MMOL/L (ref 98–107)
CHOLEST SERPL-MCNC: 129 MG/DL
CHOLEST/HDLC SERPL: 3.1 {RATIO}
CO2 SERPL-SCNC: 25 MMOL/L (ref 21–32)
CREAT SERPL-MCNC: 0.72 MG/DL (ref 0.67–1.17)
CREAT SERPL-MCNC: 0.78 MG/DL (ref 0.67–1.17)
GLOBULIN SER-MCNC: 4.7 G/DL (ref 2–4)
GLUCOSE SERPL-MCNC: 100 MG/DL (ref 65–99)
HBA1C MFR BLD: 6.4 % (ref 4.5–5.6)
HDLC SERPL-MCNC: 42 MG/DL
LDLC SERPL CALC-MCNC: 62 MG/DL
MAGNESIUM SERPL-MCNC: 2.1 MG/DL (ref 1.7–2.4)
NONHDLC SERPL-MCNC: 87 MG/DL
NT-PROBNP SERPL-MCNC: 481 PG/ML
P AXIS (DEGREES): 46
POTASSIUM SERPL-SCNC: 3.9 MMOL/L (ref 3.4–5.1)
POTASSIUM SERPL-SCNC: 4.2 MMOL/L (ref 3.4–5.1)
PR-INTERVAL (MSEC): 154
PROT SERPL-MCNC: 8.2 G/DL (ref 6.4–8.2)
QRS-INTERVAL (MSEC): 100
QRS-INTERVAL (MSEC): 90
QRS-INTERVAL (MSEC): 94
QT-INTERVAL (MSEC): 410
QT-INTERVAL (MSEC): 422
QT-INTERVAL (MSEC): 458
QTC: 481
QTC: 485
QTC: 487
R AXIS (DEGREES): -128
R AXIS (DEGREES): -33
R AXIS (DEGREES): -66
REPORT TEXT: NORMAL
SODIUM SERPL-SCNC: 138 MMOL/L (ref 135–145)
T AXIS (DEGREES): -11
T AXIS (DEGREES): -23
T AXIS (DEGREES): -28
TRIGL SERPL-MCNC: 127 MG/DL
VENTRICULAR RATE EKG/MIN (BPM): 68
VENTRICULAR RATE EKG/MIN (BPM): 78
VENTRICULAR RATE EKG/MIN (BPM): 84

## 2020-03-03 PROCEDURE — 10004651 HB RX, NO CHARGE ITEM: Performed by: INTERNAL MEDICINE

## 2020-03-03 PROCEDURE — 84132 ASSAY OF SERUM POTASSIUM: CPT

## 2020-03-03 PROCEDURE — 93005 ELECTROCARDIOGRAM TRACING: CPT | Performed by: INTERNAL MEDICINE

## 2020-03-03 PROCEDURE — 10002803 HB RX 637: Performed by: INTERNAL MEDICINE

## 2020-03-03 PROCEDURE — 10003585 HB ROOM CHARGE INTERMEDIATE CARE

## 2020-03-03 PROCEDURE — 13000001 HB PHASE II RECOVERY EA 30 MINUTES

## 2020-03-03 PROCEDURE — 10002803 HB RX 637: Performed by: NURSE PRACTITIONER

## 2020-03-03 PROCEDURE — 92960 CARDIOVERSION ELECTRIC EXT: CPT

## 2020-03-03 PROCEDURE — 83036 HEMOGLOBIN GLYCOSYLATED A1C: CPT

## 2020-03-03 PROCEDURE — 5A2204Z RESTORATION OF CARDIAC RHYTHM, SINGLE: ICD-10-PCS | Performed by: INTERNAL MEDICINE

## 2020-03-03 PROCEDURE — 80061 LIPID PANEL: CPT

## 2020-03-03 PROCEDURE — 36415 COLL VENOUS BLD VENIPUNCTURE: CPT

## 2020-03-03 PROCEDURE — 10002800 HB RX 250 W HCPCS: Performed by: ANESTHESIOLOGY

## 2020-03-03 PROCEDURE — 82565 ASSAY OF CREATININE: CPT

## 2020-03-03 PROCEDURE — 10004281 HB COUNTER-STAFF TIME PER 15 MIN

## 2020-03-03 PROCEDURE — 83735 ASSAY OF MAGNESIUM: CPT

## 2020-03-03 PROCEDURE — 10002801 HB RX 250 W/O HCPCS: Performed by: ANESTHESIOLOGY

## 2020-03-03 PROCEDURE — 10002807 HB RX 258: Performed by: INTERNAL MEDICINE

## 2020-03-03 PROCEDURE — 99232 SBSQ HOSP IP/OBS MODERATE 35: CPT | Performed by: INTERNAL MEDICINE

## 2020-03-03 PROCEDURE — 83880 ASSAY OF NATRIURETIC PEPTIDE: CPT

## 2020-03-03 PROCEDURE — 80053 COMPREHEN METABOLIC PANEL: CPT

## 2020-03-03 PROCEDURE — 92960 CARDIOVERSION ELECTRIC EXT: CPT | Performed by: INTERNAL MEDICINE

## 2020-03-03 PROCEDURE — 13000008 HB ANESTHESIA MAC OUTSIDE OR

## 2020-03-03 RX ORDER — ONDANSETRON 2 MG/ML
4 INJECTION INTRAMUSCULAR; INTRAVENOUS ONCE
Status: DISCONTINUED | OUTPATIENT
Start: 2020-03-03 | End: 2020-03-04 | Stop reason: HOSPADM

## 2020-03-03 RX ORDER — HYDRALAZINE HYDROCHLORIDE 20 MG/ML
5 INJECTION INTRAMUSCULAR; INTRAVENOUS EVERY 10 MIN PRN
Status: DISCONTINUED | OUTPATIENT
Start: 2020-03-03 | End: 2020-03-04 | Stop reason: HOSPADM

## 2020-03-03 RX ORDER — PROPOFOL 10 MG/ML
INJECTION, EMULSION INTRAVENOUS PRN
Status: DISCONTINUED | OUTPATIENT
Start: 2020-03-03 | End: 2020-03-03

## 2020-03-03 RX ORDER — DOFETILIDE 0.5 MG/1
500 CAPSULE ORAL EVERY 12 HOURS SCHEDULED
Status: DISCONTINUED | OUTPATIENT
Start: 2020-03-03 | End: 2020-03-04 | Stop reason: HOSPADM

## 2020-03-03 RX ORDER — ACETAMINOPHEN 325 MG/1
650 TABLET ORAL EVERY 4 HOURS PRN
Status: DISCONTINUED | OUTPATIENT
Start: 2020-03-03 | End: 2020-03-04 | Stop reason: HOSPADM

## 2020-03-03 RX ORDER — PROCHLORPERAZINE EDISYLATE 5 MG/ML
5 INJECTION INTRAMUSCULAR; INTRAVENOUS EVERY 4 HOURS PRN
Status: DISCONTINUED | OUTPATIENT
Start: 2020-03-03 | End: 2020-03-03

## 2020-03-03 RX ORDER — SODIUM CHLORIDE, SODIUM LACTATE, POTASSIUM CHLORIDE, CALCIUM CHLORIDE 600; 310; 30; 20 MG/100ML; MG/100ML; MG/100ML; MG/100ML
INJECTION, SOLUTION INTRAVENOUS CONTINUOUS
Status: DISCONTINUED | OUTPATIENT
Start: 2020-03-03 | End: 2020-03-04 | Stop reason: HOSPADM

## 2020-03-03 RX ORDER — LIDOCAINE HYDROCHLORIDE 10 MG/ML
INJECTION, SOLUTION INFILTRATION; PERINEURAL PRN
Status: DISCONTINUED | OUTPATIENT
Start: 2020-03-03 | End: 2020-03-03

## 2020-03-03 RX ORDER — ALBUTEROL SULFATE 2.5 MG/3ML
5 SOLUTION RESPIRATORY (INHALATION) ONCE
Status: DISCONTINUED | OUTPATIENT
Start: 2020-03-03 | End: 2020-03-04

## 2020-03-03 RX ORDER — METHOHEXITAL IN WATER/PF 100MG/10ML
1 SYRINGE (ML) INTRAVENOUS ONCE
Status: DISCONTINUED | OUTPATIENT
Start: 2020-03-03 | End: 2020-03-04 | Stop reason: HOSPADM

## 2020-03-03 RX ADMIN — GABAPENTIN 300 MG: 300 CAPSULE ORAL at 13:35

## 2020-03-03 RX ADMIN — SODIUM CHLORIDE, PRESERVATIVE FREE 2 ML: 5 INJECTION INTRAVENOUS at 09:08

## 2020-03-03 RX ADMIN — DOFETILIDE 500 MCG: 0.5 CAPSULE ORAL at 00:01

## 2020-03-03 RX ADMIN — BUMETANIDE 1 MG: 1 TABLET ORAL at 16:36

## 2020-03-03 RX ADMIN — METOPROLOL SUCCINATE 100 MG: 100 TABLET, EXTENDED RELEASE ORAL at 09:08

## 2020-03-03 RX ADMIN — DOFETILIDE 500 MCG: 0.5 CAPSULE ORAL at 21:02

## 2020-03-03 RX ADMIN — PROPOFOL 100 MG: 10 INJECTION, EMULSION INTRAVENOUS at 10:25

## 2020-03-03 RX ADMIN — BUMETANIDE 1 MG: 1 TABLET ORAL at 09:07

## 2020-03-03 RX ADMIN — SODIUM CHLORIDE, PRESERVATIVE FREE 2 ML: 5 INJECTION INTRAVENOUS at 21:03

## 2020-03-03 RX ADMIN — LIDOCAINE HYDROCHLORIDE 5 ML: 10 INJECTION, SOLUTION INFILTRATION; PERINEURAL at 10:25

## 2020-03-03 RX ADMIN — GABAPENTIN 300 MG: 300 CAPSULE ORAL at 21:13

## 2020-03-03 RX ADMIN — DOFETILIDE 500 MCG: 0.5 CAPSULE ORAL at 09:07

## 2020-03-03 RX ADMIN — METOPROLOL SUCCINATE 100 MG: 100 TABLET, EXTENDED RELEASE ORAL at 21:01

## 2020-03-03 RX ADMIN — DILTIAZEM HYDROCHLORIDE 240 MG: 240 CAPSULE, EXTENDED RELEASE ORAL at 21:02

## 2020-03-03 RX ADMIN — GABAPENTIN 300 MG: 300 CAPSULE ORAL at 09:10

## 2020-03-03 RX ADMIN — SODIUM CHLORIDE: 0.9 INJECTION, SOLUTION INTRAVENOUS at 00:05

## 2020-03-03 RX ADMIN — ATORVASTATIN CALCIUM 10 MG: 10 TABLET, FILM COATED ORAL at 09:07

## 2020-03-03 RX ADMIN — ASPIRIN 81 MG: 81 TABLET, COATED ORAL at 09:06

## 2020-03-03 ASSESSMENT — PAIN SCALES - GENERAL
PAINLEVEL_OUTOF10: 0
PAINLEVEL_OUTOF10: 0

## 2020-03-04 ENCOUNTER — APPOINTMENT (OUTPATIENT)
Dept: INTERPRETER SERVICES | Age: 55
End: 2020-03-04

## 2020-03-04 VITALS
HEIGHT: 70 IN | DIASTOLIC BLOOD PRESSURE: 78 MMHG | TEMPERATURE: 97.7 F | OXYGEN SATURATION: 96 % | WEIGHT: 315 LBS | SYSTOLIC BLOOD PRESSURE: 141 MMHG | RESPIRATION RATE: 16 BRPM | BODY MASS INDEX: 45.1 KG/M2 | HEART RATE: 72 BPM

## 2020-03-04 LAB
ATRIAL RATE (BPM): 71
ATRIAL RATE (BPM): 74
CREAT SERPL-MCNC: 0.8 MG/DL (ref 0.67–1.17)
MAGNESIUM SERPL-MCNC: 2.1 MG/DL (ref 1.7–2.4)
P AXIS (DEGREES): 46
P AXIS (DEGREES): 51
POTASSIUM SERPL-SCNC: 4.3 MMOL/L (ref 3.4–5.1)
PR-INTERVAL (MSEC): 152
PR-INTERVAL (MSEC): 152
QRS-INTERVAL (MSEC): 88
QRS-INTERVAL (MSEC): 90
QT-INTERVAL (MSEC): 432
QT-INTERVAL (MSEC): 436
QTC: 474
QTC: 479
R AXIS (DEGREES): -55
R AXIS (DEGREES): -74
REPORT TEXT: NORMAL
REPORT TEXT: NORMAL
T AXIS (DEGREES): -39
T AXIS (DEGREES): -4
VENTRICULAR RATE EKG/MIN (BPM): 71
VENTRICULAR RATE EKG/MIN (BPM): 74

## 2020-03-04 PROCEDURE — 84132 ASSAY OF SERUM POTASSIUM: CPT

## 2020-03-04 PROCEDURE — 10004651 HB RX, NO CHARGE ITEM: Performed by: INTERNAL MEDICINE

## 2020-03-04 PROCEDURE — 10002803 HB RX 637: Performed by: INTERNAL MEDICINE

## 2020-03-04 PROCEDURE — 99232 SBSQ HOSP IP/OBS MODERATE 35: CPT | Performed by: INTERNAL MEDICINE

## 2020-03-04 PROCEDURE — 36415 COLL VENOUS BLD VENIPUNCTURE: CPT

## 2020-03-04 PROCEDURE — 13003289 HB OXYGEN THERAPY DAILY

## 2020-03-04 PROCEDURE — 82565 ASSAY OF CREATININE: CPT

## 2020-03-04 PROCEDURE — 99239 HOSP IP/OBS DSCHRG MGMT >30: CPT | Performed by: INTERNAL MEDICINE

## 2020-03-04 PROCEDURE — 83735 ASSAY OF MAGNESIUM: CPT

## 2020-03-04 PROCEDURE — 93005 ELECTROCARDIOGRAM TRACING: CPT

## 2020-03-04 PROCEDURE — X1094 NO CHARGE VISIT: HCPCS | Performed by: INTERNAL MEDICINE

## 2020-03-04 PROCEDURE — 93005 ELECTROCARDIOGRAM TRACING: CPT | Performed by: INTERNAL MEDICINE

## 2020-03-04 RX ORDER — DOFETILIDE 0.5 MG/1
500 CAPSULE ORAL 2 TIMES DAILY
Qty: 60 CAPSULE | Refills: 3 | Status: SHIPPED | OUTPATIENT
Start: 2020-03-14 | End: 2020-03-09 | Stop reason: CLARIF

## 2020-03-04 RX ORDER — DOFETILIDE 0.25 MG/1
500 CAPSULE ORAL 2 TIMES DAILY
Qty: 40 CAPSULE | Refills: 0 | Status: SHIPPED | OUTPATIENT
Start: 2020-03-04 | End: 2020-05-07

## 2020-03-04 RX ADMIN — METOPROLOL SUCCINATE 100 MG: 100 TABLET, EXTENDED RELEASE ORAL at 09:17

## 2020-03-04 RX ADMIN — DOFETILIDE 500 MCG: 0.5 CAPSULE ORAL at 09:17

## 2020-03-04 RX ADMIN — BUMETANIDE 1 MG: 1 TABLET ORAL at 09:17

## 2020-03-04 RX ADMIN — ASPIRIN 81 MG: 81 TABLET, COATED ORAL at 09:18

## 2020-03-04 RX ADMIN — SODIUM CHLORIDE, PRESERVATIVE FREE 2 ML: 5 INJECTION INTRAVENOUS at 10:57

## 2020-03-04 RX ADMIN — ATORVASTATIN CALCIUM 10 MG: 10 TABLET, FILM COATED ORAL at 09:18

## 2020-03-04 RX ADMIN — GABAPENTIN 300 MG: 300 CAPSULE ORAL at 09:24

## 2020-03-04 ASSESSMENT — PAIN SCALES - GENERAL: PAINLEVEL_OUTOF10: 0

## 2020-03-05 ENCOUNTER — ADVANCED DIRECTIVES (OUTPATIENT)
Dept: HEALTH INFORMATION MANAGEMENT | Age: 55
End: 2020-03-05

## 2020-03-05 DIAGNOSIS — R07.9 CHEST PAIN, UNSPECIFIED TYPE: Primary | ICD-10-CM

## 2020-03-06 ENCOUNTER — APPOINTMENT (OUTPATIENT)
Dept: INTERPRETER SERVICES | Age: 55
End: 2020-03-06

## 2020-03-07 ENCOUNTER — TELEPHONE (OUTPATIENT)
Dept: CARDIOLOGY | Age: 55
End: 2020-03-07

## 2020-03-09 ENCOUNTER — TELEPHONE (OUTPATIENT)
Dept: CARDIOLOGY | Age: 55
End: 2020-03-09

## 2020-03-11 ENCOUNTER — APPOINTMENT (OUTPATIENT)
Dept: CARDIOLOGY | Age: 55
End: 2020-03-11

## 2020-03-13 ENCOUNTER — OFFICE VISIT (OUTPATIENT)
Dept: CARDIOLOGY | Age: 55
End: 2020-03-13

## 2020-03-13 VITALS
SYSTOLIC BLOOD PRESSURE: 122 MMHG | BODY MASS INDEX: 45.1 KG/M2 | HEIGHT: 70 IN | WEIGHT: 315 LBS | DIASTOLIC BLOOD PRESSURE: 60 MMHG | HEART RATE: 87 BPM

## 2020-03-13 DIAGNOSIS — I48.19 OTHER PERSISTENT ATRIAL FIBRILLATION (CMD): Primary | ICD-10-CM

## 2020-03-13 PROCEDURE — 3074F SYST BP LT 130 MM HG: CPT | Performed by: INTERNAL MEDICINE

## 2020-03-13 PROCEDURE — 93000 ELECTROCARDIOGRAM COMPLETE: CPT | Performed by: INTERNAL MEDICINE

## 2020-03-13 PROCEDURE — 3078F DIAST BP <80 MM HG: CPT | Performed by: INTERNAL MEDICINE

## 2020-03-13 PROCEDURE — 99215 OFFICE O/P EST HI 40 MIN: CPT | Performed by: INTERNAL MEDICINE

## 2020-03-13 ASSESSMENT — ENCOUNTER SYMPTOMS
HEMOPTYSIS: 0
ALLERGIC/IMMUNOLOGIC COMMENTS: NO NEW FOOD ALLERGIES
SUSPICIOUS LESIONS: 0
FEVER: 0
BRUISES/BLEEDS EASILY: 0
WEIGHT GAIN: 0
CHILLS: 0
COUGH: 0
HEMATOCHEZIA: 0
WEIGHT LOSS: 0

## 2020-03-18 ENCOUNTER — TELEPHONE (OUTPATIENT)
Dept: CARDIOLOGY | Age: 55
End: 2020-03-18

## 2020-05-06 ENCOUNTER — PREP FOR CASE (OUTPATIENT)
Dept: CARDIOLOGY | Age: 55
End: 2020-05-06

## 2020-05-06 ENCOUNTER — TELEPHONE (OUTPATIENT)
Dept: CARDIOLOGY | Age: 55
End: 2020-05-06

## 2020-05-06 DIAGNOSIS — I48.91 ATRIAL FIBRILLATION, UNSPECIFIED TYPE (CMD): Primary | ICD-10-CM

## 2020-05-06 RX ORDER — SODIUM CHLORIDE 9 MG/ML
INJECTION, SOLUTION INTRAVENOUS CONTINUOUS
Status: CANCELLED | OUTPATIENT
Start: 2020-05-06

## 2020-05-07 SDOH — HEALTH STABILITY: MENTAL HEALTH: HOW OFTEN DO YOU HAVE A DRINK CONTAINING ALCOHOL?: NEVER

## 2020-05-08 ENCOUNTER — TELEPHONE (OUTPATIENT)
Dept: CARDIOLOGY | Age: 55
End: 2020-05-08

## 2020-05-11 ENCOUNTER — HOSPITAL ENCOUNTER (OUTPATIENT)
Dept: CARDIOLOGY | Age: 55
DRG: 201 | End: 2020-05-11
Attending: INTERNAL MEDICINE | Admitting: INTERNAL MEDICINE

## 2020-05-11 ENCOUNTER — HOSPITAL ENCOUNTER (INPATIENT)
Age: 55
LOS: 2 days | Discharge: HOME | DRG: 201 | End: 2020-05-13
Attending: INTERNAL MEDICINE | Admitting: INTERNAL MEDICINE

## 2020-05-11 ENCOUNTER — PREP FOR CASE (OUTPATIENT)
Dept: CARDIOLOGY | Age: 55
End: 2020-05-11

## 2020-05-11 DIAGNOSIS — I48.91 ATRIAL FIBRILLATION, UNSPECIFIED TYPE (CMD): ICD-10-CM

## 2020-05-11 LAB
ANION GAP SERPL CALC-SCNC: 9 MMOL/L (ref 10–20)
BUN SERPL-MCNC: 18 MG/DL (ref 6–20)
BUN/CREAT SERPL: 19 (ref 7–25)
CALCIUM SERPL-MCNC: 9.2 MG/DL (ref 8.4–10.2)
CHLORIDE SERPL-SCNC: 109 MMOL/L (ref 98–107)
CO2 SERPL-SCNC: 27 MMOL/L (ref 21–32)
CREAT SERPL-MCNC: 0.96 MG/DL (ref 0.67–1.17)
GLUCOSE SERPL-MCNC: 109 MG/DL (ref 65–99)
MAGNESIUM SERPL-MCNC: 2 MG/DL (ref 1.7–2.4)
POTASSIUM SERPL-SCNC: 5 MMOL/L (ref 3.4–5.1)
SODIUM SERPL-SCNC: 140 MMOL/L (ref 135–145)

## 2020-05-11 PROCEDURE — G0378 HOSPITAL OBSERVATION PER HR: HCPCS

## 2020-05-11 PROCEDURE — 10002803 HB RX 637: Performed by: INTERNAL MEDICINE

## 2020-05-11 PROCEDURE — 93005 ELECTROCARDIOGRAM TRACING: CPT | Performed by: INTERNAL MEDICINE

## 2020-05-11 PROCEDURE — 80048 BASIC METABOLIC PNL TOTAL CA: CPT

## 2020-05-11 PROCEDURE — 10002803 HB RX 637: Performed by: NURSE PRACTITIONER

## 2020-05-11 PROCEDURE — 92960 CARDIOVERSION ELECTRIC EXT: CPT | Performed by: INTERNAL MEDICINE

## 2020-05-11 PROCEDURE — 36415 COLL VENOUS BLD VENIPUNCTURE: CPT

## 2020-05-11 PROCEDURE — 10004651 HB RX, NO CHARGE ITEM: Performed by: NURSE PRACTITIONER

## 2020-05-11 PROCEDURE — 83735 ASSAY OF MAGNESIUM: CPT

## 2020-05-11 PROCEDURE — 10004651 HB RX, NO CHARGE ITEM: Performed by: INTERNAL MEDICINE

## 2020-05-11 RX ORDER — POTASSIUM CHLORIDE 14.9 MG/ML
20 INJECTION INTRAVENOUS PRN
Status: DISCONTINUED | OUTPATIENT
Start: 2020-05-11 | End: 2020-05-13 | Stop reason: HOSPADM

## 2020-05-11 RX ORDER — SODIUM CHLORIDE 9 MG/ML
INJECTION, SOLUTION INTRAVENOUS CONTINUOUS
Status: DISCONTINUED | OUTPATIENT
Start: 2020-05-11 | End: 2020-05-12

## 2020-05-11 RX ORDER — MAGNESIUM SULFATE 1 G/100ML
1 INJECTION INTRAVENOUS PRN
Status: DISCONTINUED | OUTPATIENT
Start: 2020-05-11 | End: 2020-05-13 | Stop reason: HOSPADM

## 2020-05-11 RX ORDER — ATORVASTATIN CALCIUM 10 MG/1
10 TABLET, FILM COATED ORAL NIGHTLY
Status: DISCONTINUED | OUTPATIENT
Start: 2020-05-11 | End: 2020-05-13 | Stop reason: HOSPADM

## 2020-05-11 RX ORDER — BUMETANIDE 1 MG/1
1 TABLET ORAL 2 TIMES DAILY
Status: DISCONTINUED | OUTPATIENT
Start: 2020-05-11 | End: 2020-05-13 | Stop reason: HOSPADM

## 2020-05-11 RX ORDER — SPIRONOLACTONE 25 MG/1
12.5 TABLET, FILM COATED ORAL DAILY
Status: DISCONTINUED | OUTPATIENT
Start: 2020-05-11 | End: 2020-05-13 | Stop reason: HOSPADM

## 2020-05-11 RX ORDER — POTASSIUM CHLORIDE 14.9 G/1000ML
40 INJECTION, SOLUTION INTRAVENOUS PRN
Status: DISCONTINUED | OUTPATIENT
Start: 2020-05-11 | End: 2020-05-13 | Stop reason: HOSPADM

## 2020-05-11 RX ORDER — DOFETILIDE 0.5 MG/1
500 CAPSULE ORAL EVERY 12 HOURS SCHEDULED
Status: DISCONTINUED | OUTPATIENT
Start: 2020-05-11 | End: 2020-05-13 | Stop reason: HOSPADM

## 2020-05-11 RX ORDER — MULTIVITAMIN,THER AND MINERALS
1 TABLET ORAL NIGHTLY
COMMUNITY

## 2020-05-11 RX ORDER — TRAMADOL HYDROCHLORIDE 50 MG/1
50 TABLET ORAL EVERY 6 HOURS PRN
Status: DISCONTINUED | OUTPATIENT
Start: 2020-05-11 | End: 2020-05-11

## 2020-05-11 RX ORDER — POTASSIUM CHLORIDE 20 MEQ/1
20 TABLET, EXTENDED RELEASE ORAL PRN
Status: DISCONTINUED | OUTPATIENT
Start: 2020-05-11 | End: 2020-05-13 | Stop reason: HOSPADM

## 2020-05-11 RX ORDER — POTASSIUM CHLORIDE 20 MEQ/1
40 TABLET, EXTENDED RELEASE ORAL PRN
Status: DISCONTINUED | OUTPATIENT
Start: 2020-05-11 | End: 2020-05-13 | Stop reason: HOSPADM

## 2020-05-11 RX ORDER — DILTIAZEM HYDROCHLORIDE 240 MG/1
240 CAPSULE, EXTENDED RELEASE ORAL NIGHTLY
Status: DISCONTINUED | OUTPATIENT
Start: 2020-05-11 | End: 2020-05-13 | Stop reason: HOSPADM

## 2020-05-11 RX ORDER — SPIRONOLACTONE 25 MG/1
25 TABLET ORAL DAILY
Status: DISCONTINUED | OUTPATIENT
Start: 2020-05-11 | End: 2020-05-11

## 2020-05-11 RX ORDER — 0.9 % SODIUM CHLORIDE 0.9 %
2 VIAL (ML) INJECTION EVERY 12 HOURS SCHEDULED
Status: DISCONTINUED | OUTPATIENT
Start: 2020-05-11 | End: 2020-05-13 | Stop reason: HOSPADM

## 2020-05-11 RX ORDER — METOPROLOL SUCCINATE 100 MG/1
100 TABLET, EXTENDED RELEASE ORAL 2 TIMES DAILY
Status: DISCONTINUED | OUTPATIENT
Start: 2020-05-11 | End: 2020-05-13 | Stop reason: HOSPADM

## 2020-05-11 RX ADMIN — BUMETANIDE 1 MG: 1 TABLET ORAL at 20:40

## 2020-05-11 RX ADMIN — DILTIAZEM HYDROCHLORIDE 240 MG: 240 CAPSULE, EXTENDED RELEASE ORAL at 20:40

## 2020-05-11 RX ADMIN — SPIRONOLACTONE 12.5 MG: 25 TABLET, FILM COATED ORAL at 17:10

## 2020-05-11 RX ADMIN — RIVAROXABAN 20 MG: 20 TABLET, FILM COATED ORAL at 17:10

## 2020-05-11 RX ADMIN — ATORVASTATIN CALCIUM 10 MG: 10 TABLET, FILM COATED ORAL at 20:40

## 2020-05-11 RX ADMIN — DOFETILIDE 500 MCG: 0.5 CAPSULE ORAL at 15:06

## 2020-05-11 RX ADMIN — METOPROLOL SUCCINATE 100 MG: 100 TABLET, EXTENDED RELEASE ORAL at 20:40

## 2020-05-11 RX ADMIN — SODIUM CHLORIDE, PRESERVATIVE FREE 2 ML: 5 INJECTION INTRAVENOUS at 20:41

## 2020-05-11 RX ADMIN — SODIUM CHLORIDE, PRESERVATIVE FREE 2 ML: 5 INJECTION INTRAVENOUS at 15:52

## 2020-05-11 RX ADMIN — ASPIRIN 81 MG: 81 TABLET, COATED ORAL at 20:40

## 2020-05-11 ASSESSMENT — COLUMBIA-SUICIDE SEVERITY RATING SCALE - C-SSRS
2. HAVE YOU ACTUALLY HAD ANY THOUGHTS OF KILLING YOURSELF?: NO
1. WITHIN THE PAST MONTH, HAVE YOU WISHED YOU WERE DEAD OR WISHED YOU COULD GO TO SLEEP AND NOT WAKE UP?: NO
IS THE PATIENT ABLE TO COMPLETE C-SSRS: YES
6. HAVE YOU EVER DONE ANYTHING, STARTED TO DO ANYTHING, OR PREPARED TO DO ANYTHING TO END YOUR LIFE?: NO

## 2020-05-11 ASSESSMENT — PATIENT HEALTH QUESTIONNAIRE - PHQ9
1. LITTLE INTEREST OR PLEASURE IN DOING THINGS: NOT AT ALL
SUM OF ALL RESPONSES TO PHQ9 QUESTIONS 1 AND 2: 0
2. FEELING DOWN, DEPRESSED OR HOPELESS: NOT AT ALL
SUM OF ALL RESPONSES TO PHQ9 QUESTIONS 1 AND 2: 0
IS PATIENT ABLE TO COMPLETE PHQ2 OR PHQ9: YES

## 2020-05-11 ASSESSMENT — LIFESTYLE VARIABLES
ALCOHOL_USE_STATUS: NO OR LOW RISK WITH VALIDATED TOOL
HOW OFTEN DO YOU HAVE 6 OR MORE DRINKS ON ONE OCCASION: NEVER
HOW OFTEN DO YOU HAVE A DRINK CONTAINING ALCOHOL: MONTHLY OR LESS
AUDIT-C TOTAL SCORE: 1
HOW MANY STANDARD DRINKS CONTAINING ALCOHOL DO YOU HAVE ON A TYPICAL DAY: 0,1 OR 2

## 2020-05-11 ASSESSMENT — PAIN SCALES - GENERAL
PAINLEVEL_OUTOF10: 0

## 2020-05-11 ASSESSMENT — ACTIVITIES OF DAILY LIVING (ADL)
ADL_SCORE: 12
RECENT_DECLINE_ADL: NO
ADL_BEFORE_ADMISSION: INDEPENDENT
CHRONIC_PAIN_PRESENT: NO
CHRONIC_PAIN_PRESENT: NO
ADL_SCORE: 12
ADL_SHORT_OF_BREATH: NO
ADL_BEFORE_ADMISSION: INDEPENDENT

## 2020-05-11 ASSESSMENT — COGNITIVE AND FUNCTIONAL STATUS - GENERAL
ARE YOU BLIND OR DO YOU HAVE SERIOUS DIFFICULTY SEEING, EVEN WHEN WEARING GLASSES: NO
ARE YOU DEAF OR DO YOU HAVE SERIOUS DIFFICULTY  HEARING: NO
DO YOU HAVE SERIOUS DIFFICULTY WALKING OR CLIMBING STAIRS: NO
ARE YOU BLIND OR DO YOU HAVE SERIOUS DIFFICULTY SEEING, EVEN WHEN WEARING GLASSES: NO
ARE YOU DEAF OR DO YOU HAVE SERIOUS DIFFICULTY  HEARING: NO
BECAUSE OF A PHYSICAL, MENTAL, OR EMOTIONAL CONDITION, DO YOU HAVE SERIOUS DIFFICULTY CONCENTRATING, REMEMBERING OR MAKING DECISIONS: NO

## 2020-05-12 ENCOUNTER — ANESTHESIA (OUTPATIENT)
Dept: CARDIOLOGY | Age: 55
DRG: 201 | End: 2020-05-12

## 2020-05-12 ENCOUNTER — APPOINTMENT (OUTPATIENT)
Dept: CARDIOLOGY | Age: 55
DRG: 201 | End: 2020-05-12
Attending: NURSE PRACTITIONER

## 2020-05-12 ENCOUNTER — ANESTHESIA EVENT (OUTPATIENT)
Dept: CARDIOLOGY | Age: 55
DRG: 201 | End: 2020-05-12

## 2020-05-12 LAB
ANION GAP SERPL CALC-SCNC: 9 MMOL/L (ref 10–20)
ATRIAL RATE (BPM): 192
ATRIAL RATE (BPM): 312
ATRIAL RATE (BPM): 78
BUN SERPL-MCNC: 16 MG/DL (ref 6–20)
BUN/CREAT SERPL: 18 (ref 7–25)
CALCIUM SERPL-MCNC: 8.6 MG/DL (ref 8.4–10.2)
CHLORIDE SERPL-SCNC: 107 MMOL/L (ref 98–107)
CO2 SERPL-SCNC: 27 MMOL/L (ref 21–32)
CREAT SERPL-MCNC: 0.91 MG/DL (ref 0.67–1.17)
GLUCOSE SERPL-MCNC: 113 MG/DL (ref 65–99)
MAGNESIUM SERPL-MCNC: 1.9 MG/DL (ref 1.7–2.4)
P AXIS (DEGREES): 58
POTASSIUM SERPL-SCNC: 4.4 MMOL/L (ref 3.4–5.1)
PR-INTERVAL (MSEC): 154
QRS-INTERVAL (MSEC): 84
QRS-INTERVAL (MSEC): 90
QRS-INTERVAL (MSEC): 94
QT-INTERVAL (MSEC): 332
QT-INTERVAL (MSEC): 354
QT-INTERVAL (MSEC): 418
QTC: 453
QTC: 476
QTC: 487
R AXIS (DEGREES): -132
R AXIS (DEGREES): -84
R AXIS (DEGREES): -96
REPORT TEXT: NORMAL
SODIUM SERPL-SCNC: 139 MMOL/L (ref 135–145)
T AXIS (DEGREES): -12
T AXIS (DEGREES): -12
T AXIS (DEGREES): -24
VENTRICULAR RATE EKG/MIN (BPM): 112
VENTRICULAR RATE EKG/MIN (BPM): 114
VENTRICULAR RATE EKG/MIN (BPM): 78

## 2020-05-12 PROCEDURE — G0378 HOSPITAL OBSERVATION PER HR: HCPCS

## 2020-05-12 PROCEDURE — 10002803 HB RX 637: Performed by: NURSE PRACTITIONER

## 2020-05-12 PROCEDURE — 5A2204Z RESTORATION OF CARDIAC RHYTHM, SINGLE: ICD-10-PCS | Performed by: INTERNAL MEDICINE

## 2020-05-12 PROCEDURE — 92960 CARDIOVERSION ELECTRIC EXT: CPT | Performed by: INTERNAL MEDICINE

## 2020-05-12 PROCEDURE — 10002807 HB RX 258: Performed by: INTERNAL MEDICINE

## 2020-05-12 PROCEDURE — 10002803 HB RX 637: Performed by: INTERNAL MEDICINE

## 2020-05-12 PROCEDURE — 83735 ASSAY OF MAGNESIUM: CPT

## 2020-05-12 PROCEDURE — 10002801 HB RX 250 W/O HCPCS: Performed by: INTERNAL MEDICINE

## 2020-05-12 PROCEDURE — 36415 COLL VENOUS BLD VENIPUNCTURE: CPT

## 2020-05-12 PROCEDURE — 13000001 HB PHASE II RECOVERY EA 30 MINUTES

## 2020-05-12 PROCEDURE — 80048 BASIC METABOLIC PNL TOTAL CA: CPT

## 2020-05-12 PROCEDURE — 92960 CARDIOVERSION ELECTRIC EXT: CPT

## 2020-05-12 PROCEDURE — 93005 ELECTROCARDIOGRAM TRACING: CPT | Performed by: INTERNAL MEDICINE

## 2020-05-12 PROCEDURE — 10004651 HB RX, NO CHARGE ITEM: Performed by: INTERNAL MEDICINE

## 2020-05-12 PROCEDURE — 10006031 HB ROOM CHARGE TELEMETRY

## 2020-05-12 PROCEDURE — 99152 MOD SED SAME PHYS/QHP 5/>YRS: CPT

## 2020-05-12 PROCEDURE — 10004651 HB RX, NO CHARGE ITEM: Performed by: NURSE PRACTITIONER

## 2020-05-12 PROCEDURE — 13000008 HB ANESTHESIA MAC OUTSIDE OR

## 2020-05-12 RX ORDER — METHOHEXITAL IN WATER/PF 100MG/10ML
1 SYRINGE (ML) INTRAVENOUS ONCE
Status: COMPLETED | OUTPATIENT
Start: 2020-05-12 | End: 2020-05-12

## 2020-05-12 RX ADMIN — METOPROLOL SUCCINATE 100 MG: 100 TABLET, EXTENDED RELEASE ORAL at 10:22

## 2020-05-12 RX ADMIN — METOPROLOL SUCCINATE 100 MG: 100 TABLET, EXTENDED RELEASE ORAL at 21:47

## 2020-05-12 RX ADMIN — ATORVASTATIN CALCIUM 10 MG: 10 TABLET, FILM COATED ORAL at 21:46

## 2020-05-12 RX ADMIN — DILTIAZEM HYDROCHLORIDE 240 MG: 240 CAPSULE, EXTENDED RELEASE ORAL at 21:47

## 2020-05-12 RX ADMIN — SPIRONOLACTONE 12.5 MG: 25 TABLET, FILM COATED ORAL at 10:22

## 2020-05-12 RX ADMIN — Medication 100 MG: at 09:10

## 2020-05-12 RX ADMIN — DOFETILIDE 500 MCG: 0.5 CAPSULE ORAL at 18:32

## 2020-05-12 RX ADMIN — BUMETANIDE 1 MG: 1 TABLET ORAL at 10:22

## 2020-05-12 RX ADMIN — METHOHEXITAL SODIUM 100 MG: 500 INJECTION, POWDER, LYOPHILIZED, FOR SOLUTION INTRAMUSCULAR; INTRAVENOUS; RECTAL at 09:10

## 2020-05-12 RX ADMIN — SODIUM CHLORIDE, PRESERVATIVE FREE 2 ML: 5 INJECTION INTRAVENOUS at 10:23

## 2020-05-12 RX ADMIN — ASPIRIN 81 MG: 81 TABLET, COATED ORAL at 21:47

## 2020-05-12 RX ADMIN — SODIUM CHLORIDE, PRESERVATIVE FREE 2 ML: 5 INJECTION INTRAVENOUS at 21:48

## 2020-05-12 RX ADMIN — SODIUM CHLORIDE: 0.9 INJECTION, SOLUTION INTRAVENOUS at 06:27

## 2020-05-12 RX ADMIN — RIVAROXABAN 20 MG: 20 TABLET, FILM COATED ORAL at 18:33

## 2020-05-12 RX ADMIN — DOFETILIDE 500 MCG: 0.5 CAPSULE ORAL at 06:22

## 2020-05-12 ASSESSMENT — COGNITIVE AND FUNCTIONAL STATUS - GENERAL
BECAUSE OF A PHYSICAL, MENTAL, OR EMOTIONAL CONDITION, DO YOU HAVE SERIOUS DIFFICULTY CONCENTRATING, REMEMBERING OR MAKING DECISIONS: NO
DO YOU HAVE DIFFICULTY DRESSING OR BATHING: NO
DO YOU HAVE SERIOUS DIFFICULTY WALKING OR CLIMBING STAIRS: NO
BECAUSE OF A PHYSICAL, MENTAL, OR EMOTIONAL CONDITION, DO YOU HAVE DIFFICULTY DOING ERRANDS ALONE: NO

## 2020-05-12 ASSESSMENT — PAIN SCALES - GENERAL
PAINLEVEL_OUTOF10: 0
PAINLEVEL_OUTOF10: 0

## 2020-05-13 ENCOUNTER — APPOINTMENT (OUTPATIENT)
Dept: CARDIOLOGY | Age: 55
DRG: 201 | End: 2020-05-13
Attending: INTERNAL MEDICINE

## 2020-05-13 VITALS
SYSTOLIC BLOOD PRESSURE: 115 MMHG | RESPIRATION RATE: 18 BRPM | BODY MASS INDEX: 45.1 KG/M2 | TEMPERATURE: 98.4 F | HEIGHT: 70 IN | HEART RATE: 74 BPM | WEIGHT: 315 LBS | OXYGEN SATURATION: 97 % | DIASTOLIC BLOOD PRESSURE: 80 MMHG

## 2020-05-13 LAB
ATRIAL RATE (BPM): 72
ATRIAL RATE (BPM): 75
MAGNESIUM SERPL-MCNC: 2.1 MG/DL (ref 1.7–2.4)
P AXIS (DEGREES): 54
P AXIS (DEGREES): 66
POTASSIUM SERPL-SCNC: 4.4 MMOL/L (ref 3.4–5.1)
PR-INTERVAL (MSEC): 152
PR-INTERVAL (MSEC): 154
QRS-INTERVAL (MSEC): 88
QRS-INTERVAL (MSEC): 92
QT-INTERVAL (MSEC): 426
QT-INTERVAL (MSEC): 454
QTC: 476
QTC: 497
R AXIS (DEGREES): -60
R AXIS (DEGREES): -72
REPORT TEXT: NORMAL
REPORT TEXT: NORMAL
T AXIS (DEGREES): -18
T AXIS (DEGREES): -27
VENTRICULAR RATE EKG/MIN (BPM): 72
VENTRICULAR RATE EKG/MIN (BPM): 75

## 2020-05-13 PROCEDURE — 36415 COLL VENOUS BLD VENIPUNCTURE: CPT

## 2020-05-13 PROCEDURE — 10002803 HB RX 637: Performed by: INTERNAL MEDICINE

## 2020-05-13 PROCEDURE — 93005 ELECTROCARDIOGRAM TRACING: CPT | Performed by: INTERNAL MEDICINE

## 2020-05-13 PROCEDURE — 84132 ASSAY OF SERUM POTASSIUM: CPT

## 2020-05-13 PROCEDURE — 83735 ASSAY OF MAGNESIUM: CPT

## 2020-05-13 PROCEDURE — 99232 SBSQ HOSP IP/OBS MODERATE 35: CPT | Performed by: INTERNAL MEDICINE

## 2020-05-13 PROCEDURE — 10002803 HB RX 637: Performed by: NURSE PRACTITIONER

## 2020-05-13 PROCEDURE — 10004651 HB RX, NO CHARGE ITEM: Performed by: INTERNAL MEDICINE

## 2020-05-13 RX ORDER — DOFETILIDE 0.5 MG/1
500 CAPSULE ORAL EVERY 12 HOURS SCHEDULED
Refills: 3 | INPATIENT
Start: 2020-05-13 | End: 2020-05-13

## 2020-05-13 RX ORDER — SPIRONOLACTONE 25 MG/1
12.5 TABLET ORAL NIGHTLY
Qty: 30 TABLET | Refills: 3 | Status: SHIPPED | OUTPATIENT
Start: 2020-05-13 | End: 2020-09-09

## 2020-05-13 RX ORDER — DOFETILIDE 0.5 MG/1
500 CAPSULE ORAL EVERY 12 HOURS SCHEDULED
Qty: 60 CAPSULE | Refills: 3 | Status: SHIPPED | OUTPATIENT
Start: 2020-05-13 | End: 2020-07-02

## 2020-05-13 RX ORDER — SPIRONOLACTONE 25 MG/1
12.5 TABLET ORAL NIGHTLY
INPATIENT
Start: 2020-05-13 | End: 2020-05-13 | Stop reason: SDUPTHER

## 2020-05-13 RX ADMIN — DOFETILIDE 500 MCG: 0.5 CAPSULE ORAL at 06:27

## 2020-05-13 RX ADMIN — METOPROLOL SUCCINATE 100 MG: 100 TABLET, EXTENDED RELEASE ORAL at 09:27

## 2020-05-13 RX ADMIN — SPIRONOLACTONE 12.5 MG: 25 TABLET, FILM COATED ORAL at 09:28

## 2020-05-13 RX ADMIN — BUMETANIDE 1 MG: 1 TABLET ORAL at 09:27

## 2020-05-13 RX ADMIN — SODIUM CHLORIDE, PRESERVATIVE FREE 2 ML: 5 INJECTION INTRAVENOUS at 09:29

## 2020-05-13 RX ADMIN — DOFETILIDE 500 MCG: 0.5 CAPSULE ORAL at 17:34

## 2020-05-13 RX ADMIN — RIVAROXABAN 20 MG: 20 TABLET, FILM COATED ORAL at 17:34

## 2020-05-13 ASSESSMENT — PAIN SCALES - GENERAL
PAINLEVEL_OUTOF10: 0
PAINLEVEL_OUTOF10: 0

## 2020-05-14 LAB
ATRIAL RATE (BPM): 89
P AXIS (DEGREES): 2
PR-INTERVAL (MSEC): 176
QRS-INTERVAL (MSEC): 86
QT-INTERVAL (MSEC): 402
QTC: 489
R AXIS (DEGREES): -65
REPORT TEXT: NORMAL
T AXIS (DEGREES): -16
VENTRICULAR RATE EKG/MIN (BPM): 89

## 2020-05-26 ENCOUNTER — TELEPHONE (OUTPATIENT)
Dept: CARDIOLOGY | Age: 55
End: 2020-05-26

## 2020-05-26 ENCOUNTER — APPOINTMENT (OUTPATIENT)
Dept: CARDIOLOGY | Age: 55
End: 2020-05-26

## 2020-07-02 ENCOUNTER — OFFICE VISIT (OUTPATIENT)
Dept: CARDIOLOGY | Age: 55
End: 2020-07-02

## 2020-07-02 VITALS
BODY MASS INDEX: 60.83 KG/M2 | HEIGHT: 70 IN | SYSTOLIC BLOOD PRESSURE: 134 MMHG | DIASTOLIC BLOOD PRESSURE: 62 MMHG | RESPIRATION RATE: 16 BRPM

## 2020-07-02 DIAGNOSIS — I48.0 PAROXYSMAL ATRIAL FIBRILLATION (CMD): ICD-10-CM

## 2020-07-02 DIAGNOSIS — E66.01 MORBID OBESITY (CMD): ICD-10-CM

## 2020-07-02 DIAGNOSIS — I10 HYPERTENSION, BENIGN: ICD-10-CM

## 2020-07-02 DIAGNOSIS — R53.83 MALAISE AND FATIGUE: ICD-10-CM

## 2020-07-02 DIAGNOSIS — R53.81 MALAISE AND FATIGUE: ICD-10-CM

## 2020-07-02 DIAGNOSIS — I48.0 PAROXYSMAL A-FIB (CMD): Primary | ICD-10-CM

## 2020-07-02 DIAGNOSIS — E78.2 MIXED HYPERLIPIDEMIA: ICD-10-CM

## 2020-07-02 PROCEDURE — 93000 ELECTROCARDIOGRAM COMPLETE: CPT | Performed by: NURSE PRACTITIONER

## 2020-07-02 PROCEDURE — 3078F DIAST BP <80 MM HG: CPT | Performed by: NURSE PRACTITIONER

## 2020-07-02 PROCEDURE — 99214 OFFICE O/P EST MOD 30 MIN: CPT | Performed by: NURSE PRACTITIONER

## 2020-07-02 PROCEDURE — 3075F SYST BP GE 130 - 139MM HG: CPT | Performed by: NURSE PRACTITIONER

## 2020-07-02 SDOH — HEALTH STABILITY: MENTAL HEALTH: HOW OFTEN DO YOU HAVE A DRINK CONTAINING ALCOHOL?: NEVER

## 2020-07-02 ASSESSMENT — ENCOUNTER SYMPTOMS
SUSPICIOUS LESIONS: 0
CHILLS: 0
WEIGHT LOSS: 0
HEMOPTYSIS: 0
FEVER: 0
BRUISES/BLEEDS EASILY: 0
ALLERGIC/IMMUNOLOGIC COMMENTS: NO NEW FOOD ALLERGIES
WEIGHT GAIN: 1
COUGH: 0
HEMATOCHEZIA: 0

## 2020-08-19 ENCOUNTER — APPOINTMENT (OUTPATIENT)
Dept: CARDIOLOGY | Age: 55
End: 2020-08-19

## 2020-08-20 PROBLEM — R73.03 PREDIABETES: Status: ACTIVE | Noted: 2020-08-20

## 2020-09-09 ENCOUNTER — OFFICE VISIT (OUTPATIENT)
Dept: CARDIOLOGY | Age: 55
End: 2020-09-09

## 2020-09-09 VITALS
HEART RATE: 68 BPM | BODY MASS INDEX: 46.65 KG/M2 | RESPIRATION RATE: 14 BRPM | HEIGHT: 69 IN | DIASTOLIC BLOOD PRESSURE: 80 MMHG | SYSTOLIC BLOOD PRESSURE: 126 MMHG | WEIGHT: 315 LBS

## 2020-09-09 DIAGNOSIS — I48.0 PAROXYSMAL A-FIB (CMD): Primary | ICD-10-CM

## 2020-09-09 PROCEDURE — 3079F DIAST BP 80-89 MM HG: CPT | Performed by: INTERNAL MEDICINE

## 2020-09-09 PROCEDURE — 93000 ELECTROCARDIOGRAM COMPLETE: CPT | Performed by: INTERNAL MEDICINE

## 2020-09-09 PROCEDURE — 99215 OFFICE O/P EST HI 40 MIN: CPT | Performed by: INTERNAL MEDICINE

## 2020-09-09 PROCEDURE — 3074F SYST BP LT 130 MM HG: CPT | Performed by: INTERNAL MEDICINE

## 2020-09-09 RX ORDER — TRAZODONE HYDROCHLORIDE 100 MG/1
100 TABLET ORAL 2 TIMES DAILY
COMMUNITY
Start: 2020-08-20 | End: 2020-09-09

## 2020-09-09 RX ORDER — QUETIAPINE FUMARATE 50 MG/1
50 TABLET, FILM COATED ORAL DAILY
COMMUNITY
Start: 2020-08-21 | End: 2020-09-09

## 2020-09-09 RX ORDER — ESCITALOPRAM OXALATE 20 MG/1
20 TABLET ORAL DAILY
COMMUNITY
Start: 2020-08-21 | End: 2022-05-06 | Stop reason: ALTCHOICE

## 2020-09-09 RX ORDER — SERTRALINE HYDROCHLORIDE 100 MG/1
100 TABLET, FILM COATED ORAL 2 TIMES DAILY
COMMUNITY
Start: 2020-07-18 | End: 2020-09-09

## 2020-09-09 SDOH — HEALTH STABILITY: PHYSICAL HEALTH: ON AVERAGE, HOW MANY DAYS PER WEEK DO YOU ENGAGE IN MODERATE TO STRENUOUS EXERCISE (LIKE A BRISK WALK)?: 4 DAYS

## 2020-09-09 SDOH — HEALTH STABILITY: PHYSICAL HEALTH: ON AVERAGE, HOW MANY MINUTES DO YOU ENGAGE IN EXERCISE AT THIS LEVEL?: 60 MIN

## 2020-09-09 ASSESSMENT — PATIENT HEALTH QUESTIONNAIRE - PHQ9
CLINICAL INTERPRETATION OF PHQ2 SCORE: NO FURTHER SCREENING NEEDED
SUM OF ALL RESPONSES TO PHQ9 QUESTIONS 1 AND 2: 0
CLINICAL INTERPRETATION OF PHQ9 SCORE: NO FURTHER SCREENING NEEDED
SUM OF ALL RESPONSES TO PHQ9 QUESTIONS 1 AND 2: 0
2. FEELING DOWN, DEPRESSED OR HOPELESS: NOT AT ALL
1. LITTLE INTEREST OR PLEASURE IN DOING THINGS: NOT AT ALL

## 2021-01-06 RX ORDER — DILTIAZEM HYDROCHLORIDE 240 MG/1
CAPSULE, EXTENDED RELEASE ORAL
Qty: 90 CAPSULE | Refills: 1 | Status: SHIPPED | OUTPATIENT
Start: 2021-01-06 | End: 2022-05-06 | Stop reason: SDUPTHER

## 2021-01-13 PROBLEM — G89.29 CHRONIC MIDLINE LOW BACK PAIN WITH RIGHT-SIDED SCIATICA: Status: RESOLVED | Noted: 2017-07-18 | Resolved: 2021-01-13

## 2021-01-13 PROBLEM — M54.41 CHRONIC MIDLINE LOW BACK PAIN WITH RIGHT-SIDED SCIATICA: Status: RESOLVED | Noted: 2017-07-18 | Resolved: 2021-01-13

## 2021-01-13 PROBLEM — M48.07 FORAMINAL STENOSIS OF LUMBOSACRAL REGION: Status: RESOLVED | Noted: 2019-09-13 | Resolved: 2021-01-13

## 2021-01-13 PROBLEM — M54.50 CHRONIC LEFT-SIDED LOW BACK PAIN WITHOUT SCIATICA: Status: RESOLVED | Noted: 2017-11-07 | Resolved: 2021-01-13

## 2021-01-13 PROBLEM — G89.29 CHRONIC LEFT-SIDED LOW BACK PAIN WITHOUT SCIATICA: Status: RESOLVED | Noted: 2017-11-07 | Resolved: 2021-01-13

## 2021-03-10 ENCOUNTER — APPOINTMENT (OUTPATIENT)
Dept: CARDIOLOGY | Age: 56
End: 2021-03-10

## 2021-06-11 PROBLEM — G47.33 OBSTRUCTIVE SLEEP APNEA: Status: ACTIVE | Noted: 2021-06-11

## 2021-06-17 PROBLEM — I50.22 CHRONIC SYSTOLIC CHF (CONGESTIVE HEART FAILURE) (HCC): Status: ACTIVE | Noted: 2021-06-17

## 2021-06-25 ENCOUNTER — E-ADVICE (OUTPATIENT)
Dept: CARDIOLOGY | Age: 56
End: 2021-06-25

## 2021-06-25 DIAGNOSIS — I48.0 PAROXYSMAL ATRIAL FIBRILLATION (CMD): Primary | ICD-10-CM

## 2021-08-04 PROBLEM — I50.42 CHRONIC COMBINED SYSTOLIC AND DIASTOLIC CHF, NYHA CLASS 3 (HCC): Status: ACTIVE | Noted: 2021-06-17

## 2021-08-04 PROBLEM — D68.69 SECONDARY HYPERCOAGULABLE STATE (HCC): Status: ACTIVE | Noted: 2021-08-04

## 2021-08-10 PROBLEM — I11.0 HYPERTENSIVE HEART DISEASE WITH CHRONIC COMBINED SYSTOLIC AND DIASTOLIC CONGESTIVE HEART FAILURE (HCC): Status: ACTIVE | Noted: 2021-08-10

## 2021-08-10 PROBLEM — I50.42 HYPERTENSIVE HEART DISEASE WITH CHRONIC COMBINED SYSTOLIC AND DIASTOLIC CONGESTIVE HEART FAILURE (HCC): Status: ACTIVE | Noted: 2021-08-10

## 2021-08-13 ENCOUNTER — HOSPITAL ENCOUNTER (EMERGENCY)
Facility: HOSPITAL | Age: 56
Discharge: HOME | End: 2021-08-13
Attending: EMERGENCY MEDICINE
Payer: MEDICARE

## 2021-08-13 ENCOUNTER — APPOINTMENT (OUTPATIENT)
Dept: GENERAL RADIOLOGY | Facility: HOSPITAL | Age: 56
End: 2021-08-13
Attending: EMERGENCY MEDICINE
Payer: MEDICARE

## 2021-08-13 ENCOUNTER — APPOINTMENT (OUTPATIENT)
Dept: CV DIAGNOSTICS | Facility: HOSPITAL | Age: 56
End: 2021-08-13
Attending: INTERNAL MEDICINE
Payer: MEDICARE

## 2021-08-13 VITALS
HEART RATE: 69 BPM | HEIGHT: 70 IN | OXYGEN SATURATION: 95 % | WEIGHT: 315 LBS | SYSTOLIC BLOOD PRESSURE: 138 MMHG | TEMPERATURE: 98 F | DIASTOLIC BLOOD PRESSURE: 69 MMHG | BODY MASS INDEX: 45.1 KG/M2 | RESPIRATION RATE: 21 BRPM

## 2021-08-13 DIAGNOSIS — I48.91 ATRIAL FIBRILLATION WITH RAPID VENTRICULAR RESPONSE (HCC): Primary | ICD-10-CM

## 2021-08-13 LAB
ALBUMIN SERPL-MCNC: 3.1 G/DL (ref 3.4–5)
ALBUMIN/GLOB SERPL: 0.7 {RATIO} (ref 1–2)
ALP LIVER SERPL-CCNC: 112 U/L
ALT SERPL-CCNC: 39 U/L
ANION GAP SERPL CALC-SCNC: 3 MMOL/L (ref 0–18)
APTT PPP: 33.9 SECONDS (ref 25.4–36.1)
AST SERPL-CCNC: 23 U/L (ref 15–37)
ATRIAL RATE: 147 BPM
BASOPHILS # BLD AUTO: 0.04 X10(3) UL (ref 0–0.2)
BASOPHILS NFR BLD AUTO: 0.5 %
BILIRUB SERPL-MCNC: 0.3 MG/DL (ref 0.1–2)
BUN BLD-MCNC: 11 MG/DL (ref 7–18)
CALCIUM BLD-MCNC: 8.1 MG/DL (ref 8.5–10.1)
CHLORIDE SERPL-SCNC: 110 MMOL/L (ref 98–112)
CO2 SERPL-SCNC: 26 MMOL/L (ref 21–32)
CREAT BLD-MCNC: 1 MG/DL
EOSINOPHIL # BLD AUTO: 0.2 X10(3) UL (ref 0–0.7)
EOSINOPHIL NFR BLD AUTO: 2.6 %
ERYTHROCYTE [DISTWIDTH] IN BLOOD BY AUTOMATED COUNT: 14.3 %
GLOBULIN PLAS-MCNC: 4.3 G/DL (ref 2.8–4.4)
GLUCOSE BLD-MCNC: 137 MG/DL (ref 70–99)
HAV IGM SER QL: 1.9 MG/DL (ref 1.6–2.6)
HCT VFR BLD AUTO: 45.5 %
HGB BLD-MCNC: 14.9 G/DL
IMM GRANULOCYTES # BLD AUTO: 0.03 X10(3) UL (ref 0–1)
IMM GRANULOCYTES NFR BLD: 0.4 %
LYMPHOCYTES # BLD AUTO: 2.14 X10(3) UL (ref 1–4)
LYMPHOCYTES NFR BLD AUTO: 27.9 %
M PROTEIN MFR SERPL ELPH: 7.4 G/DL (ref 6.4–8.2)
MCH RBC QN AUTO: 30 PG (ref 26–34)
MCHC RBC AUTO-ENTMCNC: 32.7 G/DL (ref 31–37)
MCV RBC AUTO: 91.5 FL
MONOCYTES # BLD AUTO: 0.82 X10(3) UL (ref 0.1–1)
MONOCYTES NFR BLD AUTO: 10.7 %
NEUTROPHILS # BLD AUTO: 4.44 X10 (3) UL (ref 1.5–7.7)
NEUTROPHILS # BLD AUTO: 4.44 X10(3) UL (ref 1.5–7.7)
NEUTROPHILS NFR BLD AUTO: 57.9 %
OSMOLALITY SERPL CALC.SUM OF ELEC: 290 MOSM/KG (ref 275–295)
PLATELET # BLD AUTO: 185 10(3)UL (ref 150–450)
POTASSIUM SERPL-SCNC: 3.9 MMOL/L (ref 3.5–5.1)
Q-T INTERVAL: 322 MS
QRS DURATION: 86 MS
QTC CALCULATION (BEZET): 439 MS
R AXIS: -85 DEGREES
RBC # BLD AUTO: 4.97 X10(6)UL
SARS-COV-2 RNA RESP QL NAA+PROBE: NOT DETECTED
SODIUM SERPL-SCNC: 139 MMOL/L (ref 136–145)
T AXIS: -8 DEGREES
TROPONIN I SERPL-MCNC: <0.045 NG/ML (ref ?–0.04)
TSI SER-ACNC: 2.49 MIU/ML (ref 0.36–3.74)
VENTRICULAR RATE: 112 BPM
WBC # BLD AUTO: 7.7 X10(3) UL (ref 4–11)

## 2021-08-13 PROCEDURE — 96366 THER/PROPH/DIAG IV INF ADDON: CPT

## 2021-08-13 PROCEDURE — 96365 THER/PROPH/DIAG IV INF INIT: CPT

## 2021-08-13 PROCEDURE — 85025 COMPLETE CBC W/AUTO DIFF WBC: CPT | Performed by: EMERGENCY MEDICINE

## 2021-08-13 PROCEDURE — 93306 TTE W/DOPPLER COMPLETE: CPT | Performed by: INTERNAL MEDICINE

## 2021-08-13 PROCEDURE — 99285 EMERGENCY DEPT VISIT HI MDM: CPT

## 2021-08-13 PROCEDURE — 84484 ASSAY OF TROPONIN QUANT: CPT | Performed by: EMERGENCY MEDICINE

## 2021-08-13 PROCEDURE — 83735 ASSAY OF MAGNESIUM: CPT | Performed by: EMERGENCY MEDICINE

## 2021-08-13 PROCEDURE — 85730 THROMBOPLASTIN TIME PARTIAL: CPT | Performed by: EMERGENCY MEDICINE

## 2021-08-13 PROCEDURE — 84443 ASSAY THYROID STIM HORMONE: CPT | Performed by: EMERGENCY MEDICINE

## 2021-08-13 PROCEDURE — 96368 THER/DIAG CONCURRENT INF: CPT

## 2021-08-13 PROCEDURE — 80053 COMPREHEN METABOLIC PANEL: CPT | Performed by: EMERGENCY MEDICINE

## 2021-08-13 PROCEDURE — 93010 ELECTROCARDIOGRAM REPORT: CPT

## 2021-08-13 PROCEDURE — 71045 X-RAY EXAM CHEST 1 VIEW: CPT | Performed by: EMERGENCY MEDICINE

## 2021-08-13 PROCEDURE — 93005 ELECTROCARDIOGRAM TRACING: CPT

## 2021-08-13 RX ORDER — METOPROLOL TARTRATE 50 MG/1
50 TABLET, FILM COATED ORAL ONCE
Status: COMPLETED | OUTPATIENT
Start: 2021-08-13 | End: 2021-08-13

## 2021-08-13 RX ORDER — DILTIAZEM HYDROCHLORIDE 240 MG/1
240 CAPSULE, COATED, EXTENDED RELEASE ORAL DAILY
Qty: 30 CAPSULE | Refills: 0 | Status: SHIPPED | OUTPATIENT
Start: 2021-08-13 | End: 2021-09-12

## 2021-08-13 RX ORDER — HEPARIN SODIUM 5000 [USP'U]/ML
5000 INJECTION INTRAVENOUS; SUBCUTANEOUS ONCE
Status: COMPLETED | OUTPATIENT
Start: 2021-08-13 | End: 2021-08-13

## 2021-08-13 RX ORDER — HEPARIN SODIUM AND DEXTROSE 10000; 5 [USP'U]/100ML; G/100ML
1000 INJECTION INTRAVENOUS ONCE
Status: COMPLETED | OUTPATIENT
Start: 2021-08-13 | End: 2021-08-13

## 2021-08-13 RX ORDER — DILTIAZEM HYDROCHLORIDE 240 MG/1
240 CAPSULE, COATED, EXTENDED RELEASE ORAL DAILY
Status: DISCONTINUED | OUTPATIENT
Start: 2021-08-13 | End: 2021-08-13

## 2021-08-13 RX ORDER — ASPIRIN 81 MG/1
324 TABLET, CHEWABLE ORAL ONCE
Status: COMPLETED | OUTPATIENT
Start: 2021-08-13 | End: 2021-08-13

## 2021-08-13 RX ORDER — DILTIAZEM HYDROCHLORIDE 240 MG/1
240 CAPSULE, EXTENDED RELEASE ORAL DAILY
Qty: 90 CAPSULE | Refills: 11 | Status: SHIPPED | OUTPATIENT
Start: 2021-08-13 | End: 2021-08-13

## 2021-08-13 RX ORDER — METOPROLOL SUCCINATE 100 MG/1
100 TABLET, EXTENDED RELEASE ORAL 2 TIMES DAILY
Qty: 60 TABLET | Refills: 0 | Status: SHIPPED | OUTPATIENT
Start: 2021-08-13 | End: 2021-08-13

## 2021-08-13 RX ORDER — METOPROLOL SUCCINATE 100 MG/1
100 TABLET, EXTENDED RELEASE ORAL 2 TIMES DAILY
Qty: 60 TABLET | Refills: 0 | Status: SHIPPED | OUTPATIENT
Start: 2021-08-13 | End: 2021-09-12

## 2021-08-13 RX ORDER — HEPARIN SODIUM AND DEXTROSE 10000; 5 [USP'U]/100ML; G/100ML
INJECTION INTRAVENOUS CONTINUOUS
Status: CANCELLED | OUTPATIENT
Start: 2021-08-13

## 2021-08-13 RX ORDER — METOPROLOL SUCCINATE 100 MG/1
100 TABLET, EXTENDED RELEASE ORAL 2 TIMES DAILY
Qty: 120 TABLET | Refills: 0 | Status: SHIPPED | OUTPATIENT
Start: 2021-08-13 | End: 2021-08-13

## 2021-08-13 RX ORDER — DILTIAZEM HYDROCHLORIDE 240 MG/1
240 CAPSULE, COATED, EXTENDED RELEASE ORAL DAILY
Qty: 30 CAPSULE | Refills: 0 | Status: SHIPPED | OUTPATIENT
Start: 2021-08-13 | End: 2021-08-13

## 2021-08-13 NOTE — ED INITIAL ASSESSMENT (HPI)
A/o x3, pt woke up yesterday with heart palpitations and felt weak, continued all day, also with CP and TORITO hx of a-fib and cardioversion.

## 2021-08-13 NOTE — CM/SW NOTE
Spoke with patient regarding home services. Patient states he was on Medicaid and after 2 years he was switched to [facility]. Medicaid covered home services.   Services have been stopped one month ago until PCP Dr. Lynne Ferguson fills out paperwork (

## 2021-08-13 NOTE — ED PROVIDER NOTES
Patient Seen in: [facility] Emergency Department      History   Patient presents with:  Arrythmia/Palpitations    Stated Complaint: Pt presents to ED stating that he feels like his [facility] is acting up\" stating he*    HPI/Subjective:   HPI    56-yea Maverick Maldonado                Social History    Tobacco Use      Smoking status: Never Smoker      Smokeless tobacco: Never Used    Vaping Use      Vaping Use: Never used    Alcohol use: No      Comment: stopped drinking 2/2018    Drug use:  No tenderness. There is no guarding or rebound. Musculoskeletal:         General: No tenderness or deformity. Normal range of motion. Cervical back: Normal range of motion and neck supple. Lymphadenopathy:      Cervical: No cervical adenopathy.    Ski significant ST depression to suggest acute ischemia main comparison to an EKG from [place] [address] [facility] on January 8, 2021, atrial fibrillation has replaced normal sinus rhythm.            ED Course as of Aug 14 0752  ---------------------------- examination for a patient of this age.                 Dictated by (CST): Forrest Denver, MD on 8/13/2021 at 8:44 AM         Finalized by (CST): Forrest Denver, MD on 8/13/2021 at 8:45 AM                 MDM      Pleasant 58-year-old gentleman who has [phone]  [phone]    Schedule an appointment as soon as possible for a visit on 8/20/2021  appt time: 4pm          Medications Prescribed:  Discharge Medication List as of 8/13/2021  1:46 PM    START taking these medications    !!  Rivaroxaban 20 MG Oral

## 2021-08-13 NOTE — ED QUICK NOTES
Ambulated pt around unit, heart rate up to 112 bpm, mild TORITO, heart rate came down to 80s after sitting down. ER MD informed.

## 2021-08-13 NOTE — CM/SW NOTE
All RXs, including Xarelto, were faxed to [facility] outpatient pharmacy - pt has been on Xarelto previously - patient cleared to DC once medications are brought to room

## 2021-09-03 NOTE — CONSULTS
[facility]  Consultation Note      Primo Barlow Patient Status:  Emergency    1965 MRN [id number]   Location [facility] Attending No att. providers found   Hosp Day # 0 PCP Dhaval Braun MD     Reas reports that he does not drink alcohol and does not use drugs. Allergies  No Known Allergies      Review of Systems:  As per HPI, otherwise 10 point ROS is negative in detail.       Physical Exam:  Blood pressure 138/69, pulse 69, temperature 97.6 °F (3 consideration of cardioversion and antiarrhythmic therapy    Thank you for allowing our practice to participate in the care of your patient. Please do not hesitate to contact me if you have any questions.     Luisito Harris MD  Interventional Cardiology  [facility]

## 2021-09-08 PROBLEM — H93.13 TINNITUS AURIUM, BILATERAL: Status: ACTIVE | Noted: 2021-08-26

## 2021-09-08 PROBLEM — R42 VERTIGO, CONSTANT: Status: ACTIVE | Noted: 2021-09-07

## 2021-09-14 ENCOUNTER — LAB ENCOUNTER (OUTPATIENT)
Dept: LAB | Facility: HOSPITAL | Age: 56
End: 2021-09-14
Attending: INTERNAL MEDICINE
Payer: MEDICARE

## 2021-09-14 DIAGNOSIS — I48.0 PAF (PAROXYSMAL ATRIAL FIBRILLATION) (HCC): ICD-10-CM

## 2021-09-14 LAB
ANION GAP SERPL CALC-SCNC: 4 MMOL/L (ref 0–18)
BUN BLD-MCNC: 16 MG/DL (ref 7–18)
CALCIUM BLD-MCNC: 8.8 MG/DL (ref 8.5–10.1)
CHLORIDE SERPL-SCNC: 110 MMOL/L (ref 98–112)
CO2 SERPL-SCNC: 25 MMOL/L (ref 21–32)
CREAT BLD-MCNC: 0.77 MG/DL
GLUCOSE BLD-MCNC: 112 MG/DL (ref 70–99)
OSMOLALITY SERPL CALC.SUM OF ELEC: 290 MOSM/KG (ref 275–295)
POTASSIUM SERPL-SCNC: 4.6 MMOL/L (ref 3.5–5.1)
SODIUM SERPL-SCNC: 139 MMOL/L (ref 136–145)

## 2021-09-14 PROCEDURE — 80048 BASIC METABOLIC PNL TOTAL CA: CPT

## 2021-09-14 PROCEDURE — 36415 COLL VENOUS BLD VENIPUNCTURE: CPT

## 2021-09-15 LAB — SARS-COV-2_1 BY PCR (AUGUSTUS LAB): NOT DETECTED

## 2021-09-16 ENCOUNTER — ANESTHESIA EVENT (OUTPATIENT)
Dept: INTERVENTIONAL RADIOLOGY/VASCULAR | Facility: HOSPITAL | Age: 56
End: 2021-09-16
Payer: MEDICARE

## 2021-09-17 ENCOUNTER — HOSPITAL ENCOUNTER (OUTPATIENT)
Dept: INTERVENTIONAL RADIOLOGY/VASCULAR | Facility: HOSPITAL | Age: 56
Discharge: HOME | End: 2021-09-17
Attending: INTERNAL MEDICINE | Admitting: INTERNAL MEDICINE
Payer: MEDICARE

## 2021-09-17 ENCOUNTER — ANESTHESIA (OUTPATIENT)
Dept: INTERVENTIONAL RADIOLOGY/VASCULAR | Facility: HOSPITAL | Age: 56
End: 2021-09-17
Payer: MEDICARE

## 2021-09-17 VITALS
RESPIRATION RATE: 27 BRPM | OXYGEN SATURATION: 96 % | HEIGHT: 70 IN | HEART RATE: 69 BPM | TEMPERATURE: 99 F | WEIGHT: 315 LBS | SYSTOLIC BLOOD PRESSURE: 123 MMHG | DIASTOLIC BLOOD PRESSURE: 67 MMHG | BODY MASS INDEX: 45.1 KG/M2

## 2021-09-17 DIAGNOSIS — I48.0 PAF (PAROXYSMAL ATRIAL FIBRILLATION) (HCC): Primary | ICD-10-CM

## 2021-09-17 LAB
ATRIAL RATE: 77 BPM
P AXIS: 49 DEGREES
P-R INTERVAL: 146 MS
Q-T INTERVAL: 398 MS
QRS DURATION: 96 MS
QTC CALCULATION (BEZET): 450 MS
R AXIS: -36 DEGREES
T AXIS: -8 DEGREES
VENTRICULAR RATE: 77 BPM

## 2021-09-17 PROCEDURE — 93010 ELECTROCARDIOGRAM REPORT: CPT | Performed by: INTERNAL MEDICINE

## 2021-09-17 PROCEDURE — 93005 ELECTROCARDIOGRAM TRACING: CPT

## 2021-09-17 PROCEDURE — 5A2204Z RESTORATION OF CARDIAC RHYTHM, SINGLE: ICD-10-PCS | Performed by: INTERNAL MEDICINE

## 2021-09-17 PROCEDURE — 92960 CARDIOVERSION ELECTRIC EXT: CPT

## 2021-09-17 RX ORDER — SODIUM CHLORIDE, SODIUM LACTATE, POTASSIUM CHLORIDE, CALCIUM CHLORIDE 600; 310; 30; 20 MG/100ML; MG/100ML; MG/100ML; MG/100ML
INJECTION, SOLUTION INTRAVENOUS CONTINUOUS
Status: DISCONTINUED | OUTPATIENT
Start: 2021-09-17 | End: 2021-09-17

## 2021-09-17 RX ORDER — SODIUM CHLORIDE, SODIUM LACTATE, POTASSIUM CHLORIDE, CALCIUM CHLORIDE 600; 310; 30; 20 MG/100ML; MG/100ML; MG/100ML; MG/100ML
INJECTION, SOLUTION INTRAVENOUS CONTINUOUS PRN
Status: DISCONTINUED | OUTPATIENT
Start: 2021-09-17 | End: 2021-09-17 | Stop reason: SURG

## 2021-09-17 RX ORDER — LIDOCAINE HYDROCHLORIDE 10 MG/ML
INJECTION, SOLUTION EPIDURAL; INFILTRATION; INTRACAUDAL; PERINEURAL AS NEEDED
Status: DISCONTINUED | OUTPATIENT
Start: 2021-09-17 | End: 2021-09-17 | Stop reason: SURG

## 2021-09-17 RX ORDER — NALOXONE HYDROCHLORIDE 0.4 MG/ML
80 INJECTION, SOLUTION INTRAMUSCULAR; INTRAVENOUS; SUBCUTANEOUS AS NEEDED
Status: DISCONTINUED | OUTPATIENT
Start: 2021-09-17 | End: 2021-09-17

## 2021-09-17 RX ORDER — DILTIAZEM HYDROCHLORIDE 240 MG/1
240 CAPSULE, EXTENDED RELEASE ORAL DAILY
COMMUNITY

## 2021-09-17 RX ORDER — SODIUM CHLORIDE 9 MG/ML
INJECTION, SOLUTION INTRAVENOUS CONTINUOUS
Status: DISCONTINUED | OUTPATIENT
Start: 2021-09-17 | End: 2021-09-17

## 2021-09-17 RX ADMIN — SODIUM CHLORIDE, SODIUM LACTATE, POTASSIUM CHLORIDE, CALCIUM CHLORIDE: 600; 310; 30; 20 INJECTION, SOLUTION INTRAVENOUS at 12:23:00

## 2021-09-17 RX ADMIN — SODIUM CHLORIDE: 9 INJECTION, SOLUTION INTRAVENOUS at 12:30:00

## 2021-09-17 RX ADMIN — LIDOCAINE HYDROCHLORIDE 100 MG: 10 INJECTION, SOLUTION EPIDURAL; INFILTRATION; INTRACAUDAL; PERINEURAL at 12:52:00

## 2021-09-17 NOTE — ANESTHESIA PREPROCEDURE EVALUATION
PRE-OP EVALUATION    Patient Name: Raquel Cespedes    Admit Diagnosis: PA[facility]oxysmal [facility] [I48.0]    Pre-op Diagnosis: * No pre-op diagnosis entered *        Anesthesia Procedure: CATH CARDIOVERSION    * No surgeons found in log * nightly., Disp: 30 tablet, Rfl: 5        Allergies: Patient has no known allergies. Anesthesia Evaluation        Anesthetic Complications           GI/Hepatic/Renal                                 Cardiovascular      ECG reviewed.   Exercise tolerance: and awareness during sedation or MAC anesthesia. Patient understands and consent was obtained.     Plan/risks discussed with: patient                Present on Admission:  **None**

## 2021-09-17 NOTE — ANESTHESIA POSTPROCEDURE EVALUATION
[facility] Patient Status:  Outpatient in a Bed   Age/Gender 61year old male MRN [id number]   Location [facility] Attending Jair Saenz MD   Hosp Day # 0 PCP Clarance Halsted, MD       Anesthesia Post-op N

## 2021-09-17 NOTE — PROCEDURES
[facility]    PATIENT'S NAME: Lavon Irwin   ATTENDING PHYSICIAN: Win Estrada M.D. OPERATING PHYSICIAN: Win Estrada M.D.    PATIENT ACCOUNT#:   [de-identified]    LOCATION:  [place] [address] ED  MEDICAL RECORD #:   [id number]       DATE

## 2021-09-17 NOTE — H&P
ASSESSMENT:       1. Paroxysmal atrial fibrillation, status post cardioversion in May 2020. 2.  Hypertension. 3.  Hyperlipidemia. 4.  Prediabetes. 5.  Increased BMI. Wt 433  6. Alcoholism, last drink January 2020. PLAN:       1.   We have suppor 02/2018, 2020     cardioversion, Dr. Kieran Colon---2020   • OTHER SURGICAL HISTORY   05/07/2019     Cysto-Dr. Lucia Apo             Family History   Problem Relation Age of Onset   • Aldara Father     • No Known Problems Mother          reports that he has neve pain  Gastrointestinal: negative for melena  Genitourinary:negative for hematuria  Hematologic/lymphatic: negative for bleeding  Musculoskeletal:negative for myalgias  Neurological: negative for dizziness and headaches  Endocrine: negative for temperature 0.67 (L)   01/04/2020 0.75   03/09/2019

## 2021-09-17 NOTE — PROGRESS NOTES
Pt received s/p cardioversion with anesthesia. Pt bedrest completed, able to eat and drink without difficulty. Discharge instructions given to pt, all questions answered and pt verbalized understanding.  IV discontinued[facility]

## 2021-10-18 PROBLEM — E66.813 CLASS 3 SEVERE OBESITY DUE TO EXCESS CALORIES WITH SERIOUS COMORBIDITY AND BODY MASS INDEX (BMI) OF 60.0 TO 69.9 IN ADULT (HCC): Status: ACTIVE | Noted: 2021-10-18

## 2021-10-18 PROBLEM — Z51.81 MEDICATION MONITORING ENCOUNTER: Status: ACTIVE | Noted: 2021-10-18

## 2021-10-18 PROBLEM — E66.01 CLASS 3 SEVERE OBESITY DUE TO EXCESS CALORIES WITH SERIOUS COMORBIDITY AND BODY MASS INDEX (BMI) OF 60.0 TO 69.9 IN ADULT (HCC): Status: ACTIVE | Noted: 2021-10-18

## 2021-10-19 ENCOUNTER — LAB ENCOUNTER (OUTPATIENT)
Dept: LAB | Age: 56
End: 2021-10-19
Attending: INTERNAL MEDICINE
Payer: MEDICARE

## 2021-10-19 DIAGNOSIS — I48.0 PAF (PAROXYSMAL ATRIAL FIBRILLATION) (HCC): ICD-10-CM

## 2021-10-19 PROCEDURE — 80048 BASIC METABOLIC PNL TOTAL CA: CPT

## 2021-10-19 PROCEDURE — 36415 COLL VENOUS BLD VENIPUNCTURE: CPT

## 2021-10-19 RX ORDER — RUFINAMIDE 40 MG/ML
1 SUSPENSION ORAL DAILY
COMMUNITY

## 2021-10-21 ENCOUNTER — ANESTHESIA EVENT (OUTPATIENT)
Dept: INTERVENTIONAL RADIOLOGY/VASCULAR | Facility: HOSPITAL | Age: 56
End: 2021-10-21
Payer: MEDICARE

## 2021-10-22 ENCOUNTER — HOSPITAL ENCOUNTER (OUTPATIENT)
Dept: INTERVENTIONAL RADIOLOGY/VASCULAR | Facility: HOSPITAL | Age: 56
Discharge: HOME | End: 2021-10-22
Attending: INTERNAL MEDICINE | Admitting: INTERNAL MEDICINE
Payer: MEDICARE

## 2021-10-22 ENCOUNTER — ANESTHESIA (OUTPATIENT)
Dept: INTERVENTIONAL RADIOLOGY/VASCULAR | Facility: HOSPITAL | Age: 56
End: 2021-10-22
Payer: MEDICARE

## 2021-10-22 VITALS
RESPIRATION RATE: 18 BRPM | SYSTOLIC BLOOD PRESSURE: 135 MMHG | BODY MASS INDEX: 45.1 KG/M2 | WEIGHT: 315 LBS | HEIGHT: 70 IN | OXYGEN SATURATION: 98 % | DIASTOLIC BLOOD PRESSURE: 98 MMHG | TEMPERATURE: 98 F | HEART RATE: 73 BPM

## 2021-10-22 DIAGNOSIS — I48.0 PAF (PAROXYSMAL ATRIAL FIBRILLATION) (HCC): Primary | ICD-10-CM

## 2021-10-22 PROCEDURE — 82962 GLUCOSE BLOOD TEST: CPT

## 2021-10-22 PROCEDURE — 92960 CARDIOVERSION ELECTRIC EXT: CPT

## 2021-10-22 PROCEDURE — 93010 ELECTROCARDIOGRAM REPORT: CPT | Performed by: INTERNAL MEDICINE

## 2021-10-22 PROCEDURE — 93005 ELECTROCARDIOGRAM TRACING: CPT

## 2021-10-22 PROCEDURE — 5A2204Z RESTORATION OF CARDIAC RHYTHM, SINGLE: ICD-10-PCS | Performed by: INTERNAL MEDICINE

## 2021-10-22 RX ORDER — SODIUM CHLORIDE 9 MG/ML
INJECTION, SOLUTION INTRAVENOUS CONTINUOUS
Status: DISCONTINUED | OUTPATIENT
Start: 2021-10-22 | End: 2021-10-22

## 2021-10-22 RX ADMIN — SODIUM CHLORIDE: 9 INJECTION, SOLUTION INTRAVENOUS at 12:32:00

## 2021-10-22 RX ADMIN — SODIUM CHLORIDE: 9 INJECTION, SOLUTION INTRAVENOUS at 11:00:00

## 2021-10-22 NOTE — PROGRESS NOTES
Pt received for bedside cardioversion with anesthesia performed by Dr. Marina Michael. Pt tolerated cardioversion well. See anesthesia flowsheet for details. EKG done post cardioversion. Bedrest completed. Pt able to eat, drink and ambulate without difficulty.  Disch

## 2021-10-22 NOTE — ANESTHESIA POSTPROCEDURE EVALUATION
[facility] Patient Status:  Outpatient in a Bed   Age/Gender 61year old male MRN [id number]   Location [facility] [address] [facility] Attending Lilliam Keys MD   Hosp Day # 0 PCP Simon Teague MD       Anesthesia Post-op N

## 2021-10-22 NOTE — PROCEDURES
[facility]    PATIENT'S NAME: Obdulia Brewster   ATTENDING PHYSICIAN: Evan Rees M.D. OPERATING PHYSICIAN: Evan Rees M.D.    PATIENT ACCOUNT#:   [de-identified]    LOCATION:  [address]  MEDICAL RECORD #:   [id number]       DATE

## 2021-10-22 NOTE — ANESTHESIA PREPROCEDURE EVALUATION
PRE-OP EVALUATION    Patient Name: Kiley Jolley    Admit Diagnosis: [facility]ox[facility] [I48.0]    Pre-op Diagnosis: * No pre-op diagnosis entered *        Anesthesia Procedure: CATH CARDIOVERSION    * No surgeons found in log * Value Date    WBC 7.7 08/13/2021    RBC 4.97 08/13/2021    HGB 14.9 08/13/2021    HCT 45.5 08/13/2021    MCV 91.5 08/13/2021    MCH 30.0 08/13/2021    MCHC 32.7 08/13/2021    RDW 14.3 08/13/2021    .0 08/13/2021     Lab Results   Component Value Ray

## 2021-11-02 PROBLEM — R63.5 WEIGHT GAIN: Status: ACTIVE | Noted: 2021-11-02

## 2021-11-02 PROBLEM — G43.709 CHRONIC MIGRAINE WITHOUT AURA WITHOUT STATUS MIGRAINOSUS, NOT INTRACTABLE: Status: ACTIVE | Noted: 2021-11-02

## 2021-11-02 PROBLEM — G43.809 VESTIBULAR MIGRAINE: Status: ACTIVE | Noted: 2021-11-02

## 2021-11-17 ENCOUNTER — E-ADVICE (OUTPATIENT)
Dept: CARDIOLOGY | Age: 56
End: 2021-11-17

## 2021-11-18 PROBLEM — I50.42 CHRONIC COMBINED SYSTOLIC AND DIASTOLIC CHF, NYHA CLASS 3 (HCC): Status: RESOLVED | Noted: 2021-06-17 | Resolved: 2021-11-18

## 2021-11-18 PROBLEM — I50.42 HYPERTENSIVE HEART DISEASE WITH CHRONIC COMBINED SYSTOLIC AND DIASTOLIC CONGESTIVE HEART FAILURE (HCC): Status: RESOLVED | Noted: 2021-08-10 | Resolved: 2021-11-18

## 2021-11-18 PROBLEM — I11.0 HYPERTENSIVE HEART DISEASE WITH CHRONIC COMBINED SYSTOLIC AND DIASTOLIC CONGESTIVE HEART FAILURE (HCC): Status: RESOLVED | Noted: 2021-08-10 | Resolved: 2021-11-18

## 2021-11-22 NOTE — H&P
ASSESSMENT:       1. Paroxysmal atrial fibrillation, status post cardioversion in May 2020. 9/2021   2. Hypertension. 3.  Hyperlipidemia. 4.  Prediabetes. 5.  Increased BMI. Wt 433  6. Alcoholism, last drink January 2020.  7.    Cath  12/21/18  [facility] • Hypertension     • Morbid obesity ([facility]     • Obstructive sleep apnea     • Prediabetes     • Visual impairment       glasses             Past Surgical History:   Procedure Laterality Date   • APPENDECTOMY       • COLONOSCOPY       • JAW SURGERY     negative for melena  Genitourinary:negative for hematuria  Hematologic/lymphatic: negative for bleeding  Musculoskeletal:negative for myalgias  Neurological: negative for dizziness and headaches  Endocrine: negative for temperature intolerance        PHYSI

## 2021-12-01 ENCOUNTER — HOSPITAL ENCOUNTER (INPATIENT)
Facility: HOSPITAL | Age: 56
LOS: 2 days | Discharge: HOME | DRG: 309 | End: 2021-12-03
Attending: INTERNAL MEDICINE | Admitting: INTERNAL MEDICINE
Payer: MEDICARE

## 2021-12-01 PROBLEM — I48.91 A-FIB (HCC): Status: ACTIVE | Noted: 2021-12-01

## 2021-12-01 PROCEDURE — 83735 ASSAY OF MAGNESIUM: CPT | Performed by: NURSE PRACTITIONER

## 2021-12-01 PROCEDURE — 80048 BASIC METABOLIC PNL TOTAL CA: CPT | Performed by: NURSE PRACTITIONER

## 2021-12-01 PROCEDURE — 93010 ELECTROCARDIOGRAM REPORT: CPT | Performed by: INTERNAL MEDICINE

## 2021-12-01 PROCEDURE — 5A2204Z RESTORATION OF CARDIAC RHYTHM, SINGLE: ICD-10-PCS | Performed by: INTERNAL MEDICINE

## 2021-12-01 PROCEDURE — 94660 CPAP INITIATION&MGMT: CPT

## 2021-12-01 PROCEDURE — 93005 ELECTROCARDIOGRAM TRACING: CPT

## 2021-12-01 RX ORDER — ASPIRIN 81 MG/1
81 TABLET ORAL
Status: DISCONTINUED | OUTPATIENT
Start: 2021-12-01 | End: 2021-12-03

## 2021-12-01 RX ORDER — DOFETILIDE 0.25 MG/1
500 CAPSULE ORAL EVERY 12 HOURS
Status: DISCONTINUED | OUTPATIENT
Start: 2021-12-01 | End: 2021-12-01

## 2021-12-01 RX ORDER — ONDANSETRON 2 MG/ML
4 INJECTION INTRAMUSCULAR; INTRAVENOUS EVERY 6 HOURS PRN
Status: DISCONTINUED | OUTPATIENT
Start: 2021-12-01 | End: 2021-12-01

## 2021-12-01 RX ORDER — PROCHLORPERAZINE EDISYLATE 5 MG/ML
5 INJECTION INTRAMUSCULAR; INTRAVENOUS EVERY 8 HOURS PRN
Status: DISCONTINUED | OUTPATIENT
Start: 2021-12-01 | End: 2021-12-01

## 2021-12-01 RX ORDER — DOFETILIDE 0.25 MG/1
500 CAPSULE ORAL EVERY 12 HOURS
Status: DISCONTINUED | OUTPATIENT
Start: 2021-12-01 | End: 2021-12-03

## 2021-12-01 RX ORDER — ACETAMINOPHEN 325 MG/1
650 TABLET ORAL EVERY 6 HOURS PRN
Status: DISCONTINUED | OUTPATIENT
Start: 2021-12-01 | End: 2021-12-03

## 2021-12-01 RX ORDER — TAMSULOSIN HYDROCHLORIDE 0.4 MG/1
0.4 CAPSULE ORAL DAILY
Status: DISCONTINUED | OUTPATIENT
Start: 2021-12-01 | End: 2021-12-03

## 2021-12-01 RX ORDER — MAGNESIUM OXIDE 400 MG (241.3 MG MAGNESIUM) TABLET
400 TABLET DAILY
Status: DISCONTINUED | OUTPATIENT
Start: 2021-12-01 | End: 2021-12-03

## 2021-12-01 RX ORDER — TRAMADOL HYDROCHLORIDE 50 MG/1
50 TABLET ORAL EVERY 6 HOURS PRN
Refills: 2 | Status: DISCONTINUED | OUTPATIENT
Start: 2021-12-01 | End: 2021-12-03

## 2021-12-01 RX ORDER — METOPROLOL SUCCINATE 100 MG/1
100 TABLET, EXTENDED RELEASE ORAL 2 TIMES DAILY
Status: DISCONTINUED | OUTPATIENT
Start: 2021-12-01 | End: 2021-12-03

## 2021-12-01 NOTE — H&P
[facility]  History and Physical    Javi Forde Patient Status:  Inpatient    1965 MRN [id number]   [facility] 8NE-A Attending Gregory Royal MD   Hosp Day # 0 PCP Roopa Hough MD     History of Present Illness:  Rosy Best and does not use drugs.     Allergies:  No Known Allergies    Medications:    Current Facility-Administered Medications:   •  aspirin EC tab 81 mg, 81 mg, Oral, Daily  •  metoprolol succinate (Toprol XL) 24 hr tab 100 mg, 100 mg, Oral, BID  •  magnesium oxi following abnormal nuclear stress test       Labs:      Lab Results   Component Value Date    INR 1.00 01/04/2020    INR 2.0 (H) 02/08/2019    INR 1.2 12/09/2017       Assessment/Plan:    Persistent Atrial Fibrillation  - s/p DCCV x 3, early recurrence.   -

## 2021-12-01 NOTE — CONSULTS
Tetepanv 55 _ Dofetilide    61year old male for whom pharmacy is dosing Tikosyn   Consult per Samuel Gorman NP. Allergies: has No Known Allergies.     Labs:   Lab Results   Component Value Date/Time    WBC 7.7 08/13/2021 07:52 AM

## 2021-12-01 NOTE — RESPIRATORY THERAPY NOTE
Patient on AYANNA protocol but does not wear CPAP at home. Will monitor patient with pulse ox at night.

## 2021-12-01 NOTE — CM/SW NOTE
Patient starting on tikosyn 500 mcg--call to his [facility] [phone] to check availability of medication--not in stock--?[facility] in [place] may have (phone  [phone])

## 2021-12-01 NOTE — PLAN OF CARE
NURSING ADMISSION NOTE      Patient admitted via Wheelchair  Oriented to room. Safety precautions initiated. Bed in low position. Call light in reach.     Direct admit from Dr Ike Jorgensen for Tikosyn loading  Pt awake,alert and oriented  Denies pain  Afib o

## 2021-12-01 NOTE — H&P
[facility] Hospitalist H&P       CC: No chief complaint on file.        PCP: Rafia Valentino MD    History of Present Illness:55 y/o w hx of afib, chf, depression, htn, hl, selvin admitted for tikosyn loading by EP and plan for CV    Late note entry seen and examined symmetrical, trachea midline, no cervical or supraclavicular lymph adenopathy, thyroid: no enlargment/tenderness/nodules appreciated   Lungs:   Clear to auscultation bilaterally. Normal effort   Chest wall:  No tenderness or deformity.    Heart:  Regular ra

## 2021-12-02 ENCOUNTER — ANESTHESIA EVENT (OUTPATIENT)
Dept: INTERVENTIONAL RADIOLOGY/VASCULAR | Facility: HOSPITAL | Age: 56
DRG: 309 | End: 2021-12-02
Payer: MEDICARE

## 2021-12-02 PROCEDURE — 80053 COMPREHEN METABOLIC PANEL: CPT | Performed by: NURSE PRACTITIONER

## 2021-12-02 PROCEDURE — 93010 ELECTROCARDIOGRAM REPORT: CPT | Performed by: INTERNAL MEDICINE

## 2021-12-02 PROCEDURE — 83735 ASSAY OF MAGNESIUM: CPT | Performed by: NURSE PRACTITIONER

## 2021-12-02 PROCEDURE — 93005 ELECTROCARDIOGRAM TRACING: CPT

## 2021-12-02 PROCEDURE — 85027 COMPLETE CBC AUTOMATED: CPT | Performed by: HOSPITALIST

## 2021-12-02 NOTE — PLAN OF CARE
Assumed pt care at 0730. A&Ox4, glasses. RA/CPAP, denies chest pain/SOB, lung sounds clear, VSS, Afib on tele. Voids. Up ad sahil. POC tikosyn loading, cardioversion tomorrow AM, POC updated with pt, questions answered, verbalized understanding.  Will continu range  Description: INTERVENTIONS:  - Monitor Blood Glucose as ordered  - Assess for signs and symptoms of hyperglycemia and hypoglycemia  - Administer ordered medications to maintain glucose within target range  - Assess barriers to adequate nutritional i

## 2021-12-02 NOTE — ANESTHESIA PREPROCEDURE EVALUATION
PRE-OP EVALUATION    Patient Name: Giovanni Hoff    Admit Diagnosis: PA[facility] ([facility]oxysmal [facility] fibrillation[facility] [I48.[facility]]    Pre-op Diagnosis: * No pre-op diagnosis entered *        Anesthesia Procedure: CATH CARDIOVERSION    * No surgeons found in log * Past Surgical History:   Procedure Laterality Date   • APPENDECTOMY     • COLONOSCOPY     • JAW SURGERY     • OTHER SURGICAL HISTORY  02/2018, 2020    cardioversion, Dr. Nanci Colon---2020   • OTHER SURGICAL HISTORY  05/07/2019    Cysto-Dr. Dianne Ramey proceed.       Plan/risks discussed with: patient                Present on Admission:  **None**

## 2021-12-02 NOTE — CM/SW NOTE
Call placed to [facility] in [place]--phone [phone] to check availability of tikosyn--they have generic of this medication    [facility]'s address  Sha Steele [address] [place] [address]  Fax  [phone] open till 9pm

## 2021-12-02 NOTE — PAYOR COMM NOTE
--------------  ADMISSION REVIEW     Payor: [facility] [address] #:  [id number]  Authorization Number: [id number]    Admit date: 12/1/21  Admit time:  8:16 AM         [facility] Hospitalist H&P     History of Present Illness:55 y/o w hx of afib, atraumatic, no cyanosis or edema. Skin: Skin color, texture, turgor normal. No rashes or lesions.     Neurologic: Normal strength, no focal deficit appreciated     ASSESSMENT / PLAN:   60 y/o w hx of afib, chf, depression, htn, hl, selvin admitted for [facility] gallop. Lungs: Clear without wheezes, rales, rhonchi or dullness. Normal excursions and effort. Abdomen: Soft, non-tender. Extremities: Without clubbing, cyanosis or edema. Peripheral pulses are 2+. Neurologic: Alert and oriented, normal affect.   Sk (ULTRAM) tab 50 mg, 50 mg, Oral, Q6H PRN  acetaminophen (TYLENOL) tab 650 mg, 650 mg, Oral, Q6H PRN  dofetilide (TIKOSYN) cap 500 mcg, 500 mcg, Oral, Q12H        Objective:   Temp: 97.4 °F (36.3 °C)  Pulse: 81  Resp: 16  BP: 124/73    Last 3 Weights  12/01 User    12/2/2021 0101 Given 500 mcg Oral Pau Holloway RN    12/1/2021 1310 Given 500 mcg Oral Rah BARNARD RN      magnesium oxide (MAG-OX) tab 400 mg     Date Action Dose Route User    12/2/2021 0904 Given 400 mg Oral Darling Pedroza, RN      noah

## 2021-12-02 NOTE — PROGRESS NOTES
[facility]  Cardiology Progress Note    Ai Raphael Patient Status:  Inpatient    1965 MRN [id number]   [facility] 8NE-A Attending Tom Kaufman MD   Hosp Day # 1 PCP Padmaja Kemp MD       Subjective: Stable, no CP, dyspnea kg)      Physical Exam:    Telemetry: AF 80-90s, up to 140s at times    General: Alert and oriented in no apparent distress. HEENT: No focal deficits. Neck: No JVD, carotids 2+ no bruits.   Cardiac: irregularly irregular rhythm, S1, S2 normal, no murmur, load, plan for retry DCCV  - Continue metoprolol  - Continue Arnav Freeman MD  Interventional Cardiology  [facility]  Office: [phone]    12/2/2021  10:43 AM

## 2021-12-02 NOTE — RESPIRATORY THERAPY NOTE
AYANNA - Equipment Use Daily Summary:                  . Set Mode: BI-FLEX                . Usage in hours: 5:12                . 90% Pressure (EPAP) level: 6                . 90% Insp. Pressure (IPAP): 16                . AHI: 10.5                .  Supplement

## 2021-12-02 NOTE — PROGRESS NOTES
[facility] Hospitalist Progress Note     CC: Hospital Follow up    PCP: Jaime Ashraf MD       Assessment/Plan:     Active Problems:    A-fib ([address]    60 y/o w hx of afib, chf, depression, htn, hl, selvin admitted for tikosyn loading by EP and plan for CV     Afib: CREATSERUM 0.83 0.77 0.64*  0.64*   GFRAA 113 116 126  126   GFRNAA 98 101 109  109   CA 8.6 9.0 8.6  8.6    139 140  140   K 4.9 4.6 4.2  4.2    107 109  109   CO2 27.0 27.0 24.0  24.0       Recent Labs   Lab 12/02/21  0524   ALT 36   AST 17

## 2021-12-02 NOTE — PLAN OF CARE
Assumed care of pt at [address] [place] [address]. Alert and oriented. VSS. RA while awake - resp fitted for CPAP to wear at night. Tolerated well for a while but removed in the night. No hx of sleep study. No c/o pain. Ambulates.  Appears attentive to his care and interested in Progressing  Goal: Absence of cardiac arrhythmias or at baseline  Description: INTERVENTIONS:  - Continuous cardiac monitoring, monitor vital signs, obtain 12 lead EKG if indicated  - Evaluate effectiveness of antiarrhythmic and heart rate control medicati

## 2021-12-03 ENCOUNTER — ANESTHESIA (OUTPATIENT)
Dept: INTERVENTIONAL RADIOLOGY/VASCULAR | Facility: HOSPITAL | Age: 56
DRG: 309 | End: 2021-12-03
Payer: MEDICARE

## 2021-12-03 VITALS
OXYGEN SATURATION: 96 % | DIASTOLIC BLOOD PRESSURE: 86 MMHG | RESPIRATION RATE: 16 BRPM | HEART RATE: 71 BPM | WEIGHT: 315 LBS | SYSTOLIC BLOOD PRESSURE: 143 MMHG | TEMPERATURE: 98 F | BODY MASS INDEX: 63 KG/M2

## 2021-12-03 PROCEDURE — 76937 US GUIDE VASCULAR ACCESS: CPT

## 2021-12-03 PROCEDURE — 36410 VNPNXR 3YR/> PHY/QHP DX/THER: CPT

## 2021-12-03 PROCEDURE — 93005 ELECTROCARDIOGRAM TRACING: CPT

## 2021-12-03 PROCEDURE — 93010 ELECTROCARDIOGRAM REPORT: CPT | Performed by: INTERNAL MEDICINE

## 2021-12-03 PROCEDURE — 83735 ASSAY OF MAGNESIUM: CPT | Performed by: NURSE PRACTITIONER

## 2021-12-03 PROCEDURE — 80048 BASIC METABOLIC PNL TOTAL CA: CPT | Performed by: NURSE PRACTITIONER

## 2021-12-03 RX ORDER — LIDOCAINE HYDROCHLORIDE 10 MG/ML
INJECTION, SOLUTION EPIDURAL; INFILTRATION; INTRACAUDAL; PERINEURAL AS NEEDED
Status: DISCONTINUED | OUTPATIENT
Start: 2021-12-03 | End: 2021-12-03 | Stop reason: SURG

## 2021-12-03 RX ORDER — DOFETILIDE 0.5 MG/1
500 CAPSULE ORAL EVERY 12 HOURS
Qty: 60 CAPSULE | Refills: 1 | Status: SHIPPED | OUTPATIENT
Start: 2021-12-03 | End: 2021-12-06

## 2021-12-03 RX ORDER — SODIUM CHLORIDE 9 MG/ML
INJECTION, SOLUTION INTRAVENOUS CONTINUOUS PRN
Status: DISCONTINUED | OUTPATIENT
Start: 2021-12-03 | End: 2021-12-03 | Stop reason: SURG

## 2021-12-03 RX ADMIN — LIDOCAINE HYDROCHLORIDE 50 MG: 10 INJECTION, SOLUTION EPIDURAL; INFILTRATION; INTRACAUDAL; PERINEURAL at 12:41:00

## 2021-12-03 RX ADMIN — SODIUM CHLORIDE: 9 INJECTION, SOLUTION INTRAVENOUS at 12:37:00

## 2021-12-03 NOTE — PLAN OF CARE
Rec in bed pt and VS appear stable. Denies CP/SOB sat maintained in 90's on RA. Pt without IV access PICC notified Left EIV palced. Cath lab RN notified will send for pt. Scheduled for cardioversion. Rec from cath pt and VS appear stable.  AOX3. sats shantell

## 2021-12-03 NOTE — PROCEDURES
[facility]    PATIENT'S NAME: Madan Gold   ATTENDING PHYSICIAN: Kishan Kincaid M.D. OPERATING PHYSICIAN: Kishan Kincaid M.D.    PATIENT ACCOUNT#:   [de-identified]    LOCATION:  [address] [place] [address]  MEDICAL RECORD #:   [id number]       DATE OF BIRTH:

## 2021-12-03 NOTE — PROGRESS NOTES
[facility]  Cardiology Progress Note    Pardeep Gonsales Patient Status:  Inpatient    1965 MRN [id number]   [facility] 8NE-A Attending Russell Whiting MD   Hosp Day # 2 PCP Waldemar Goodman MD       Subjective: Stable, no CP, dyspnea kg)      Physical Exam:    Telemetry: AF 80-90s, up to 140s at times    General: Alert and oriented in no apparent distress. HEENT: No focal deficits. Neck: No JVD, carotids 2+ no bruits.   Cardiac: irregularly irregular rhythm, S1, S2 normal, no murmur, protocol, follow QTc post dose, this morning repeat ECG QTc<500  - If still in AF after load, plan for retry DCCV today  - Continue metoprolol  - Continue Xarelto  -  Pt thinking of gastric sleeve   - pt voiced understanding og proarrhythmia

## 2021-12-03 NOTE — ANESTHESIA POSTPROCEDURE EVALUATION
[address] [facility] Patient Status:  Inpatient   Age/Gender 61year old male MRN [id number]   [facility] 8NE-A Attending Gene Carr MD   [address] Day # 2 PCP Alana Palafox MD       Anesthesia Post-op Note        Procedure [facility]

## 2021-12-03 NOTE — DIETARY NOTE
C[address]     Admitting diagnosis:  Visit for monitoring Tikosyn therapy [Z51.81, Z79.899]    Ht:  5'9\"  Wt: (!) 192.3 kg (423 lb 15.1 oz). Body mass index is 62.61 kg/m².      Labs/Meds reviewed    Diet: Or

## 2021-12-03 NOTE — PLAN OF CARE
Patient had successful cardioversion with Dr. Aquilino Carmona and with anesthesia, Dr. Mara Muñoz. Patient received one 360 joule synchronized shock and converted to NSR. EKG completed. Patient tolerated procedure. VSS. Patient A/O x3, ITZEL. [facility]

## 2021-12-03 NOTE — PLAN OF CARE
Assumed care at [address]. Alert and oriented. VSS. RA - BiPAP at night. Afib. Tikosyn loading - last dose 0115 - QTc 481. NPO at midnight for cardioversion today. Bed in lowest position, call light in reach, all questions answered. Will continue to monitor. heart rate control medications as ordered  - Initiate emergency measures for life threatening arrhythmias  - Monitor electrolytes and administer replacement therapy as ordered  12/3/2021 0515 by Loni Leiva RN  Outcome: Progressing  12/3/2021 0515 by

## 2021-12-03 NOTE — ANESTHESIA PROCEDURE NOTES
Airway  Date/Time: 12/3/2021 12:45 PM  Urgency: elective      General Information and Staff    Patient location during procedure: cath lab  Anesthesiologist: Yazan Nur MD  Performed: anesthesiologist     Indications and Patient Condition  Spo

## 2021-12-03 NOTE — PROGRESS NOTES
[facility] Hospitalist Progress Note     CC: Hospital Follow up    PCP: Stephanie Jaquez MD       Assessment/Plan:     Active Problems:    A-fib ([address]    62 y/o w hx of afib, chf, depression, htn, hl, selvin admitted for tikosyn loading by EP and plan for CV     Afib: 19* 13  13 12   CREATSERUM 0.77 0.64*  0.64* 0.61*   GFRAA 116 126  126 128   GFRNAA 101 109  109 111   CA 9.0 8.6  8.6 8.6    140  140 138   K 4.6 4.2  4.2 4.5    109  109 108   CO2 27.0 24.0  24.0 25.0       Recent Labs   Lab 12/02/21  0524

## 2021-12-06 NOTE — PAYOR COMM NOTE
Discharge Notification    Patient Name: Venessa Landaverde: [facility] #: [id number]  Authorization Number: [id number]  Admit Date/Time: 12/1/2021 8:16 AM  Discharge Date/Time: 12/3/2021 6:52 PM

## 2021-12-08 ENCOUNTER — LAB ENCOUNTER (OUTPATIENT)
Dept: LAB | Facility: HOSPITAL | Age: 56
End: 2021-12-08
Attending: INTERNAL MEDICINE
Payer: MEDICARE

## 2021-12-08 ENCOUNTER — ORDER TRANSCRIPTION (OUTPATIENT)
Dept: SLEEP CENTER | Age: 56
End: 2021-12-08

## 2021-12-08 DIAGNOSIS — Z01.818 PREOP EXAMINATION: Primary | ICD-10-CM

## 2021-12-08 DIAGNOSIS — Z11.59 SCREENING FOR VIRAL DISEASE: ICD-10-CM

## 2021-12-08 DIAGNOSIS — Z01.818 PREOP EXAMINATION: ICD-10-CM

## 2021-12-09 ENCOUNTER — LAB ENCOUNTER (OUTPATIENT)
Dept: LAB | Facility: HOSPITAL | Age: 56
End: 2021-12-09
Attending: INTERNAL MEDICINE
Payer: MEDICARE

## 2021-12-09 DIAGNOSIS — Z11.59 SCREENING FOR VIRAL DISEASE: ICD-10-CM

## 2021-12-09 DIAGNOSIS — Z01.818 PREOP EXAMINATION: ICD-10-CM

## 2021-12-11 ENCOUNTER — OFFICE VISIT (OUTPATIENT)
Dept: SLEEP CENTER | Age: 56
End: 2021-12-11
Attending: INTERNAL MEDICINE
Payer: MEDICARE

## 2021-12-11 DIAGNOSIS — R06.83 SNORING: ICD-10-CM

## 2021-12-11 DIAGNOSIS — E66.01 CLASS 3 SEVERE OBESITY WITH SERIOUS COMORBIDITY AND BODY MASS INDEX (BMI) OF 60.0 TO 69.9 IN ADULT, UNSPECIFIED OBESITY TYPE (HCC): ICD-10-CM

## 2021-12-11 DIAGNOSIS — G47.10 HYPERSOMNIA, ORGANIC: ICD-10-CM

## 2021-12-11 PROCEDURE — 95811 POLYSOM 6/>YRS CPAP 4/> PARM: CPT

## 2021-12-14 ENCOUNTER — OFFICE VISIT (OUTPATIENT)
Dept: CARDIOLOGY | Age: 56
End: 2021-12-14

## 2021-12-14 VITALS
SYSTOLIC BLOOD PRESSURE: 128 MMHG | HEART RATE: 87 BPM | DIASTOLIC BLOOD PRESSURE: 80 MMHG | BODY MASS INDEX: 62.32 KG/M2 | WEIGHT: 315 LBS

## 2021-12-14 DIAGNOSIS — I48.19 PERSISTENT ATRIAL FIBRILLATION (CMD): Primary | ICD-10-CM

## 2021-12-14 PROCEDURE — 99215 OFFICE O/P EST HI 40 MIN: CPT | Performed by: INTERNAL MEDICINE

## 2021-12-14 PROCEDURE — 3079F DIAST BP 80-89 MM HG: CPT | Performed by: INTERNAL MEDICINE

## 2021-12-14 PROCEDURE — 3074F SYST BP LT 130 MM HG: CPT | Performed by: INTERNAL MEDICINE

## 2021-12-14 RX ORDER — TRAMADOL HYDROCHLORIDE 100 MG/1
100 TABLET, EXTENDED RELEASE ORAL
COMMUNITY
Start: 2021-11-02

## 2021-12-14 RX ORDER — METOPROLOL SUCCINATE 100 MG/1
100 TABLET, EXTENDED RELEASE ORAL
COMMUNITY
Start: 2021-09-23 | End: 2022-02-21 | Stop reason: SDUPTHER

## 2021-12-14 RX ORDER — AMIODARONE HYDROCHLORIDE 200 MG/1
200 TABLET ORAL DAILY
Qty: 90 TABLET | Refills: 6 | Status: SHIPPED | OUTPATIENT
Start: 2021-12-21 | End: 2022-02-21 | Stop reason: SDUPTHER

## 2021-12-16 ENCOUNTER — E-ADVICE (OUTPATIENT)
Dept: CARDIOLOGY | Age: 56
End: 2021-12-16

## 2021-12-16 NOTE — PROGRESS NOTES
Follow up regarding this sleep study will be coordinated through my practice  See his TE from a few days prior. Tell him I reviewed the study and the CPAP worked wonderfully to control his sleep disordered breathing.   No need for BiPAP unless he becomes i

## 2021-12-18 NOTE — DISCHARGE SUMMARY
[facility]    PATIENT'S NAME: Caio Cook   ATTENDING PHYSICIAN: Kingsley Salinas M.D.    PATIENT ACCOUNT#:   [de-identified]    LOCATION:  [address] [place] [address]  MEDICAL RECORD #:   [id number]       YOB: 1965  ADMISSION DATE:       12/01/20

## 2021-12-20 DIAGNOSIS — I48.19 PERSISTENT ATRIAL FIBRILLATION (CMD): ICD-10-CM

## 2021-12-20 PROCEDURE — 93000 ELECTROCARDIOGRAM COMPLETE: CPT | Performed by: INTERNAL MEDICINE

## 2021-12-23 ENCOUNTER — TELEPHONE (OUTPATIENT)
Dept: CARDIOLOGY | Age: 56
End: 2021-12-23

## 2021-12-27 ENCOUNTER — DOCUMENTATION (OUTPATIENT)
Dept: CARDIOLOGY | Age: 56
End: 2021-12-27

## 2021-12-27 ENCOUNTER — PREP FOR CASE (OUTPATIENT)
Dept: CARDIOLOGY | Age: 56
End: 2021-12-27

## 2021-12-27 DIAGNOSIS — I48.19 PERSISTENT ATRIAL FIBRILLATION (CMD): Primary | ICD-10-CM

## 2021-12-27 RX ORDER — SODIUM CHLORIDE 9 MG/ML
INJECTION, SOLUTION INTRAVENOUS CONTINUOUS
Status: CANCELLED | OUTPATIENT
Start: 2021-12-27

## 2021-12-30 PROBLEM — I10 PRIMARY HYPERTENSION: Status: ACTIVE | Noted: 2021-12-30

## 2021-12-31 ENCOUNTER — E-ADVICE (OUTPATIENT)
Dept: CARDIOLOGY | Age: 56
End: 2021-12-31

## 2021-12-31 DIAGNOSIS — I48.11 LONGSTANDING PERSISTENT ATRIAL FIBRILLATION (CMD): ICD-10-CM

## 2021-12-31 DIAGNOSIS — I10 HYPERTENSION, BENIGN: Primary | ICD-10-CM

## 2021-12-31 DIAGNOSIS — E78.2 MIXED HYPERLIPIDEMIA: ICD-10-CM

## 2022-01-11 ENCOUNTER — APPOINTMENT (OUTPATIENT)
Dept: LAB | Age: 57
End: 2022-01-11

## 2022-01-13 ENCOUNTER — LAB ENCOUNTER (OUTPATIENT)
Dept: LAB | Facility: HOSPITAL | Age: 57
End: 2022-01-13
Attending: INTERNAL MEDICINE
Payer: MEDICARE

## 2022-01-13 DIAGNOSIS — Z11.59 SCREENING FOR VIRAL DISEASE: ICD-10-CM

## 2022-01-13 DIAGNOSIS — Z01.818 PREOP EXAMINATION: ICD-10-CM

## 2022-01-14 LAB — SARS-COV-2 RNA RESP QL NAA+PROBE: NOT DETECTED

## 2022-01-16 ENCOUNTER — APPOINTMENT (OUTPATIENT)
Dept: SLEEP CENTER | Age: 57
End: 2022-01-16
Attending: INTERNAL MEDICINE
Payer: MEDICARE

## 2022-01-16 DIAGNOSIS — G47.33 OSA (OBSTRUCTIVE SLEEP APNEA): ICD-10-CM

## 2022-02-09 DIAGNOSIS — Z01.812 PRE-PROCEDURE LAB EXAM: Primary | ICD-10-CM

## 2022-02-21 RX ORDER — METOPROLOL SUCCINATE 100 MG/1
100 TABLET, EXTENDED RELEASE ORAL DAILY
Qty: 90 TABLET | Refills: 3 | Status: SHIPPED | OUTPATIENT
Start: 2022-02-21 | End: 2022-10-25 | Stop reason: SDUPTHER

## 2022-02-21 RX ORDER — AMIODARONE HYDROCHLORIDE 200 MG/1
200 TABLET ORAL DAILY
Qty: 90 TABLET | Refills: 1 | Status: SHIPPED | OUTPATIENT
Start: 2022-02-21 | End: 2022-05-06

## 2022-02-21 RX ORDER — DILTIAZEM HYDROCHLORIDE EXTENDED-RELEASE TABLETS 240 MG/1
240 TABLET, EXTENDED RELEASE ORAL DAILY
Qty: 90 TABLET | Refills: 3 | Status: SHIPPED | OUTPATIENT
Start: 2022-02-21 | End: 2022-02-22 | Stop reason: ALTCHOICE

## 2022-02-22 RX ORDER — DILTIAZEM HYDROCHLORIDE 240 MG/1
240 CAPSULE, COATED, EXTENDED RELEASE ORAL DAILY
Qty: 90 CAPSULE | Refills: 3 | Status: SHIPPED | OUTPATIENT
Start: 2022-02-22 | End: 2022-09-07 | Stop reason: DRUGHIGH

## 2022-02-25 ENCOUNTER — E-ADVICE (OUTPATIENT)
Dept: CARDIOLOGY | Age: 57
End: 2022-02-25

## 2022-03-03 ENCOUNTER — APPOINTMENT (OUTPATIENT)
Dept: SLEEP MEDICINE | Age: 57
End: 2022-03-03
Attending: INTERNAL MEDICINE

## 2022-03-05 ENCOUNTER — APPOINTMENT (OUTPATIENT)
Dept: LAB | Age: 57
End: 2022-03-05

## 2022-03-07 ENCOUNTER — APPOINTMENT (OUTPATIENT)
Dept: CARDIOLOGY | Age: 57
End: 2022-03-07
Attending: INTERNAL MEDICINE

## 2022-03-11 ENCOUNTER — PREP FOR CASE (OUTPATIENT)
Dept: CARDIOLOGY | Age: 57
End: 2022-03-11

## 2022-03-11 ENCOUNTER — DOCUMENTATION (OUTPATIENT)
Dept: CARDIOLOGY | Age: 57
End: 2022-03-11

## 2022-03-11 DIAGNOSIS — I48.91 ATRIAL FIBRILLATION, UNSPECIFIED TYPE (CMD): Primary | ICD-10-CM

## 2022-03-11 RX ORDER — SODIUM CHLORIDE 9 MG/ML
INJECTION, SOLUTION INTRAVENOUS CONTINUOUS
Status: CANCELLED | OUTPATIENT
Start: 2022-03-11

## 2022-03-28 PROBLEM — I50.32 CHRONIC DIASTOLIC CHF (CONGESTIVE HEART FAILURE) (HCC): Status: ACTIVE | Noted: 2021-06-17

## 2022-03-28 PROBLEM — Z51.81 MEDICATION MONITORING ENCOUNTER: Status: RESOLVED | Noted: 2021-10-18 | Resolved: 2022-03-28

## 2022-03-28 PROBLEM — R42 VERTIGO, CONSTANT: Status: RESOLVED | Noted: 2021-09-07 | Resolved: 2022-03-28

## 2022-03-28 PROBLEM — R63.5 WEIGHT GAIN: Status: RESOLVED | Noted: 2021-11-02 | Resolved: 2022-03-28

## 2022-03-28 PROBLEM — R73.03 PREDIABETES: Status: RESOLVED | Noted: 2020-08-20 | Resolved: 2022-03-28

## 2022-03-28 PROBLEM — F41.9 ANXIETY: Status: RESOLVED | Noted: 2020-02-27 | Resolved: 2022-03-28

## 2022-03-28 PROBLEM — H93.13 TINNITUS AURIUM, BILATERAL: Status: RESOLVED | Noted: 2021-08-26 | Resolved: 2022-03-28

## 2022-03-28 PROBLEM — G43.709 CHRONIC MIGRAINE WITHOUT AURA WITHOUT STATUS MIGRAINOSUS, NOT INTRACTABLE: Status: RESOLVED | Noted: 2021-11-02 | Resolved: 2022-03-28

## 2022-03-29 ENCOUNTER — TELEPHONE (OUTPATIENT)
Dept: CARDIOLOGY | Age: 57
End: 2022-03-29

## 2022-03-30 ENCOUNTER — E-ADVICE (OUTPATIENT)
Dept: CARDIOLOGY | Age: 57
End: 2022-03-30

## 2022-04-04 DIAGNOSIS — Z01.812 PRE-PROCEDURE LAB EXAM: Primary | ICD-10-CM

## 2022-04-21 DIAGNOSIS — Z01.812 PRE-PROCEDURE LAB EXAM: Primary | ICD-10-CM

## 2022-04-25 ENCOUNTER — E-ADVICE (OUTPATIENT)
Dept: CARDIOLOGY | Age: 57
End: 2022-04-25

## 2022-04-30 ENCOUNTER — APPOINTMENT (OUTPATIENT)
Dept: LAB | Age: 57
End: 2022-04-30

## 2022-05-02 ENCOUNTER — APPOINTMENT (OUTPATIENT)
Dept: CARDIOLOGY | Age: 57
End: 2022-05-02
Attending: INTERNAL MEDICINE

## 2022-05-06 ENCOUNTER — OFFICE VISIT (OUTPATIENT)
Dept: CARDIOLOGY | Age: 57
End: 2022-05-06

## 2022-05-06 VITALS — BODY MASS INDEX: 63.94 KG/M2 | WEIGHT: 315 LBS | DIASTOLIC BLOOD PRESSURE: 78 MMHG | SYSTOLIC BLOOD PRESSURE: 113 MMHG

## 2022-05-06 DIAGNOSIS — I48.11 LONGSTANDING PERSISTENT ATRIAL FIBRILLATION (CMD): Primary | ICD-10-CM

## 2022-05-06 PROCEDURE — 99215 OFFICE O/P EST HI 40 MIN: CPT | Performed by: INTERNAL MEDICINE

## 2022-05-06 PROCEDURE — 3074F SYST BP LT 130 MM HG: CPT | Performed by: INTERNAL MEDICINE

## 2022-05-06 PROCEDURE — 3078F DIAST BP <80 MM HG: CPT | Performed by: INTERNAL MEDICINE

## 2022-05-06 PROCEDURE — 93000 ELECTROCARDIOGRAM COMPLETE: CPT | Performed by: INTERNAL MEDICINE

## 2022-05-06 RX ORDER — AMIODARONE HYDROCHLORIDE 200 MG/1
200 TABLET ORAL 2 TIMES DAILY
Qty: 60 TABLET | Refills: 11 | Status: SHIPPED | OUTPATIENT
Start: 2022-05-06 | End: 2022-05-13

## 2022-05-06 SDOH — HEALTH STABILITY: PHYSICAL HEALTH: ON AVERAGE, HOW MANY DAYS PER WEEK DO YOU ENGAGE IN MODERATE TO STRENUOUS EXERCISE (LIKE A BRISK WALK)?: 2 DAYS

## 2022-05-06 ASSESSMENT — PATIENT HEALTH QUESTIONNAIRE - PHQ9
2. FEELING DOWN, DEPRESSED OR HOPELESS: NOT AT ALL
SUM OF ALL RESPONSES TO PHQ9 QUESTIONS 1 AND 2: 0
CLINICAL INTERPRETATION OF PHQ2 SCORE: NO FURTHER SCREENING NEEDED
SUM OF ALL RESPONSES TO PHQ9 QUESTIONS 1 AND 2: 0
1. LITTLE INTEREST OR PLEASURE IN DOING THINGS: NOT AT ALL

## 2022-05-07 ENCOUNTER — LAB SERVICES (OUTPATIENT)
Dept: LAB | Age: 57
End: 2022-05-07

## 2022-05-07 DIAGNOSIS — Z01.812 PRE-PROCEDURE LAB EXAM: ICD-10-CM

## 2022-05-07 PROCEDURE — U0005 INFEC AGEN DETEC AMPLI PROBE: HCPCS | Performed by: INTERNAL MEDICINE

## 2022-05-07 PROCEDURE — U0003 INFECTIOUS AGENT DETECTION BY NUCLEIC ACID (DNA OR RNA); SEVERE ACUTE RESPIRATORY SYNDROME CORONAVIRUS 2 (SARS-COV-2) (CORONAVIRUS DISEASE [COVID-19]), AMPLIFIED PROBE TECHNIQUE, MAKING USE OF HIGH THROUGHPUT TECHNOLOGIES AS DESCRIBED BY CMS-2020-01-R: HCPCS | Performed by: INTERNAL MEDICINE

## 2022-05-08 LAB
SARS-COV-2 RNA RESP QL NAA+PROBE: NOT DETECTED
SERVICE CMNT-IMP: NORMAL
SERVICE CMNT-IMP: NORMAL

## 2022-05-09 ENCOUNTER — TELEPHONE (OUTPATIENT)
Dept: SLEEP MEDICINE | Age: 57
End: 2022-05-09

## 2022-05-12 ENCOUNTER — TELEPHONE (OUTPATIENT)
Dept: SLEEP MEDICINE | Age: 57
End: 2022-05-12

## 2022-05-13 RX ORDER — RIVAROXABAN 20 MG/1
TABLET, FILM COATED ORAL
Qty: 90 TABLET | Refills: 3 | Status: SHIPPED | OUTPATIENT
Start: 2022-05-13 | End: 2022-10-25 | Stop reason: SDUPTHER

## 2022-05-13 RX ORDER — AMIODARONE HYDROCHLORIDE 200 MG/1
TABLET ORAL
Qty: 90 TABLET | Refills: 3 | Status: SHIPPED | OUTPATIENT
Start: 2022-05-13 | End: 2022-05-20

## 2022-05-16 ENCOUNTER — LAB SERVICES (OUTPATIENT)
Dept: LAB | Age: 57
End: 2022-05-16

## 2022-05-16 DIAGNOSIS — I48.91 ATRIAL FIBRILLATION, UNSPECIFIED TYPE (CMD): ICD-10-CM

## 2022-05-16 PROCEDURE — U0003 INFECTIOUS AGENT DETECTION BY NUCLEIC ACID (DNA OR RNA); SEVERE ACUTE RESPIRATORY SYNDROME CORONAVIRUS 2 (SARS-COV-2) (CORONAVIRUS DISEASE [COVID-19]), AMPLIFIED PROBE TECHNIQUE, MAKING USE OF HIGH THROUGHPUT TECHNOLOGIES AS DESCRIBED BY CMS-2020-01-R: HCPCS | Performed by: INTERNAL MEDICINE

## 2022-05-16 PROCEDURE — U0005 INFEC AGEN DETEC AMPLI PROBE: HCPCS | Performed by: INTERNAL MEDICINE

## 2022-05-17 LAB
SARS-COV-2 RNA RESP QL NAA+PROBE: NOT DETECTED
SERVICE CMNT-IMP: NORMAL
SERVICE CMNT-IMP: NORMAL

## 2022-05-18 ENCOUNTER — OFFICE VISIT (OUTPATIENT)
Dept: SLEEP MEDICINE | Age: 57
End: 2022-05-18
Attending: PEDIATRICS

## 2022-05-18 DIAGNOSIS — G47.33 OBSTRUCTIVE SLEEP APNEA (ADULT) (PEDIATRIC): ICD-10-CM

## 2022-05-18 LAB — REPORT TEXT: NORMAL

## 2022-05-18 PROCEDURE — 95811 POLYSOM 6/>YRS CPAP 4/> PARM: CPT | Performed by: SPECIALIST

## 2022-05-19 ENCOUNTER — TELEPHONE (OUTPATIENT)
Dept: CARDIOLOGY | Age: 57
End: 2022-05-19

## 2022-05-20 ENCOUNTER — TELEPHONE (OUTPATIENT)
Dept: CARDIOLOGY | Age: 57
End: 2022-05-20

## 2022-06-03 ENCOUNTER — HOSPITAL ENCOUNTER (OUTPATIENT)
Age: 57
End: 2022-06-03
Attending: INTERNAL MEDICINE | Admitting: INTERNAL MEDICINE

## 2022-06-03 ENCOUNTER — DOCUMENTATION (OUTPATIENT)
Dept: CARDIOLOGY | Age: 57
End: 2022-06-03

## 2022-06-03 ENCOUNTER — PREP FOR CASE (OUTPATIENT)
Dept: CARDIOLOGY | Age: 57
End: 2022-06-03

## 2022-06-03 DIAGNOSIS — I48.0 PAROXYSMAL ATRIAL FIBRILLATION (CMD): Primary | ICD-10-CM

## 2022-06-03 RX ORDER — SODIUM CHLORIDE 9 MG/ML
INJECTION, SOLUTION INTRAVENOUS CONTINUOUS
Status: CANCELLED | OUTPATIENT
Start: 2022-06-03

## 2022-07-29 ENCOUNTER — TELEPHONE (OUTPATIENT)
Dept: CARDIOLOGY | Age: 57
End: 2022-07-29

## 2022-08-04 ENCOUNTER — HOSPITAL ENCOUNTER (OUTPATIENT)
Dept: CARDIOLOGY | Age: 57
End: 2022-08-04
Attending: INTERNAL MEDICINE

## 2022-08-31 ENCOUNTER — APPOINTMENT (OUTPATIENT)
Dept: CARDIOLOGY | Age: 57
End: 2022-08-31

## 2022-09-01 ENCOUNTER — TELEPHONE (OUTPATIENT)
Dept: CARDIOLOGY | Age: 57
End: 2022-09-01

## 2022-09-01 DIAGNOSIS — I10 HYPERTENSION, BENIGN: ICD-10-CM

## 2022-09-01 DIAGNOSIS — I48.0 PAROXYSMAL ATRIAL FIBRILLATION (CMD): Primary | ICD-10-CM

## 2022-09-01 DIAGNOSIS — E78.2 MIXED HYPERLIPIDEMIA: ICD-10-CM

## 2022-09-07 ENCOUNTER — OFFICE VISIT (OUTPATIENT)
Dept: CARDIOLOGY | Age: 57
End: 2022-09-07

## 2022-09-07 VITALS — DIASTOLIC BLOOD PRESSURE: 90 MMHG | BODY MASS INDEX: 64.39 KG/M2 | SYSTOLIC BLOOD PRESSURE: 119 MMHG | WEIGHT: 315 LBS

## 2022-09-07 DIAGNOSIS — I48.11 LONGSTANDING PERSISTENT ATRIAL FIBRILLATION (CMD): Primary | ICD-10-CM

## 2022-09-07 PROCEDURE — 99215 OFFICE O/P EST HI 40 MIN: CPT | Performed by: INTERNAL MEDICINE

## 2022-09-07 PROCEDURE — 3074F SYST BP LT 130 MM HG: CPT | Performed by: INTERNAL MEDICINE

## 2022-09-07 PROCEDURE — 93000 ELECTROCARDIOGRAM COMPLETE: CPT | Performed by: INTERNAL MEDICINE

## 2022-09-07 RX ORDER — DILTIAZEM HYDROCHLORIDE 360 MG/1
360 CAPSULE, EXTENDED RELEASE ORAL DAILY
Qty: 30 CAPSULE | Refills: 5 | Status: SHIPPED | OUTPATIENT
Start: 2022-09-07 | End: 2022-10-25 | Stop reason: SDUPTHER

## 2022-09-16 ENCOUNTER — E-ADVICE (OUTPATIENT)
Dept: CARDIOLOGY | Age: 57
End: 2022-09-16

## 2022-09-16 DIAGNOSIS — R00.1 BRADYCARDIA, UNSPECIFIED: Primary | ICD-10-CM

## 2022-09-22 ENCOUNTER — ANCILLARY PROCEDURE (OUTPATIENT)
Dept: CARDIOLOGY | Age: 57
End: 2022-09-22
Attending: INTERNAL MEDICINE

## 2022-09-22 DIAGNOSIS — I48.11 LONGSTANDING PERSISTENT ATRIAL FIBRILLATION (CMD): ICD-10-CM

## 2022-09-26 PROCEDURE — 93227 XTRNL ECG REC<48 HR R&I: CPT | Performed by: INTERNAL MEDICINE

## 2022-10-05 ENCOUNTER — E-ADVICE (OUTPATIENT)
Dept: CARDIOLOGY | Age: 57
End: 2022-10-05

## 2022-10-21 ENCOUNTER — APPOINTMENT (OUTPATIENT)
Dept: CARDIOLOGY | Age: 57
End: 2022-10-21

## 2022-10-25 RX ORDER — DILTIAZEM HYDROCHLORIDE 360 MG/1
360 CAPSULE, EXTENDED RELEASE ORAL DAILY
Qty: 90 CAPSULE | Refills: 1 | Status: SHIPPED | OUTPATIENT
Start: 2022-10-25 | End: 2023-02-10 | Stop reason: SDUPTHER

## 2022-10-25 RX ORDER — METOPROLOL SUCCINATE 100 MG/1
100 TABLET, EXTENDED RELEASE ORAL DAILY
Qty: 90 TABLET | Refills: 1 | Status: SHIPPED | OUTPATIENT
Start: 2022-10-25 | End: 2023-03-14 | Stop reason: SDUPTHER

## 2022-10-27 RX ORDER — ESCITALOPRAM OXALATE 20 MG/1
20 TABLET ORAL DAILY
COMMUNITY
Start: 2022-10-17

## 2022-10-27 RX ORDER — BUPROPION HYDROCHLORIDE 300 MG/1
1 TABLET ORAL DAILY
COMMUNITY
Start: 2022-09-29 | End: 2023-09-20 | Stop reason: ALTCHOICE

## 2022-10-27 RX ORDER — DIVALPROEX SODIUM 500 MG/1
TABLET, EXTENDED RELEASE ORAL
COMMUNITY
Start: 2022-08-31 | End: 2023-09-20 | Stop reason: ALTCHOICE

## 2022-10-27 RX ORDER — ONDANSETRON 4 MG/1
TABLET, FILM COATED ORAL
COMMUNITY
Start: 2022-10-03

## 2022-10-27 RX ORDER — ARIPIPRAZOLE 5 MG/1
7.5 TABLET ORAL DAILY
COMMUNITY
Start: 2022-10-17 | End: 2023-09-19

## 2022-10-27 RX ORDER — MIRTAZAPINE 15 MG/1
TABLET, FILM COATED ORAL
COMMUNITY
Start: 2022-07-10

## 2022-10-27 RX ORDER — VENLAFAXINE HYDROCHLORIDE 37.5 MG/1
112.5 CAPSULE, EXTENDED RELEASE ORAL
COMMUNITY
Start: 2022-09-29 | End: 2023-09-20 | Stop reason: ALTCHOICE

## 2022-10-28 ENCOUNTER — APPOINTMENT (OUTPATIENT)
Dept: CARDIOLOGY | Age: 57
End: 2022-10-28

## 2022-11-11 ENCOUNTER — APPOINTMENT (OUTPATIENT)
Dept: CARDIOLOGY | Age: 57
End: 2022-11-11

## 2022-11-29 ENCOUNTER — APPOINTMENT (OUTPATIENT)
Dept: GENERAL RADIOLOGY | Facility: HOSPITAL | Age: 57
End: 2022-11-29
Payer: MEDICARE

## 2022-11-29 ENCOUNTER — HOSPITAL ENCOUNTER (EMERGENCY)
Facility: HOSPITAL | Age: 57
Discharge: HOME | End: 2022-11-29
Attending: STUDENT IN AN ORGANIZED HEALTH CARE EDUCATION/TRAINING PROGRAM
Payer: MEDICARE

## 2022-11-29 VITALS
SYSTOLIC BLOOD PRESSURE: 124 MMHG | HEART RATE: 69 BPM | DIASTOLIC BLOOD PRESSURE: 90 MMHG | OXYGEN SATURATION: 93 % | RESPIRATION RATE: 24 BRPM | TEMPERATURE: 98 F

## 2022-11-29 DIAGNOSIS — R07.9 CHEST PAIN OF UNCERTAIN ETIOLOGY: Primary | ICD-10-CM

## 2022-11-29 LAB
ALBUMIN SERPL-MCNC: 3.2 G/DL (ref 3.4–5)
ALBUMIN/GLOB SERPL: 0.8 {RATIO} (ref 1–2)
ALP LIVER SERPL-CCNC: 74 U/L
ALT SERPL-CCNC: 44 U/L
ANION GAP SERPL CALC-SCNC: 7 MMOL/L (ref 0–18)
AST SERPL-CCNC: 29 U/L (ref 15–37)
BASOPHILS # BLD AUTO: 0.04 X10(3) UL (ref 0–0.2)
BASOPHILS NFR BLD AUTO: 0.5 %
BILIRUB SERPL-MCNC: 0.7 MG/DL (ref 0.1–2)
BUN BLD-MCNC: 18 MG/DL (ref 7–18)
BUN/CREAT SERPL: 18.6 (ref 10–20)
CALCIUM BLD-MCNC: 8.2 MG/DL (ref 8.5–10.1)
CHLORIDE SERPL-SCNC: 106 MMOL/L (ref 98–112)
CO2 SERPL-SCNC: 25 MMOL/L (ref 21–32)
CREAT BLD-MCNC: 0.97 MG/DL
DEPRECATED RDW RBC AUTO: 47.7 FL (ref 35.1–46.3)
EOSINOPHIL # BLD AUTO: 0.16 X10(3) UL (ref 0–0.7)
EOSINOPHIL NFR BLD AUTO: 2 %
ERYTHROCYTE [DISTWIDTH] IN BLOOD BY AUTOMATED COUNT: 13.9 % (ref 11–15)
GFR SERPLBLD BASED ON 1.73 SQ M-ARVRAT: 90 ML/MIN/1.73M2 (ref 60–?)
GLOBULIN PLAS-MCNC: 4 G/DL (ref 2.8–4.4)
GLUCOSE BLD-MCNC: 98 MG/DL (ref 70–99)
HCT VFR BLD AUTO: 45.1 %
HGB BLD-MCNC: 15 G/DL
IMM GRANULOCYTES # BLD AUTO: 0.02 X10(3) UL (ref 0–1)
IMM GRANULOCYTES NFR BLD: 0.2 %
LYMPHOCYTES # BLD AUTO: 1.91 X10(3) UL (ref 1–4)
LYMPHOCYTES NFR BLD AUTO: 23.8 %
MCH RBC QN AUTO: 31 PG (ref 26–34)
MCHC RBC AUTO-ENTMCNC: 33.3 G/DL (ref 31–37)
MCV RBC AUTO: 93.2 FL
MONOCYTES # BLD AUTO: 0.94 X10(3) UL (ref 0.1–1)
MONOCYTES NFR BLD AUTO: 11.7 %
NEUTROPHILS # BLD AUTO: 4.97 X10 (3) UL (ref 1.5–7.7)
NEUTROPHILS # BLD AUTO: 4.97 X10(3) UL (ref 1.5–7.7)
NEUTROPHILS NFR BLD AUTO: 61.8 %
NT-PROBNP SERPL-MCNC: 299 PG/ML (ref ?–125)
OSMOLALITY SERPL CALC.SUM OF ELEC: 288 MOSM/KG (ref 275–295)
PLATELET # BLD AUTO: 173 10(3)UL (ref 150–450)
POTASSIUM SERPL-SCNC: 4.3 MMOL/L (ref 3.5–5.1)
PROT SERPL-MCNC: 7.2 G/DL (ref 6.4–8.2)
Q-T INTERVAL: 340 MS
QRS DURATION: 96 MS
QTC CALCULATION (BEZET): 455 MS
R AXIS: 245 DEGREES
RBC # BLD AUTO: 4.84 X10(6)UL
SODIUM SERPL-SCNC: 138 MMOL/L (ref 136–145)
T AXIS: -11 DEGREES
TROPONIN I HIGH SENSITIVITY: 10 NG/L
TROPONIN I HIGH SENSITIVITY: 9 NG/L
VENTRICULAR RATE: 108 BPM
WBC # BLD AUTO: 8 X10(3) UL (ref 4–11)

## 2022-11-29 PROCEDURE — 99285 EMERGENCY DEPT VISIT HI MDM: CPT

## 2022-11-29 PROCEDURE — 85025 COMPLETE CBC W/AUTO DIFF WBC: CPT | Performed by: STUDENT IN AN ORGANIZED HEALTH CARE EDUCATION/TRAINING PROGRAM

## 2022-11-29 PROCEDURE — 80053 COMPREHEN METABOLIC PANEL: CPT | Performed by: STUDENT IN AN ORGANIZED HEALTH CARE EDUCATION/TRAINING PROGRAM

## 2022-11-29 PROCEDURE — 84484 ASSAY OF TROPONIN QUANT: CPT | Performed by: STUDENT IN AN ORGANIZED HEALTH CARE EDUCATION/TRAINING PROGRAM

## 2022-11-29 PROCEDURE — 93010 ELECTROCARDIOGRAM REPORT: CPT | Performed by: STUDENT IN AN ORGANIZED HEALTH CARE EDUCATION/TRAINING PROGRAM

## 2022-11-29 PROCEDURE — 71045 X-RAY EXAM CHEST 1 VIEW: CPT | Performed by: STUDENT IN AN ORGANIZED HEALTH CARE EDUCATION/TRAINING PROGRAM

## 2022-11-29 PROCEDURE — 93005 ELECTROCARDIOGRAM TRACING: CPT

## 2022-11-29 PROCEDURE — 83880 ASSAY OF NATRIURETIC PEPTIDE: CPT | Performed by: STUDENT IN AN ORGANIZED HEALTH CARE EDUCATION/TRAINING PROGRAM

## 2022-11-29 PROCEDURE — 36415 COLL VENOUS BLD VENIPUNCTURE: CPT

## 2022-11-29 NOTE — ED INITIAL ASSESSMENT (HPI)
PATIENT AOX3 TO ED VIA PRIVATE VEHICLE CO OF CHEST PAIN WITH TORITO WITH EXERTION X FEW DAYS HX OF AFIB

## 2022-11-29 NOTE — ED QUICK NOTES
Patient presents with:  Chest Pain    Patient aox3 to ed via private  Vehicle patient co of chest pain with gris especially on exertion x few days hx of afib patient stated was at cardiologist x yesterday for same thing and has cardioversion scheduled x 12/16, patient admits to being non compliant with his amiodarone stating \"it made my kidneys hurt\". Patient changed into gown on nibp cardiac and spo2 monitors.  Side railsx2 call light within reach

## 2022-12-01 VITALS — BODY MASS INDEX: 45.1 KG/M2 | HEIGHT: 70 IN | WEIGHT: 315 LBS

## 2022-12-01 RX ORDER — SODIUM CHLORIDE 9 MG/ML
INJECTION, SOLUTION INTRAVENOUS CONTINUOUS
OUTPATIENT
Start: 2022-12-01

## 2022-12-16 ENCOUNTER — HOSPITAL ENCOUNTER (OUTPATIENT)
Dept: INTERVENTIONAL RADIOLOGY/VASCULAR | Facility: HOSPITAL | Age: 57
Discharge: HOME | End: 2022-12-16
Attending: INTERNAL MEDICINE
Payer: MEDICARE

## 2022-12-16 ENCOUNTER — APPOINTMENT (OUTPATIENT)
Dept: CV DIAGNOSTICS | Facility: HOSPITAL | Age: 57
End: 2022-12-16
Attending: INTERNAL MEDICINE
Payer: MEDICARE

## 2022-12-16 DIAGNOSIS — Z01.818 PRE-OP TESTING: Primary | ICD-10-CM

## 2023-02-10 ENCOUNTER — TELEPHONE (OUTPATIENT)
Dept: CARDIOLOGY | Age: 58
End: 2023-02-10

## 2023-02-10 RX ORDER — DILTIAZEM HYDROCHLORIDE 360 MG/1
360 CAPSULE, EXTENDED RELEASE ORAL DAILY
Qty: 90 CAPSULE | Refills: 1 | Status: SHIPPED | OUTPATIENT
Start: 2023-02-10 | End: 2023-03-14 | Stop reason: SDUPTHER

## 2023-03-14 ENCOUNTER — TELEPHONE (OUTPATIENT)
Dept: CARDIOLOGY | Age: 58
End: 2023-03-14

## 2023-03-15 RX ORDER — DILTIAZEM HYDROCHLORIDE 360 MG/1
360 CAPSULE, EXTENDED RELEASE ORAL DAILY
Qty: 90 CAPSULE | Refills: 0 | Status: SHIPPED | OUTPATIENT
Start: 2023-03-15 | End: 2023-09-06

## 2023-03-15 RX ORDER — METOPROLOL SUCCINATE 100 MG/1
100 TABLET, EXTENDED RELEASE ORAL DAILY
Qty: 90 TABLET | Refills: 0 | Status: SHIPPED | OUTPATIENT
Start: 2023-03-15 | End: 2023-06-27

## 2023-04-07 ENCOUNTER — HOSPITAL ENCOUNTER (INPATIENT)
Facility: HOSPITAL | Age: 58
LOS: 2 days | Discharge: HOME | End: 2023-04-09
Attending: EMERGENCY MEDICINE | Admitting: INTERNAL MEDICINE
Payer: MEDICARE

## 2023-04-07 ENCOUNTER — APPOINTMENT (OUTPATIENT)
Dept: CV DIAGNOSTICS | Facility: HOSPITAL | Age: 58
End: 2023-04-07
Attending: INTERNAL MEDICINE
Payer: MEDICARE

## 2023-04-07 ENCOUNTER — APPOINTMENT (OUTPATIENT)
Dept: GENERAL RADIOLOGY | Facility: HOSPITAL | Age: 58
End: 2023-04-07
Attending: EMERGENCY MEDICINE
Payer: MEDICARE

## 2023-04-07 DIAGNOSIS — R07.9 ACUTE CHEST PAIN: Primary | ICD-10-CM

## 2023-04-07 DIAGNOSIS — R06.09 DOE (DYSPNEA ON EXERTION): ICD-10-CM

## 2023-04-07 LAB
ALBUMIN SERPL-MCNC: 3.3 G/DL (ref 3.4–5)
ALBUMIN/GLOB SERPL: 0.8 {RATIO} (ref 1–2)
ALP LIVER SERPL-CCNC: 90 U/L
ALT SERPL-CCNC: 34 U/L
ANION GAP SERPL CALC-SCNC: 7 MMOL/L (ref 0–18)
APTT PPP: 32 SECONDS (ref 23.3–35.6)
AST SERPL-CCNC: 35 U/L (ref 15–37)
BASOPHILS # BLD AUTO: 0.02 X10(3) UL (ref 0–0.2)
BASOPHILS NFR BLD AUTO: 0.4 %
BILIRUB SERPL-MCNC: 0.5 MG/DL (ref 0.1–2)
BUN BLD-MCNC: 13 MG/DL (ref 7–18)
CALCIUM BLD-MCNC: 8.8 MG/DL (ref 8.5–10.1)
CHLORIDE SERPL-SCNC: 110 MMOL/L (ref 98–112)
CO2 SERPL-SCNC: 23 MMOL/L (ref 21–32)
CREAT BLD-MCNC: 0.94 MG/DL
EOSINOPHIL # BLD AUTO: 0.14 X10(3) UL (ref 0–0.7)
EOSINOPHIL NFR BLD AUTO: 2.8 %
ERYTHROCYTE [DISTWIDTH] IN BLOOD BY AUTOMATED COUNT: 14.6 %
GFR SERPLBLD BASED ON 1.73 SQ M-ARVRAT: 93 ML/MIN/1.73M2 (ref 60–?)
GLOBULIN PLAS-MCNC: 4 G/DL (ref 2.8–4.4)
GLUCOSE BLD-MCNC: 121 MG/DL (ref 70–99)
HCT VFR BLD AUTO: 46.7 %
HGB BLD-MCNC: 15.9 G/DL
IMM GRANULOCYTES # BLD AUTO: 0.02 X10(3) UL (ref 0–1)
IMM GRANULOCYTES NFR BLD: 0.4 %
INR BLD: 1.1 (ref 0.85–1.16)
LIPASE SERPL-CCNC: 57 U/L (ref 13–75)
LYMPHOCYTES # BLD AUTO: 1.34 X10(3) UL (ref 1–4)
LYMPHOCYTES NFR BLD AUTO: 26.7 %
MCH RBC QN AUTO: 31.4 PG (ref 26–34)
MCHC RBC AUTO-ENTMCNC: 34 G/DL (ref 31–37)
MCV RBC AUTO: 92.3 FL
MONOCYTES # BLD AUTO: 0.47 X10(3) UL (ref 0.1–1)
MONOCYTES NFR BLD AUTO: 9.4 %
NEUTROPHILS # BLD AUTO: 3.03 X10 (3) UL (ref 1.5–7.7)
NEUTROPHILS # BLD AUTO: 3.03 X10(3) UL (ref 1.5–7.7)
NEUTROPHILS NFR BLD AUTO: 60.3 %
NT-PROBNP SERPL-MCNC: 529 PG/ML (ref ?–125)
OSMOLALITY SERPL CALC.SUM OF ELEC: 291 MOSM/KG (ref 275–295)
PLATELET # BLD AUTO: 179 10(3)UL (ref 150–450)
POTASSIUM SERPL-SCNC: 4.3 MMOL/L (ref 3.5–5.1)
PROT SERPL-MCNC: 7.3 G/DL (ref 6.4–8.2)
PROTHROMBIN TIME: 14.2 SECONDS (ref 11.6–14.8)
Q-T INTERVAL: 348 MS
QRS DURATION: 92 MS
QTC CALCULATION (BEZET): 459 MS
R AXIS: 260 DEGREES
RBC # BLD AUTO: 5.06 X10(6)UL
SARS-COV-2 RNA RESP QL NAA+PROBE: NOT DETECTED
SODIUM SERPL-SCNC: 140 MMOL/L (ref 136–145)
T AXIS: -7 DEGREES
TROPONIN I HIGH SENSITIVITY: 10 NG/L
TROPONIN I HIGH SENSITIVITY: 10 NG/L
TROPONIN I HIGH SENSITIVITY: 12 NG/L
VENTRICULAR RATE: 105 BPM
WBC # BLD AUTO: 5 X10(3) UL (ref 4–11)

## 2023-04-07 PROCEDURE — 83690 ASSAY OF LIPASE: CPT | Performed by: EMERGENCY MEDICINE

## 2023-04-07 PROCEDURE — 85025 COMPLETE CBC W/AUTO DIFF WBC: CPT | Performed by: EMERGENCY MEDICINE

## 2023-04-07 PROCEDURE — 80053 COMPREHEN METABOLIC PANEL: CPT | Performed by: EMERGENCY MEDICINE

## 2023-04-07 PROCEDURE — 84484 ASSAY OF TROPONIN QUANT: CPT | Performed by: HOSPITALIST

## 2023-04-07 PROCEDURE — 93306 TTE W/DOPPLER COMPLETE: CPT | Performed by: INTERNAL MEDICINE

## 2023-04-07 PROCEDURE — 84484 ASSAY OF TROPONIN QUANT: CPT | Performed by: EMERGENCY MEDICINE

## 2023-04-07 PROCEDURE — 83880 ASSAY OF NATRIURETIC PEPTIDE: CPT | Performed by: EMERGENCY MEDICINE

## 2023-04-07 PROCEDURE — 93010 ELECTROCARDIOGRAM REPORT: CPT

## 2023-04-07 PROCEDURE — 93005 ELECTROCARDIOGRAM TRACING: CPT

## 2023-04-07 PROCEDURE — 99285 EMERGENCY DEPT VISIT HI MDM: CPT

## 2023-04-07 PROCEDURE — 36415 COLL VENOUS BLD VENIPUNCTURE: CPT

## 2023-04-07 PROCEDURE — 85610 PROTHROMBIN TIME: CPT | Performed by: EMERGENCY MEDICINE

## 2023-04-07 PROCEDURE — 85730 THROMBOPLASTIN TIME PARTIAL: CPT | Performed by: EMERGENCY MEDICINE

## 2023-04-07 PROCEDURE — 71045 X-RAY EXAM CHEST 1 VIEW: CPT | Performed by: EMERGENCY MEDICINE

## 2023-04-07 RX ORDER — HYDROCODONE BITARTRATE AND ACETAMINOPHEN 5; 325 MG/1; MG/1
2 TABLET ORAL EVERY 4 HOURS PRN
Status: DISCONTINUED | OUTPATIENT
Start: 2023-04-07 | End: 2023-04-09

## 2023-04-07 RX ORDER — DILTIAZEM HYDROCHLORIDE 180 MG/1
360 CAPSULE, EXTENDED RELEASE ORAL DAILY
Status: DISCONTINUED | OUTPATIENT
Start: 2023-04-07 | End: 2023-04-09

## 2023-04-07 RX ORDER — ASPIRIN 325 MG
325 TABLET ORAL DAILY
Status: DISCONTINUED | OUTPATIENT
Start: 2023-04-07 | End: 2023-04-09

## 2023-04-07 RX ORDER — SODIUM PHOSPHATE, DIBASIC AND SODIUM PHOSPHATE, MONOBASIC 7; 19 G/133ML; G/133ML
1 ENEMA RECTAL ONCE AS NEEDED
Status: DISCONTINUED | OUTPATIENT
Start: 2023-04-07 | End: 2023-04-09

## 2023-04-07 RX ORDER — POLYETHYLENE GLYCOL 3350 17 G/17G
17 POWDER, FOR SOLUTION ORAL DAILY PRN
Status: DISCONTINUED | OUTPATIENT
Start: 2023-04-07 | End: 2023-04-09

## 2023-04-07 RX ORDER — BISACODYL 10 MG
10 SUPPOSITORY, RECTAL RECTAL
Status: DISCONTINUED | OUTPATIENT
Start: 2023-04-07 | End: 2023-04-09

## 2023-04-07 RX ORDER — HYDROCODONE BITARTRATE AND ACETAMINOPHEN 5; 325 MG/1; MG/1
1 TABLET ORAL EVERY 4 HOURS PRN
Status: DISCONTINUED | OUTPATIENT
Start: 2023-04-07 | End: 2023-04-09

## 2023-04-07 RX ORDER — ONDANSETRON 2 MG/ML
4 INJECTION INTRAMUSCULAR; INTRAVENOUS EVERY 6 HOURS PRN
Status: DISCONTINUED | OUTPATIENT
Start: 2023-04-07 | End: 2023-04-09

## 2023-04-07 RX ORDER — FUROSEMIDE 10 MG/ML
40 INJECTION INTRAMUSCULAR; INTRAVENOUS ONCE
Status: COMPLETED | OUTPATIENT
Start: 2023-04-07 | End: 2023-04-07

## 2023-04-07 RX ORDER — SENNOSIDES 8.6 MG
17.2 TABLET ORAL NIGHTLY PRN
Status: DISCONTINUED | OUTPATIENT
Start: 2023-04-07 | End: 2023-04-09

## 2023-04-07 RX ORDER — ACETAMINOPHEN 325 MG/1
650 TABLET ORAL EVERY 4 HOURS PRN
Status: DISCONTINUED | OUTPATIENT
Start: 2023-04-07 | End: 2023-04-09

## 2023-04-07 RX ORDER — PROCHLORPERAZINE EDISYLATE 5 MG/ML
5 INJECTION INTRAMUSCULAR; INTRAVENOUS EVERY 8 HOURS PRN
Status: DISCONTINUED | OUTPATIENT
Start: 2023-04-07 | End: 2023-04-09

## 2023-04-07 RX ORDER — METOPROLOL SUCCINATE 100 MG/1
100 TABLET, EXTENDED RELEASE ORAL 2 TIMES DAILY
Status: DISCONTINUED | OUTPATIENT
Start: 2023-04-07 | End: 2023-04-09

## 2023-04-07 RX ORDER — NITROGLYCERIN 0.4 MG/1
0.4 TABLET SUBLINGUAL EVERY 5 MIN PRN
Status: DISCONTINUED | OUTPATIENT
Start: 2023-04-07 | End: 2023-04-09

## 2023-04-07 NOTE — PROGRESS NOTES
04/07/23 1155 04/07/23 1157 04/07/23 1200   Vital Signs   BP (!) 157/96 160/82 (!) 153/109   MAP (mmHg) 110 103 120   BP Location Right arm  --   --    BP Method Automatic  --   --    Patient Position Lying Sitting Standing

## 2023-04-07 NOTE — PLAN OF CARE
Assumed care of patient from ED @ 1200. Patient is up independently, A&O x4 no complaints of pain. Patient is afib rhythm with controlled rates at rest. Rates 120-140s with ambulation, MD aware. Denies chest pain at this time. Patient aware to make RN aware of chest pain in the event it occurs. On room air, lung sounds diminished, Non pitting edema to bilateral lower extremities. Patient continent of bladder and bowel, last bm prior to admission. Fall precautions in place, call light and belongings within reach, patient and family updated on plan of care.          Problem: Patient/Family Goals  Goal: Patient/Family Long Term Goal  Description: Patient's Long Term Goal: TO go home     Interventions:  - Monitor I &O Monitor lytes, monitor cardiac rhythm   - See additional Care Plan goals for specific interventions  4/7/2023 1604 by Janice Macias RN  Outcome: Progressing  4/7/2023 1604 by Janice Macias RN  Outcome: Progressing  Goal: Patient/Family Short Term Goal  Description: Patient's Short Term Goal: To go home     Interventions:   - Monitor I&O, cardiac rhythm, monitor labs   - See additional Care Plan goals for specific interventions  4/7/2023 1604 by Janice Macias RN  Outcome: Progressing  4/7/2023 1604 by Janice Macias RN  Outcome: Progressing

## 2023-04-08 ENCOUNTER — APPOINTMENT (OUTPATIENT)
Dept: CV DIAGNOSTICS | Facility: HOSPITAL | Age: 58
End: 2023-04-08
Attending: HOSPITALIST
Payer: MEDICARE

## 2023-04-08 LAB
ALBUMIN SERPL-MCNC: 3.3 G/DL (ref 3.4–5)
ALBUMIN/GLOB SERPL: 0.8 {RATIO} (ref 1–2)
ALP LIVER SERPL-CCNC: 88 U/L
ALT SERPL-CCNC: 35 U/L
ANION GAP SERPL CALC-SCNC: 6 MMOL/L (ref 0–18)
AST SERPL-CCNC: 23 U/L (ref 15–37)
BASOPHILS # BLD AUTO: 0.03 X10(3) UL (ref 0–0.2)
BASOPHILS NFR BLD AUTO: 0.5 %
BILIRUB SERPL-MCNC: 0.4 MG/DL (ref 0.1–2)
BUN BLD-MCNC: 16 MG/DL (ref 7–18)
CALCIUM BLD-MCNC: 9.1 MG/DL (ref 8.5–10.1)
CHLORIDE SERPL-SCNC: 109 MMOL/L (ref 98–112)
CHOLEST SERPL-MCNC: 152 MG/DL (ref ?–200)
CO2 SERPL-SCNC: 25 MMOL/L (ref 21–32)
CREAT BLD-MCNC: 0.89 MG/DL
EOSINOPHIL # BLD AUTO: 0.16 X10(3) UL (ref 0–0.7)
EOSINOPHIL NFR BLD AUTO: 2.6 %
ERYTHROCYTE [DISTWIDTH] IN BLOOD BY AUTOMATED COUNT: 14.3 %
GFR SERPLBLD BASED ON 1.73 SQ M-ARVRAT: 99 ML/MIN/1.73M2 (ref 60–?)
GLOBULIN PLAS-MCNC: 3.9 G/DL (ref 2.8–4.4)
GLUCOSE BLD-MCNC: 106 MG/DL (ref 70–99)
HCT VFR BLD AUTO: 47.3 %
HDLC SERPL-MCNC: 45 MG/DL (ref 40–59)
HGB BLD-MCNC: 15.8 G/DL
IMM GRANULOCYTES # BLD AUTO: 0.02 X10(3) UL (ref 0–1)
IMM GRANULOCYTES NFR BLD: 0.3 %
INR BLD: 1.33 (ref 0.85–1.16)
LDLC SERPL CALC-MCNC: 91 MG/DL (ref ?–100)
LYMPHOCYTES # BLD AUTO: 1.87 X10(3) UL (ref 1–4)
LYMPHOCYTES NFR BLD AUTO: 30.1 %
MAGNESIUM SERPL-MCNC: 1.8 MG/DL (ref 1.6–2.6)
MCH RBC QN AUTO: 30.9 PG (ref 26–34)
MCHC RBC AUTO-ENTMCNC: 33.4 G/DL (ref 31–37)
MCV RBC AUTO: 92.4 FL
MONOCYTES # BLD AUTO: 0.84 X10(3) UL (ref 0.1–1)
MONOCYTES NFR BLD AUTO: 13.5 %
NEUTROPHILS # BLD AUTO: 3.29 X10 (3) UL (ref 1.5–7.7)
NEUTROPHILS # BLD AUTO: 3.29 X10(3) UL (ref 1.5–7.7)
NEUTROPHILS NFR BLD AUTO: 53 %
NONHDLC SERPL-MCNC: 107 MG/DL (ref ?–130)
OSMOLALITY SERPL CALC.SUM OF ELEC: 292 MOSM/KG (ref 275–295)
PLATELET # BLD AUTO: 184 10(3)UL (ref 150–450)
POTASSIUM SERPL-SCNC: 4 MMOL/L (ref 3.5–5.1)
PROT SERPL-MCNC: 7.2 G/DL (ref 6.4–8.2)
PROTHROMBIN TIME: 16.4 SECONDS (ref 11.6–14.8)
RBC # BLD AUTO: 5.12 X10(6)UL
SODIUM SERPL-SCNC: 140 MMOL/L (ref 136–145)
TRIGL SERPL-MCNC: 82 MG/DL (ref 30–149)
VLDLC SERPL CALC-MCNC: 13 MG/DL (ref 0–30)
WBC # BLD AUTO: 6.2 X10(3) UL (ref 4–11)

## 2023-04-08 PROCEDURE — 80053 COMPREHEN METABOLIC PANEL: CPT | Performed by: HOSPITALIST

## 2023-04-08 PROCEDURE — 97116 GAIT TRAINING THERAPY: CPT

## 2023-04-08 PROCEDURE — 97535 SELF CARE MNGMENT TRAINING: CPT

## 2023-04-08 PROCEDURE — 85610 PROTHROMBIN TIME: CPT | Performed by: HOSPITALIST

## 2023-04-08 PROCEDURE — 97165 OT EVAL LOW COMPLEX 30 MIN: CPT

## 2023-04-08 PROCEDURE — 85025 COMPLETE CBC W/AUTO DIFF WBC: CPT | Performed by: HOSPITALIST

## 2023-04-08 PROCEDURE — 94660 CPAP INITIATION&MGMT: CPT

## 2023-04-08 PROCEDURE — 83735 ASSAY OF MAGNESIUM: CPT | Performed by: HOSPITALIST

## 2023-04-08 PROCEDURE — 80061 LIPID PANEL: CPT | Performed by: HOSPITALIST

## 2023-04-08 PROCEDURE — 97161 PT EVAL LOW COMPLEX 20 MIN: CPT

## 2023-04-08 RX ORDER — FUROSEMIDE 10 MG/ML
40 INJECTION INTRAMUSCULAR; INTRAVENOUS
Status: DISCONTINUED | OUTPATIENT
Start: 2023-04-08 | End: 2023-04-08

## 2023-04-08 RX ORDER — FUROSEMIDE 10 MG/ML
40 INJECTION INTRAMUSCULAR; INTRAVENOUS DAILY
Status: DISCONTINUED | OUTPATIENT
Start: 2023-04-08 | End: 2023-04-09

## 2023-04-08 RX ORDER — FUROSEMIDE 20 MG/1
20 TABLET ORAL DAILY
Qty: 30 TABLET | Refills: 0 | Status: SHIPPED | OUTPATIENT
Start: 2023-04-08

## 2023-04-08 RX ORDER — MAGNESIUM OXIDE 400 MG/1
400 TABLET ORAL ONCE
Status: COMPLETED | OUTPATIENT
Start: 2023-04-08 | End: 2023-04-08

## 2023-04-09 VITALS
BODY MASS INDEX: 44.1 KG/M2 | HEIGHT: 71 IN | SYSTOLIC BLOOD PRESSURE: 122 MMHG | DIASTOLIC BLOOD PRESSURE: 63 MMHG | WEIGHT: 315 LBS | TEMPERATURE: 98 F | OXYGEN SATURATION: 94 % | RESPIRATION RATE: 16 BRPM | HEART RATE: 78 BPM

## 2023-04-09 LAB
ANION GAP SERPL CALC-SCNC: 3 MMOL/L (ref 0–18)
BUN BLD-MCNC: 19 MG/DL (ref 7–18)
CALCIUM BLD-MCNC: 9.3 MG/DL (ref 8.5–10.1)
CHLORIDE SERPL-SCNC: 108 MMOL/L (ref 98–112)
CO2 SERPL-SCNC: 25 MMOL/L (ref 21–32)
CREAT BLD-MCNC: 0.92 MG/DL
GFR SERPLBLD BASED ON 1.73 SQ M-ARVRAT: 96 ML/MIN/1.73M2 (ref 60–?)
GLUCOSE BLD-MCNC: 107 MG/DL (ref 70–99)
INR BLD: 1.38 (ref 0.85–1.16)
MAGNESIUM SERPL-MCNC: 1.8 MG/DL (ref 1.6–2.6)
OSMOLALITY SERPL CALC.SUM OF ELEC: 285 MOSM/KG (ref 275–295)
POTASSIUM SERPL-SCNC: 4 MMOL/L (ref 3.5–5.1)
PROTHROMBIN TIME: 16.9 SECONDS (ref 11.6–14.8)
SODIUM SERPL-SCNC: 136 MMOL/L (ref 136–145)

## 2023-04-09 PROCEDURE — 83735 ASSAY OF MAGNESIUM: CPT | Performed by: HOSPITALIST

## 2023-04-09 PROCEDURE — 85610 PROTHROMBIN TIME: CPT | Performed by: HOSPITALIST

## 2023-04-09 PROCEDURE — 80048 BASIC METABOLIC PNL TOTAL CA: CPT | Performed by: HOSPITALIST

## 2023-04-09 RX ORDER — LISINOPRIL 5 MG/1
5 TABLET ORAL DAILY
Status: DISCONTINUED | OUTPATIENT
Start: 2023-04-09 | End: 2023-04-09

## 2023-04-09 RX ORDER — MAGNESIUM OXIDE 400 MG/1
400 TABLET ORAL ONCE
Status: COMPLETED | OUTPATIENT
Start: 2023-04-09 | End: 2023-04-09

## 2023-04-09 RX ORDER — LISINOPRIL 5 MG/1
5 TABLET ORAL DAILY
Qty: 30 TABLET | Refills: 0 | Status: SHIPPED | OUTPATIENT
Start: 2023-04-09

## 2023-04-09 RX ORDER — FUROSEMIDE 20 MG/1
20 TABLET ORAL DAILY
Status: DISCONTINUED | OUTPATIENT
Start: 2023-04-09 | End: 2023-04-09

## 2023-04-09 NOTE — CM/SW NOTE
Spoke with patient regarding order for hhc--declining @ this time--more interested in  services--message left for [employer] (pt afterwards shared he was in the process of gettingbthis assistance)  In the mean time, has a friend who 'helps him'--pt resides in first floor apartment in [place].   Not intrerested inn information on a place for mom

## 2023-04-09 NOTE — PLAN OF CARE
Pt discharged home. IV removed. Tele dc'd and returned to monitor tech. Follow up instructions provided and discussed. Pt and family verbalized understanding. Rx scripts given. Discussed adverse reactions and side effects of all new medications and provided appropriate handouts. Pt and family verbalized understanding. Pt wheeled down by staff with all belongings. Pt left denying complaints of pain, malaise, or cardiac symptoms. All needs met by staff.

## 2023-04-13 RX ORDER — RIVAROXABAN 20 MG/1
TABLET, FILM COATED ORAL
Qty: 90 TABLET | Refills: 1 | Status: SHIPPED | OUTPATIENT
Start: 2023-04-13

## 2023-04-18 ENCOUNTER — TELEPHONE (OUTPATIENT)
Dept: CARDIOLOGY | Age: 58
End: 2023-04-18

## 2023-04-18 ENCOUNTER — E-ADVICE (OUTPATIENT)
Dept: CARDIOLOGY | Age: 58
End: 2023-04-18

## 2023-04-28 ENCOUNTER — APPOINTMENT (OUTPATIENT)
Dept: CARDIOLOGY | Age: 58
End: 2023-04-28

## 2023-05-01 RX ORDER — TRAZODONE HYDROCHLORIDE 50 MG/1
TABLET ORAL
COMMUNITY
Start: 2023-03-23

## 2023-05-01 RX ORDER — ZOLPIDEM TARTRATE 12.5 MG/1
TABLET, FILM COATED, EXTENDED RELEASE ORAL
COMMUNITY
Start: 2023-04-13

## 2023-05-01 RX ORDER — FUROSEMIDE 20 MG/1
TABLET ORAL
COMMUNITY
Start: 2023-04-09

## 2023-05-01 RX ORDER — GABAPENTIN 300 MG/1
2 CAPSULE ORAL 3 TIMES DAILY
COMMUNITY
Start: 2022-12-30

## 2023-05-01 RX ORDER — LISINOPRIL 5 MG/1
TABLET ORAL
COMMUNITY
Start: 2023-04-09

## 2023-05-01 RX ORDER — ELETRIPTAN HYDROBROMIDE 40 MG/1
40 TABLET, FILM COATED ORAL
COMMUNITY
Start: 2022-12-30

## 2023-05-01 RX ORDER — DILTIAZEM HYDROCHLORIDE 240 MG/1
360 CAPSULE, EXTENDED RELEASE ORAL DAILY
COMMUNITY

## 2023-05-01 RX ORDER — DIVALPROEX SODIUM 500 MG/1
TABLET, EXTENDED RELEASE ORAL DAILY
COMMUNITY
Start: 2023-02-06

## 2023-05-01 RX ORDER — AMIODARONE HYDROCHLORIDE 200 MG/1
TABLET ORAL
COMMUNITY
Start: 2023-04-26 | End: 2023-09-20 | Stop reason: ALTCHOICE

## 2023-05-01 RX ORDER — NIACIN 1000 MG/1
TABLET, EXTENDED RELEASE ORAL
COMMUNITY
Start: 2023-04-26

## 2023-05-01 RX ORDER — METFORMIN HYDROCHLORIDE 500 MG/1
TABLET, EXTENDED RELEASE ORAL
COMMUNITY
Start: 2022-11-28 | End: 2023-09-20 | Stop reason: ALTCHOICE

## 2023-05-01 RX ORDER — MECLIZINE HCL 12.5 MG/1
TABLET ORAL
COMMUNITY
Start: 2023-02-21

## 2023-05-01 RX ORDER — VENLAFAXINE HYDROCHLORIDE 150 MG/1
150 CAPSULE, EXTENDED RELEASE ORAL
COMMUNITY
Start: 2023-04-20

## 2023-05-03 ENCOUNTER — APPOINTMENT (OUTPATIENT)
Dept: CARDIOLOGY | Age: 58
End: 2023-05-03

## 2023-05-19 ENCOUNTER — APPOINTMENT (OUTPATIENT)
Dept: CARDIOLOGY | Age: 58
End: 2023-05-19

## 2023-05-31 RX ORDER — ARIPIPRAZOLE 15 MG/1
TABLET ORAL
COMMUNITY
Start: 2023-05-02

## 2023-06-01 ENCOUNTER — HOSPITAL ENCOUNTER (OUTPATIENT)
Facility: HOSPITAL | Age: 58
Setting detail: OBSERVATION
Discharge: HOME | End: 2023-06-02
Attending: EMERGENCY MEDICINE | Admitting: INTERNAL MEDICINE
Payer: MEDICARE

## 2023-06-01 ENCOUNTER — APPOINTMENT (OUTPATIENT)
Dept: GENERAL RADIOLOGY | Facility: HOSPITAL | Age: 58
End: 2023-06-01
Attending: EMERGENCY MEDICINE
Payer: MEDICARE

## 2023-06-01 ENCOUNTER — APPOINTMENT (OUTPATIENT)
Dept: GENERAL RADIOLOGY | Facility: HOSPITAL | Age: 58
End: 2023-06-01
Payer: MEDICARE

## 2023-06-01 DIAGNOSIS — I48.20 ATRIAL FIBRILLATION, CHRONIC (HCC): ICD-10-CM

## 2023-06-01 DIAGNOSIS — R07.9 ACUTE CHEST PAIN: Primary | ICD-10-CM

## 2023-06-01 PROBLEM — R73.9 HYPERGLYCEMIA: Status: ACTIVE | Noted: 2023-06-01

## 2023-06-01 PROBLEM — R79.89 AZOTEMIA: Status: ACTIVE | Noted: 2023-06-01

## 2023-06-01 LAB
ALBUMIN SERPL-MCNC: 3.7 G/DL (ref 3.4–5)
ALBUMIN/GLOB SERPL: 0.8 {RATIO} (ref 1–2)
ALP LIVER SERPL-CCNC: 101 U/L
ALT SERPL-CCNC: 42 U/L
ANION GAP SERPL CALC-SCNC: 6 MMOL/L (ref 0–18)
AST SERPL-CCNC: 30 U/L (ref 15–37)
BASOPHILS # BLD AUTO: 0.04 X10(3) UL (ref 0–0.2)
BASOPHILS NFR BLD AUTO: 0.5 %
BILIRUB SERPL-MCNC: 0.4 MG/DL (ref 0.1–2)
BUN BLD-MCNC: 22 MG/DL (ref 7–18)
CALCIUM BLD-MCNC: 9.5 MG/DL (ref 8.5–10.1)
CHLORIDE SERPL-SCNC: 106 MMOL/L (ref 98–112)
CO2 SERPL-SCNC: 24 MMOL/L (ref 21–32)
CREAT BLD-MCNC: 1.09 MG/DL
EOSINOPHIL # BLD AUTO: 0.22 X10(3) UL (ref 0–0.7)
EOSINOPHIL NFR BLD AUTO: 2.9 %
ERYTHROCYTE [DISTWIDTH] IN BLOOD BY AUTOMATED COUNT: 14.2 %
EST. AVERAGE GLUCOSE BLD GHB EST-MCNC: 146 MG/DL (ref 68–126)
GFR SERPLBLD BASED ON 1.73 SQ M-ARVRAT: 78 ML/MIN/1.73M2 (ref 60–?)
GLOBULIN PLAS-MCNC: 4.5 G/DL (ref 2.8–4.4)
GLUCOSE BLD-MCNC: 115 MG/DL (ref 70–99)
HBA1C MFR BLD: 6.7 % (ref ?–5.7)
HCT VFR BLD AUTO: 47.7 %
HGB BLD-MCNC: 16.1 G/DL
IMM GRANULOCYTES # BLD AUTO: 0.03 X10(3) UL (ref 0–1)
IMM GRANULOCYTES NFR BLD: 0.4 %
LYMPHOCYTES # BLD AUTO: 2.25 X10(3) UL (ref 1–4)
LYMPHOCYTES NFR BLD AUTO: 29.3 %
MCH RBC QN AUTO: 30.6 PG (ref 26–34)
MCHC RBC AUTO-ENTMCNC: 33.8 G/DL (ref 31–37)
MCV RBC AUTO: 90.7 FL
MONOCYTES # BLD AUTO: 0.97 X10(3) UL (ref 0.1–1)
MONOCYTES NFR BLD AUTO: 12.6 %
NEUTROPHILS # BLD AUTO: 4.17 X10 (3) UL (ref 1.5–7.7)
NEUTROPHILS # BLD AUTO: 4.17 X10(3) UL (ref 1.5–7.7)
NEUTROPHILS NFR BLD AUTO: 54.3 %
NT-PROBNP SERPL-MCNC: 366 PG/ML (ref ?–125)
OSMOLALITY SERPL CALC.SUM OF ELEC: 286 MOSM/KG (ref 275–295)
PLATELET # BLD AUTO: 210 10(3)UL (ref 150–450)
POTASSIUM SERPL-SCNC: 4.3 MMOL/L (ref 3.5–5.1)
PROT SERPL-MCNC: 8.2 G/DL (ref 6.4–8.2)
Q-T INTERVAL: 368 MS
QRS DURATION: 90 MS
QTC CALCULATION (BEZET): 447 MS
R AXIS: 269 DEGREES
RBC # BLD AUTO: 5.26 X10(6)UL
SODIUM SERPL-SCNC: 136 MMOL/L (ref 136–145)
T AXIS: 15 DEGREES
TROPONIN I HIGH SENSITIVITY: 6 NG/L
VENTRICULAR RATE: 89 BPM
WBC # BLD AUTO: 7.7 X10(3) UL (ref 4–11)

## 2023-06-01 PROCEDURE — 93005 ELECTROCARDIOGRAM TRACING: CPT

## 2023-06-01 PROCEDURE — 93010 ELECTROCARDIOGRAM REPORT: CPT | Performed by: INTERNAL MEDICINE

## 2023-06-01 PROCEDURE — 85025 COMPLETE CBC W/AUTO DIFF WBC: CPT | Performed by: EMERGENCY MEDICINE

## 2023-06-01 PROCEDURE — 99285 EMERGENCY DEPT VISIT HI MDM: CPT | Performed by: EMERGENCY MEDICINE

## 2023-06-01 PROCEDURE — 93010 ELECTROCARDIOGRAM REPORT: CPT | Performed by: EMERGENCY MEDICINE

## 2023-06-01 PROCEDURE — 94660 CPAP INITIATION&MGMT: CPT

## 2023-06-01 PROCEDURE — 84484 ASSAY OF TROPONIN QUANT: CPT

## 2023-06-01 PROCEDURE — 84484 ASSAY OF TROPONIN QUANT: CPT | Performed by: EMERGENCY MEDICINE

## 2023-06-01 PROCEDURE — 36415 COLL VENOUS BLD VENIPUNCTURE: CPT | Performed by: EMERGENCY MEDICINE

## 2023-06-01 PROCEDURE — 80053 COMPREHEN METABOLIC PANEL: CPT

## 2023-06-01 PROCEDURE — 71045 X-RAY EXAM CHEST 1 VIEW: CPT | Performed by: EMERGENCY MEDICINE

## 2023-06-01 PROCEDURE — 80053 COMPREHEN METABOLIC PANEL: CPT | Performed by: EMERGENCY MEDICINE

## 2023-06-01 PROCEDURE — 83036 HEMOGLOBIN GLYCOSYLATED A1C: CPT | Performed by: HOSPITALIST

## 2023-06-01 PROCEDURE — 85025 COMPLETE CBC W/AUTO DIFF WBC: CPT

## 2023-06-01 PROCEDURE — 99284 EMERGENCY DEPT VISIT MOD MDM: CPT | Performed by: EMERGENCY MEDICINE

## 2023-06-01 PROCEDURE — 83880 ASSAY OF NATRIURETIC PEPTIDE: CPT | Performed by: EMERGENCY MEDICINE

## 2023-06-01 RX ORDER — ASPIRIN 81 MG/1
324 TABLET, CHEWABLE ORAL ONCE
Status: DISCONTINUED | OUTPATIENT
Start: 2023-06-01 | End: 2023-06-02

## 2023-06-01 RX ORDER — DILTIAZEM HYDROCHLORIDE 180 MG/1
360 CAPSULE, EXTENDED RELEASE ORAL NIGHTLY
Status: DISCONTINUED | OUTPATIENT
Start: 2023-06-01 | End: 2023-06-02

## 2023-06-01 RX ORDER — METOPROLOL SUCCINATE 50 MG/1
100 TABLET, EXTENDED RELEASE ORAL
Status: DISCONTINUED | OUTPATIENT
Start: 2023-06-01 | End: 2023-06-02

## 2023-06-01 RX ORDER — BISACODYL 10 MG
10 SUPPOSITORY, RECTAL RECTAL
Status: DISCONTINUED | OUTPATIENT
Start: 2023-06-01 | End: 2023-06-02

## 2023-06-01 RX ORDER — PROCHLORPERAZINE EDISYLATE 5 MG/ML
5 INJECTION INTRAMUSCULAR; INTRAVENOUS EVERY 8 HOURS PRN
Status: DISCONTINUED | OUTPATIENT
Start: 2023-06-01 | End: 2023-06-02

## 2023-06-01 RX ORDER — POLYETHYLENE GLYCOL 3350 17 G/17G
17 POWDER, FOR SOLUTION ORAL DAILY PRN
Status: DISCONTINUED | OUTPATIENT
Start: 2023-06-01 | End: 2023-06-02

## 2023-06-01 RX ORDER — ONDANSETRON 2 MG/ML
4 INJECTION INTRAMUSCULAR; INTRAVENOUS EVERY 6 HOURS PRN
Status: DISCONTINUED | OUTPATIENT
Start: 2023-06-01 | End: 2023-06-02

## 2023-06-01 RX ORDER — LISINOPRIL 5 MG/1
5 TABLET ORAL NIGHTLY
Status: DISCONTINUED | OUTPATIENT
Start: 2023-06-01 | End: 2023-06-02

## 2023-06-01 RX ORDER — ACETAMINOPHEN 500 MG
500 TABLET ORAL EVERY 4 HOURS PRN
Status: DISCONTINUED | OUTPATIENT
Start: 2023-06-01 | End: 2023-06-02

## 2023-06-01 RX ORDER — ZOLPIDEM TARTRATE 5 MG/1
10 TABLET ORAL NIGHTLY PRN
Status: DISCONTINUED | OUTPATIENT
Start: 2023-06-01 | End: 2023-06-02

## 2023-06-01 RX ORDER — FUROSEMIDE 20 MG/1
20 TABLET ORAL DAILY
Status: DISCONTINUED | OUTPATIENT
Start: 2023-06-01 | End: 2023-06-02

## 2023-06-01 RX ORDER — SODIUM CHLORIDE 9 MG/ML
INJECTION, SOLUTION INTRAVENOUS
OUTPATIENT
Start: 2023-06-02 | End: 2023-06-02

## 2023-06-01 RX ORDER — LISINOPRIL 5 MG/1
5 TABLET ORAL DAILY
Status: DISCONTINUED | OUTPATIENT
Start: 2023-06-02 | End: 2023-06-01

## 2023-06-01 RX ORDER — SENNOSIDES 8.6 MG
17.2 TABLET ORAL NIGHTLY PRN
Status: DISCONTINUED | OUTPATIENT
Start: 2023-06-01 | End: 2023-06-02

## 2023-06-01 RX ORDER — DILTIAZEM HYDROCHLORIDE 180 MG/1
360 CAPSULE, EXTENDED RELEASE ORAL DAILY
Status: DISCONTINUED | OUTPATIENT
Start: 2023-06-02 | End: 2023-06-01

## 2023-06-01 NOTE — ED QUICK NOTES
Orders for admission, patient is aware of plan and ready to go upstairs. Any questions, please call ED RN Israel Cid at extension [phone].      Patient Covid vaccination status: Fully vaccinated     COVID Test Ordered in ED: None    COVID Suspicion at Admission: N/A    Running Infusions:  None    Mental Status/LOC at time of transport: A&Ox4    Other pertinent information:   CIWA score: N/A   NIH score:  N/A

## 2023-06-01 NOTE — ED INITIAL ASSESSMENT (HPI)
Pt w/ c/o midsternal chest pain x 2 days. States \"my heart wants to seize\" Denies n/v. . Pt a/ox4 RR equal/nonlabored. Reports sob w/ exertion.  +cough

## 2023-06-02 ENCOUNTER — APPOINTMENT (OUTPATIENT)
Dept: CARDIOLOGY | Age: 58
End: 2023-06-02

## 2023-06-02 VITALS
TEMPERATURE: 98 F | HEART RATE: 61 BPM | DIASTOLIC BLOOD PRESSURE: 56 MMHG | RESPIRATION RATE: 18 BRPM | HEIGHT: 70 IN | WEIGHT: 315 LBS | BODY MASS INDEX: 45.1 KG/M2 | OXYGEN SATURATION: 98 % | SYSTOLIC BLOOD PRESSURE: 103 MMHG

## 2023-06-02 LAB
ANION GAP SERPL CALC-SCNC: 6 MMOL/L (ref 0–18)
ATRIAL RATE: 357 BPM
BUN BLD-MCNC: 18 MG/DL (ref 7–18)
CALCIUM BLD-MCNC: 9.2 MG/DL (ref 8.5–10.1)
CHLORIDE SERPL-SCNC: 108 MMOL/L (ref 98–112)
CO2 SERPL-SCNC: 24 MMOL/L (ref 21–32)
CREAT BLD-MCNC: 0.92 MG/DL
GFR SERPLBLD BASED ON 1.73 SQ M-ARVRAT: 96 ML/MIN/1.73M2 (ref 60–?)
GLUCOSE BLD-MCNC: 132 MG/DL (ref 70–99)
MAGNESIUM SERPL-MCNC: 2.1 MG/DL (ref 1.6–2.6)
OSMOLALITY SERPL CALC.SUM OF ELEC: 290 MOSM/KG (ref 275–295)
POTASSIUM SERPL-SCNC: 4 MMOL/L (ref 3.5–5.1)
Q-T INTERVAL: 384 MS
QRS DURATION: 96 MS
QTC CALCULATION (BEZET): 446 MS
R AXIS: 253 DEGREES
SODIUM SERPL-SCNC: 138 MMOL/L (ref 136–145)
T AXIS: -11 DEGREES
TROPONIN I HIGH SENSITIVITY: 6 NG/L
VENTRICULAR RATE: 81 BPM

## 2023-06-02 PROCEDURE — 94799 UNLISTED PULMONARY SVC/PX: CPT

## 2023-06-02 PROCEDURE — 80048 BASIC METABOLIC PNL TOTAL CA: CPT | Performed by: HOSPITALIST

## 2023-06-02 PROCEDURE — 84484 ASSAY OF TROPONIN QUANT: CPT | Performed by: INTERNAL MEDICINE

## 2023-06-02 PROCEDURE — 83735 ASSAY OF MAGNESIUM: CPT | Performed by: HOSPITALIST

## 2023-06-02 NOTE — DISCHARGE INSTRUCTIONS
Please call to inquire about assistance with caregiver/homemaker services:    [facility]  [phone] [email or web address]    [address] [facility]    [phone]  [email or web address]

## 2023-06-02 NOTE — PLAN OF CARE
NURSING ADMISSION NOTE      Patient admitted via Cart  Oriented to room. Safety precautions initiated. Bed in low position. Call light in reach. Pt denies cp at this time. Nhi Jules paged at 2046 to clarify no orders for serial tops as well as cardiac cath orders despite 's note stating no plan for cath at this time and per pt he does not want a procedure. Md stated to order troponin. Md could not answer re cath orders. Orders not released since pt states he does not want a cath and md's note states observe overnight, tele and serial trops. Afib on tele rate controlled. Pt c/o ache to mid sternum when he laid flat. Hob raised and pt given tylenol, ekg done no change from ER ekg. Pain improved. Will continue to monitor.

## 2023-06-02 NOTE — PLAN OF CARE
Assumed patient care this morning. Alert, awake. Breathing unlabored. Denies pain. Tolerating oral intake. Seen by cardiology and hospitalist, cleared for discharge. Called number provided by cardiology to schedule follow up appt but per , Dr. Hilda Hong does not have available follow up appts and patient will need to call later to schedule. Updated patient with need to schedule f/u appt with cards. Discharge instructions given to and reviewed with patient, verbalized understanding.

## 2023-06-27 RX ORDER — METOPROLOL SUCCINATE 100 MG/1
TABLET, EXTENDED RELEASE ORAL
Qty: 90 TABLET | Refills: 0 | Status: SHIPPED | OUTPATIENT
Start: 2023-06-27 | End: 2023-09-22

## 2023-07-15 ENCOUNTER — HOSPITAL ENCOUNTER (EMERGENCY)
Facility: HOSPITAL | Age: 58
Discharge: HOME | End: 2023-07-15
Attending: EMERGENCY MEDICINE
Payer: MEDICARE

## 2023-07-15 VITALS
DIASTOLIC BLOOD PRESSURE: 70 MMHG | OXYGEN SATURATION: 96 % | RESPIRATION RATE: 18 BRPM | BODY MASS INDEX: 46.65 KG/M2 | TEMPERATURE: 98 F | SYSTOLIC BLOOD PRESSURE: 144 MMHG | WEIGHT: 315 LBS | HEART RATE: 84 BPM | HEIGHT: 69 IN

## 2023-07-15 DIAGNOSIS — R00.2 PALPITATIONS: ICD-10-CM

## 2023-07-15 DIAGNOSIS — I48.20 ATRIAL FIBRILLATION, CHRONIC (HCC): Primary | ICD-10-CM

## 2023-07-15 LAB
ANION GAP SERPL CALC-SCNC: 7 MMOL/L (ref 0–18)
BASOPHILS # BLD AUTO: 0.06 X10(3) UL (ref 0–0.2)
BASOPHILS NFR BLD AUTO: 0.7 %
BUN BLD-MCNC: 9 MG/DL (ref 7–18)
BUN/CREAT SERPL: 9.4 (ref 10–20)
CALCIUM BLD-MCNC: 8.4 MG/DL (ref 8.5–10.1)
CHLORIDE SERPL-SCNC: 106 MMOL/L (ref 98–112)
CO2 SERPL-SCNC: 26 MMOL/L (ref 21–32)
CREAT BLD-MCNC: 0.96 MG/DL
DEPRECATED RDW RBC AUTO: 47.9 FL (ref 35.1–46.3)
EOSINOPHIL # BLD AUTO: 0.19 X10(3) UL (ref 0–0.7)
EOSINOPHIL NFR BLD AUTO: 2.1 %
ERYTHROCYTE [DISTWIDTH] IN BLOOD BY AUTOMATED COUNT: 14.1 % (ref 11–15)
GFR SERPLBLD BASED ON 1.73 SQ M-ARVRAT: 91 ML/MIN/1.73M2 (ref 60–?)
GLUCOSE BLD-MCNC: 153 MG/DL (ref 70–99)
HCT VFR BLD AUTO: 45.7 %
HGB BLD-MCNC: 15.3 G/DL
IMM GRANULOCYTES # BLD AUTO: 0.06 X10(3) UL (ref 0–1)
IMM GRANULOCYTES NFR BLD: 0.7 %
LYMPHOCYTES # BLD AUTO: 2.55 X10(3) UL (ref 1–4)
LYMPHOCYTES NFR BLD AUTO: 28.4 %
MCH RBC QN AUTO: 31.1 PG (ref 26–34)
MCHC RBC AUTO-ENTMCNC: 33.5 G/DL (ref 31–37)
MCV RBC AUTO: 92.9 FL
MONOCYTES # BLD AUTO: 0.79 X10(3) UL (ref 0.1–1)
MONOCYTES NFR BLD AUTO: 8.8 %
NEUTROPHILS # BLD AUTO: 5.33 X10 (3) UL (ref 1.5–7.7)
NEUTROPHILS # BLD AUTO: 5.33 X10(3) UL (ref 1.5–7.7)
NEUTROPHILS NFR BLD AUTO: 59.3 %
OSMOLALITY SERPL CALC.SUM OF ELEC: 290 MOSM/KG (ref 275–295)
PLATELET # BLD AUTO: 230 10(3)UL (ref 150–450)
POTASSIUM SERPL-SCNC: 4 MMOL/L (ref 3.5–5.1)
Q-T INTERVAL: 378 MS
QRS DURATION: 90 MS
QTC CALCULATION (BEZET): 472 MS
R AXIS: 144 DEGREES
RBC # BLD AUTO: 4.92 X10(6)UL
SODIUM SERPL-SCNC: 139 MMOL/L (ref 136–145)
T AXIS: 78 DEGREES
TROPONIN I HIGH SENSITIVITY: 9 NG/L
VENTRICULAR RATE: 94 BPM
WBC # BLD AUTO: 9 X10(3) UL (ref 4–11)

## 2023-07-15 PROCEDURE — 93005 ELECTROCARDIOGRAM TRACING: CPT

## 2023-07-15 PROCEDURE — 84484 ASSAY OF TROPONIN QUANT: CPT | Performed by: EMERGENCY MEDICINE

## 2023-07-15 PROCEDURE — 85025 COMPLETE CBC W/AUTO DIFF WBC: CPT | Performed by: EMERGENCY MEDICINE

## 2023-07-15 PROCEDURE — 99284 EMERGENCY DEPT VISIT MOD MDM: CPT

## 2023-07-15 PROCEDURE — 36415 COLL VENOUS BLD VENIPUNCTURE: CPT

## 2023-07-15 PROCEDURE — 93010 ELECTROCARDIOGRAM REPORT: CPT

## 2023-07-15 PROCEDURE — 80048 BASIC METABOLIC PNL TOTAL CA: CPT | Performed by: EMERGENCY MEDICINE

## 2023-07-15 NOTE — ED INITIAL ASSESSMENT (HPI)
Pt presents from home with c/o heart \"fluttering\". Pt reports he was recently admitted for this same thing. Pt denies chest pain or SOB.

## 2023-07-15 NOTE — DISCHARGE INSTRUCTIONS
Return if chest pain shortness of breath dizziness  Continue your previously prescribed medications  Follow-up with your cardiologist

## 2023-07-26 ENCOUNTER — APPOINTMENT (OUTPATIENT)
Dept: CARDIOLOGY | Age: 58
End: 2023-07-26

## 2023-09-06 RX ORDER — DILTIAZEM HYDROCHLORIDE 360 MG/1
360 CAPSULE, EXTENDED RELEASE ORAL DAILY
Qty: 90 CAPSULE | Refills: 0 | Status: SHIPPED | OUTPATIENT
Start: 2023-09-06

## 2023-09-20 ENCOUNTER — OFFICE VISIT (OUTPATIENT)
Dept: CARDIOLOGY | Age: 58
End: 2023-09-20

## 2023-09-20 VITALS
BODY MASS INDEX: 44.1 KG/M2 | HEIGHT: 71 IN | HEART RATE: 89 BPM | SYSTOLIC BLOOD PRESSURE: 136 MMHG | DIASTOLIC BLOOD PRESSURE: 89 MMHG | WEIGHT: 315 LBS

## 2023-09-20 DIAGNOSIS — I48.20 CHRONIC ATRIAL FIBRILLATION (CMD): Primary | ICD-10-CM

## 2023-09-20 PROCEDURE — 3079F DIAST BP 80-89 MM HG: CPT | Performed by: INTERNAL MEDICINE

## 2023-09-20 PROCEDURE — 93000 ELECTROCARDIOGRAM COMPLETE: CPT | Performed by: INTERNAL MEDICINE

## 2023-09-20 PROCEDURE — 99215 OFFICE O/P EST HI 40 MIN: CPT | Performed by: INTERNAL MEDICINE

## 2023-09-20 PROCEDURE — 3075F SYST BP GE 130 - 139MM HG: CPT | Performed by: INTERNAL MEDICINE

## 2023-09-20 SDOH — HEALTH STABILITY: PHYSICAL HEALTH: ON AVERAGE, HOW MANY DAYS PER WEEK DO YOU ENGAGE IN MODERATE TO STRENUOUS EXERCISE (LIKE A BRISK WALK)?: 2 DAYS

## 2023-09-20 SDOH — HEALTH STABILITY: PHYSICAL HEALTH: ON AVERAGE, HOW MANY MINUTES DO YOU ENGAGE IN EXERCISE AT THIS LEVEL?: 60 MIN

## 2023-09-20 ASSESSMENT — PATIENT HEALTH QUESTIONNAIRE - PHQ9
SUM OF ALL RESPONSES TO PHQ9 QUESTIONS 1 AND 2: 0
CLINICAL INTERPRETATION OF PHQ2 SCORE: NO FURTHER SCREENING NEEDED
2. FEELING DOWN, DEPRESSED OR HOPELESS: NOT AT ALL
SUM OF ALL RESPONSES TO PHQ9 QUESTIONS 1 AND 2: 0
1. LITTLE INTEREST OR PLEASURE IN DOING THINGS: NOT AT ALL

## 2023-09-21 ENCOUNTER — TELEPHONE (OUTPATIENT)
Dept: CARDIOLOGY | Age: 58
End: 2023-09-21

## 2023-09-21 ENCOUNTER — E-ADVICE (OUTPATIENT)
Dept: CARDIOLOGY | Age: 58
End: 2023-09-21

## 2023-09-21 DIAGNOSIS — I48.20 CHRONIC ATRIAL FIBRILLATION (CMD): Primary | ICD-10-CM

## 2023-09-22 RX ORDER — METOPROLOL SUCCINATE 100 MG/1
TABLET, EXTENDED RELEASE ORAL
Qty: 90 TABLET | Refills: 3 | Status: SHIPPED | OUTPATIENT
Start: 2023-09-22

## 2023-09-25 ENCOUNTER — E-ADVICE (OUTPATIENT)
Dept: CARDIOLOGY | Age: 58
End: 2023-09-25

## 2023-12-29 NOTE — PROGRESS NOTES
[facility]    Progress Note    Adi Bonilla Patient Status:  Inpatient    1965 MRN [id number]   Location [facility] 3W/SW Attending George Carballo MD   Hosp Day # 1 PCP Marivel Duncan MD     Subjective:     Sally Guallpa -Prominent R(V1) -true posterior extension of inferior MI -Prominent R(V1) and right axis -consider right ventricular hypertrophy -Possible posterior fascicular block.  ABNORMAL When compared with ECG of 12/09/2017 07:27:00 HR has decreased and PVC seen Paige None

## 2024-01-22 ENCOUNTER — E-ADVICE (OUTPATIENT)
Dept: CARDIOLOGY | Age: 59
End: 2024-01-22

## 2024-02-10 ENCOUNTER — APPOINTMENT (OUTPATIENT)
Dept: GENERAL RADIOLOGY | Facility: HOSPITAL | Age: 59
End: 2024-02-10
Attending: EMERGENCY MEDICINE
Payer: MEDICARE

## 2024-02-10 ENCOUNTER — HOSPITAL ENCOUNTER (OUTPATIENT)
Facility: HOSPITAL | Age: 59
Setting detail: OBSERVATION
Discharge: NURSING, REHAB OR LONG-TERM CARE | End: 2024-02-16
Attending: EMERGENCY MEDICINE | Admitting: HOSPITALIST
Payer: MEDICARE

## 2024-02-10 DIAGNOSIS — R26.2 INABILITY TO AMBULATE DUE TO KNEE: ICD-10-CM

## 2024-02-10 DIAGNOSIS — S80.00XA CONTUSION OF KNEE, UNSPECIFIED LATERALITY, INITIAL ENCOUNTER: Primary | ICD-10-CM

## 2024-02-10 PROCEDURE — 73560 X-RAY EXAM OF KNEE 1 OR 2: CPT | Performed by: EMERGENCY MEDICINE

## 2024-02-10 PROCEDURE — 99285 EMERGENCY DEPT VISIT HI MDM: CPT

## 2024-02-10 PROCEDURE — 96372 THER/PROPH/DIAG INJ SC/IM: CPT

## 2024-02-10 RX ORDER — BISACODYL 10 MG
10 SUPPOSITORY, RECTAL RECTAL
Status: DISCONTINUED | OUTPATIENT
Start: 2024-02-10 | End: 2024-02-16

## 2024-02-10 RX ORDER — METOPROLOL SUCCINATE 100 MG/1
100 TABLET, EXTENDED RELEASE ORAL
Status: DISCONTINUED | OUTPATIENT
Start: 2024-02-10 | End: 2024-02-16

## 2024-02-10 RX ORDER — MORPHINE SULFATE 2 MG/ML
2 INJECTION, SOLUTION INTRAMUSCULAR; INTRAVENOUS EVERY 2 HOUR PRN
Status: DISCONTINUED | OUTPATIENT
Start: 2024-02-10 | End: 2024-02-15

## 2024-02-10 RX ORDER — PROCHLORPERAZINE EDISYLATE 5 MG/ML
5 INJECTION INTRAMUSCULAR; INTRAVENOUS EVERY 8 HOURS PRN
Status: DISCONTINUED | OUTPATIENT
Start: 2024-02-10 | End: 2024-02-16

## 2024-02-10 RX ORDER — TRAZODONE HYDROCHLORIDE 100 MG/1
200 TABLET ORAL NIGHTLY
Status: ON HOLD | COMMUNITY
Start: 2023-03-23

## 2024-02-10 RX ORDER — FUROSEMIDE 20 MG/1
20 TABLET ORAL DAILY
Status: DISCONTINUED | OUTPATIENT
Start: 2024-02-11 | End: 2024-02-16

## 2024-02-10 RX ORDER — MELATONIN
3 NIGHTLY PRN
Status: DISCONTINUED | OUTPATIENT
Start: 2024-02-10 | End: 2024-02-16

## 2024-02-10 RX ORDER — ONDANSETRON 2 MG/ML
4 INJECTION INTRAMUSCULAR; INTRAVENOUS EVERY 6 HOURS PRN
Status: DISCONTINUED | OUTPATIENT
Start: 2024-02-10 | End: 2024-02-16

## 2024-02-10 RX ORDER — MORPHINE SULFATE 4 MG/ML
4 INJECTION, SOLUTION INTRAMUSCULAR; INTRAVENOUS EVERY 2 HOUR PRN
Status: DISCONTINUED | OUTPATIENT
Start: 2024-02-10 | End: 2024-02-15

## 2024-02-10 RX ORDER — TRAZODONE HYDROCHLORIDE 100 MG/1
200 TABLET ORAL NIGHTLY
Status: DISCONTINUED | OUTPATIENT
Start: 2024-02-10 | End: 2024-02-16

## 2024-02-10 RX ORDER — TRAMADOL HYDROCHLORIDE 50 MG/1
50 TABLET ORAL EVERY 6 HOURS PRN
Status: DISCONTINUED | OUTPATIENT
Start: 2024-02-10 | End: 2024-02-16

## 2024-02-10 RX ORDER — DILTIAZEM HYDROCHLORIDE 360 MG/1
360 CAPSULE, EXTENDED RELEASE ORAL DAILY
Status: ON HOLD | COMMUNITY
Start: 2023-11-16

## 2024-02-10 RX ORDER — LISINOPRIL 5 MG/1
5 TABLET ORAL DAILY
Status: DISCONTINUED | OUTPATIENT
Start: 2024-02-11 | End: 2024-02-16

## 2024-02-10 RX ORDER — SENNOSIDES 8.6 MG
17.2 TABLET ORAL NIGHTLY PRN
Status: DISCONTINUED | OUTPATIENT
Start: 2024-02-10 | End: 2024-02-16

## 2024-02-10 RX ORDER — KETOROLAC TROMETHAMINE 30 MG/ML
60 INJECTION, SOLUTION INTRAMUSCULAR; INTRAVENOUS ONCE
Status: COMPLETED | OUTPATIENT
Start: 2024-02-10 | End: 2024-02-10

## 2024-02-10 RX ORDER — ENEMA 19; 7 G/133ML; G/133ML
1 ENEMA RECTAL ONCE AS NEEDED
Status: DISCONTINUED | OUTPATIENT
Start: 2024-02-10 | End: 2024-02-16

## 2024-02-10 RX ORDER — MORPHINE SULFATE 2 MG/ML
1 INJECTION, SOLUTION INTRAMUSCULAR; INTRAVENOUS EVERY 2 HOUR PRN
Status: DISCONTINUED | OUTPATIENT
Start: 2024-02-10 | End: 2024-02-15

## 2024-02-10 RX ORDER — ACETAMINOPHEN 500 MG
500 TABLET ORAL EVERY 4 HOURS PRN
Status: DISCONTINUED | OUTPATIENT
Start: 2024-02-10 | End: 2024-02-16

## 2024-02-10 RX ORDER — POLYETHYLENE GLYCOL 3350 17 G/17G
17 POWDER, FOR SOLUTION ORAL DAILY PRN
Status: DISCONTINUED | OUTPATIENT
Start: 2024-02-10 | End: 2024-02-16

## 2024-02-11 ENCOUNTER — APPOINTMENT (OUTPATIENT)
Dept: CT IMAGING | Facility: HOSPITAL | Age: 59
End: 2024-02-11
Attending: ORTHOPAEDIC SURGERY
Payer: MEDICARE

## 2024-02-11 LAB
ANION GAP SERPL CALC-SCNC: 4 MMOL/L (ref 0–18)
BUN BLD-MCNC: 15 MG/DL (ref 9–23)
CALCIUM BLD-MCNC: 8.8 MG/DL (ref 8.5–10.1)
CHLORIDE SERPL-SCNC: 108 MMOL/L (ref 98–112)
CO2 SERPL-SCNC: 25 MMOL/L (ref 21–32)
CREAT BLD-MCNC: 0.86 MG/DL
EGFRCR SERPLBLD CKD-EPI 2021: 100 ML/MIN/1.73M2 (ref 60–?)
ERYTHROCYTE [DISTWIDTH] IN BLOOD BY AUTOMATED COUNT: 13.3 %
GLUCOSE BLD-MCNC: 136 MG/DL (ref 70–99)
HCT VFR BLD AUTO: 44 %
HGB BLD-MCNC: 14.7 G/DL
MCH RBC QN AUTO: 31.3 PG (ref 26–34)
MCHC RBC AUTO-ENTMCNC: 33.4 G/DL (ref 31–37)
MCV RBC AUTO: 93.8 FL
OSMOLALITY SERPL CALC.SUM OF ELEC: 287 MOSM/KG (ref 275–295)
PLATELET # BLD AUTO: 186 10(3)UL (ref 150–450)
POTASSIUM SERPL-SCNC: 4.2 MMOL/L (ref 3.5–5.1)
RBC # BLD AUTO: 4.69 X10(6)UL
SODIUM SERPL-SCNC: 137 MMOL/L (ref 136–145)
WBC # BLD AUTO: 9.4 X10(3) UL (ref 4–11)

## 2024-02-11 PROCEDURE — 73700 CT LOWER EXTREMITY W/O DYE: CPT | Performed by: ORTHOPAEDIC SURGERY

## 2024-02-11 PROCEDURE — 97165 OT EVAL LOW COMPLEX 30 MIN: CPT

## 2024-02-11 PROCEDURE — 85027 COMPLETE CBC AUTOMATED: CPT | Performed by: HOSPITALIST

## 2024-02-11 PROCEDURE — 97530 THERAPEUTIC ACTIVITIES: CPT

## 2024-02-11 PROCEDURE — 80048 BASIC METABOLIC PNL TOTAL CA: CPT | Performed by: HOSPITALIST

## 2024-02-11 PROCEDURE — 97161 PT EVAL LOW COMPLEX 20 MIN: CPT

## 2024-02-11 RX ORDER — HYDROMORPHONE HYDROCHLORIDE 1 MG/ML
0.2 INJECTION, SOLUTION INTRAMUSCULAR; INTRAVENOUS; SUBCUTANEOUS EVERY 2 HOUR PRN
Status: DISCONTINUED | OUTPATIENT
Start: 2024-02-11 | End: 2024-02-16

## 2024-02-11 RX ORDER — HYDROCODONE BITARTRATE AND ACETAMINOPHEN 5; 325 MG/1; MG/1
1 TABLET ORAL EVERY 4 HOURS PRN
Status: DISCONTINUED | OUTPATIENT
Start: 2024-02-11 | End: 2024-02-12

## 2024-02-11 RX ORDER — DILTIAZEM HYDROCHLORIDE 240 MG/1
240 CAPSULE, COATED, EXTENDED RELEASE ORAL NIGHTLY
Status: DISCONTINUED | OUTPATIENT
Start: 2024-02-11 | End: 2024-02-16

## 2024-02-11 RX ORDER — HYDROCODONE BITARTRATE AND ACETAMINOPHEN 5; 325 MG/1; MG/1
2 TABLET ORAL EVERY 4 HOURS PRN
Status: DISCONTINUED | OUTPATIENT
Start: 2024-02-11 | End: 2024-02-12

## 2024-02-11 RX ORDER — HYDROMORPHONE HYDROCHLORIDE 1 MG/ML
0.4 INJECTION, SOLUTION INTRAMUSCULAR; INTRAVENOUS; SUBCUTANEOUS EVERY 2 HOUR PRN
Status: DISCONTINUED | OUTPATIENT
Start: 2024-02-11 | End: 2024-02-16

## 2024-02-11 RX ORDER — HYDROMORPHONE HYDROCHLORIDE 1 MG/ML
0.8 INJECTION, SOLUTION INTRAMUSCULAR; INTRAVENOUS; SUBCUTANEOUS EVERY 2 HOUR PRN
Status: DISCONTINUED | OUTPATIENT
Start: 2024-02-11 | End: 2024-02-16

## 2024-02-11 NOTE — ED QUICK NOTES
Orders for admission, patient is aware of plan and ready to go upstairs. Any questions, please call ED RN Erika  at extension [phone].     Titratable drug(s) infusing: N/A  Rate: N/A    LOC at time of transport: A/Ox4    Other pertinent information: N/A    CIWA score= N/A   NIH score= N/A

## 2024-02-11 NOTE — CM/SW NOTE
HOME SITUATION  Type of Home: Apartment   Home Layout: One level  Stairs to Enter : 0     Lives With: Alone  Patient Owned Equipment: None     Prior Level of [place]: Pt typically indep with ADLs and mobility. Gets short of breath with increased activity.    (PT evaluation note)    Patient admitted from home for knee injury after a fall.  Patient lives home alone prior to admission; gradual rehabilitative therapy currently recommended by inpatient therapy team.  CM started DOUG referral in aidin system and completed PASRR screening, review requested by [facility].  Will endorse to Care Coordination team for follow up with patient for additional needs.      Yudy Perez RN Case Manager [phone]

## 2024-02-11 NOTE — ED PROVIDER NOTES
Patient Seen in: [facility] Emergency Department      History     Chief Complaint   Patient presents with    Knee Pain    Fall     Stated Complaint: mechanical fall, right knee pain    Subjective:   HPI    58-year-old male comes to the hospital complaint having bilateral knee pain after a fall.  He states he was walking with rubber sandals that caught on a rug which caused a rug slipped out which made his knees flareup and then landed on his knees bilaterally.  He has pain in both knees at this time.  Denies any head trauma or headaches.  No neck pain.  No back pain.  Has no pain in his chest or abdomen.  He has no numbness or weakness.  Denying any other complaints.    Objective:   Past Medical History:   Diagnosis Date    Alcohol abuse     last drink Jan 2020    Anxiety     [facility] psych: Dr. Denny    Atrial fibrillation (HCC)     EP cards: Dr. Sumeet Colon, PAroxysmal AFIB    Calculus of kidney     Congestive heart failure (CHF) ([facility])     Depression     [facility] psych: Dr. Denny--currently on wellbutrin as of 3/28/22, tried zoloft and didn't help    Diabetes (HCC)     High blood pressure     Hyperlipidemia     Morbid obesity ([facility])     Obstructive sleep apnea     Vertigo     Dr. Kallie Acevedo----Mirtazepine              Past Surgical History:   Procedure Laterality Date    APPENDECTOMY      COLONOSCOPY      JAW SURGERY      OTHER SURGICAL HISTORY  02/2018, 2020    cardioversion, Dr. Sumeet Colon---2020    OTHER SURGICAL HISTORY  05/07/2019    Cysto-Dr. Timmons                Social History     Socioeconomic History    Marital status: Single   Tobacco Use    Smoking status: Never    Smokeless tobacco: Never   Vaping Use    Vaping Use: Never used   Substance and Sexual Activity    Alcohol use: No     Comment: stopped drinking 2/2018    Drug use: No   Social History Narrative    -disability--for AFIB    -single, no children    -lives alone    -no tobacco, no ETOH, no cannabis, no illicits    -no ETOh since Jan 2020               Review of Systems    Positive for stated complaint: mechanical fall, right knee pain  Other systems are as noted in HPI.  Constitutional and vital signs reviewed.      All other systems reviewed and negative except as noted above.    Physical Exam     ED Triage Vitals [02/10/24 2010]   BP (!) 137/93   Pulse 110   Resp 24   Temp    Temp src    SpO2 96 %   O2 Device None (Room air)       Current:BP (!) 137/93   Pulse 110   Resp 24   Ht 177.8 cm (5' 10\")   Wt (!) 258.6 kg   SpO2 96%   BMI 81.79 kg/m²         Physical Exam    HEENT; NCAT, EOMI, throat clear, neck supple and nontender, no LAD, no JVD  Heart S1S2 RRR nontender  lungs: CTAB  abd: Soft NT, ND,  NABS without rebound or guarding  Ext superficial mild abrasions noted to his knees bilaterally with tenderness over the lateral margins of his knees bilaterally without significant swelling or deformity.  Remaining extremities are nontender and otherwise neurovascularly intact.    ED Course   Labs Reviewed - No data to display       ED Course as of 02/10/24 2209  ------------------------------------------------------------  Time: 02/10 2157  Comment: While here the patient x-rays of his knees bilaterally.  I do not see any fracture at this time.  Read the radiology port as well.     XR KNEE (1 OR 2 VIEWS), RIGHT (CPT=73560)    Result Date: 2/10/2024  PROCEDURE:  XR KNEE (1 OR 2 VIEWS), RIGHT (CPT=73560)  COMPARISON:  None.  INDICATIONS:  mechanical fall, right knee pain  PATIENT STATED HISTORY: (As transcribed by Technologist)  Patient offered no additional history at this time.               CONCLUSION:   Elongated ossific density noted along the lateral tibial plateau without clear donor site.  This is favored to represent ligamentous ossification.  No definitive acute fracture or traumatic malalignment.  Joint spaces are preserved with tiny posterior patellar osteophytes.  No joint effusion or focal soft tissue swelling.   LOCATION:  [facility]   Dictated  by (CST): Jaylyn Johnson MD on 2/10/2024 at 9:49 PM     Finalized by (CST): Jaylyn Johnson MD on 2/10/2024 at 9:51 PM       XR KNEE (1 OR 2 VIEWS), LEFT (CPT=73560)    Result Date: 2/10/2024  PROCEDURE:  XR KNEE (1 OR 2 VIEWS), LEFT (CPT=73560)  COMPARISON:  None.  INDICATIONS:  mechanical fall, right knee pain  PATIENT STATED HISTORY: (As transcribed by Technologist)  Patient offered no additional history at this time.               CONCLUSION:   No acute fracture or malalignment.  Normal mineralization.  Joint spaces are preserved.  No joint effusion or focal soft tissue swelling.   LOCATION:  [facility]   Dictated by (CST): Jaylyn Johnson MD on 2/10/2024 at 9:09 PM     Finalized by (CST): Jaylyn Johnson MD on 2/10/2024 at 9:09 PM        Medications   ketorolac (Toradol) 60 MG/2ML IM injection 60 mg (60 mg Intramuscular Given 2/10/24 2027)       While here we attempted to have the patient ambulate after pain medications were given.  We even used a walker and this was unsuccessful.         MDM      Differential diagnosis did include knee fracture but not limited such.  Patient has contusions but is unable to ambulate even with a walker and pain medications at this time the patient be admitted for further management.  He may need rehabilitation      This note was prepared using Dragon Medical voice recognition dictation software.  As a result errors may occur.  When identified to these areas have been corrected.  While every attempt is made to correct errors during dictation discrepancies may still exist.  Please contact if there are any errors.  Admission disposition: 2/10/2024 10:07 PM                                        Medical Decision Making      Disposition and Plan     Clinical Impression:  1. Contusion of knee, unspecified laterality, initial encounter    2. Inability to ambulate due to knee         Disposition:  Admit  2/10/2024 10:07 pm    Follow-up:  Jenna Gutierrez MD  [address]  [address]  [phone]    Schedule an appointment as soon as possible for a visit in 3 day(s)            Medications Prescribed:  Current Discharge Medication List                            Hospital Problems       Present on Admission  Date Reviewed: 12/14/2021            ICD-10-CM Noted POA    * (Principal) Contusion of knee, unspecified laterality, initial encounter S80.00XA 2/10/2024 Unknown

## 2024-02-11 NOTE — PLAN OF CARE
A&Ox4 . VSS. On room air. . Hx AYANNA - telemetry monitoring. Tolerating diet. Last BM 2/9. Voiding. Pain managed with PO and IV medication. NWB to BLE. Plan is for MRI of bilateral knees, screening form completed. Patient updated and in agreement with plan of care. Safety precautions in place. Instructed patient to call for assistance, call light within reach.

## 2024-02-11 NOTE — H&P
RIKA  HOSPITALIST  History and Physical     Deneen Chapa Patient Status:  Observation    1965 MRN [id number]   [facility] 3SW-A Attending Ariadna Coburn MD   Hosp Day # 0 PCP Jenna Gutierrez MD     Chief Complaint:   S/p fall with knee pain    History of Present Illness: Deneen Chapa is a 58 year old male with PMH significant for CHF, depression/anxiety, HTN, DL, AYANNA, morbid obesity presents to the ED after a mechanical fall.  Patient slipped on now complaining of bilateral knee pain.  Denies any head pain or trauma.  No back pain or neck pain.  Says he was wearing leather sandals that caught on the ground.  In the ED routine labs show no abnormalities.  X-rays of the knees negative for acute fracture or dislocation.  Patient admitted for pain control and difficulty ambulating.    Past Medical History:  Past Medical History:   Diagnosis Date    Alcohol abuse     last drink 2020    Anxiety     [facility]    Arrhythmia     Atrial fibrillation (HCC)     EP cards: Dr. Sumeet Colon, PAroxysmal AFIB    Calculus of kidney     Congestive heart failure (CHF) (HCC)     Depression     [facility] psych: Dr. Denny--currently on wellbutrin as of 3/28/22, tried zoloft and didn't help    High blood pressure     Hyperlipidemia     Morbid obesity (HCC)     Obstructive sleep apnea     Prediabetes     Sleep apnea     Vertigo     Dr. Kallie Acevedo----Mirtazepine        Past Surgical History:   Past Surgical History:   Procedure Laterality Date    APPENDECTOMY      COLONOSCOPY      JAW SURGERY      OTHER SURGICAL HISTORY  2018,     cardioversion, Dr. Sumeet Colon---    OTHER SURGICAL HISTORY  2019    Cysto-Dr. Timmons       Social History:  reports that he has never smoked. He has never used smokeless tobacco. He reports that he does not drink alcohol and does not use drugs.    Family History:   Family History   Problem Relation Age of Onset    Aldara Father     No Known Problems Mother          Allergies: No Known Allergies    Medications:    No current facility-administered medications on file prior to encounter.     Current Outpatient Medications on File Prior to Encounter   Medication Sig Dispense Refill    traZODone 50 MG Oral Tab Take 4 tablets (200 mg total) by mouth nightly.      dilTIAZem HCl ER Coated Beads 360 MG Oral Capsule SR 24 Hr Take 1 capsule (360 mg total) by mouth daily.      lisinopril 5 MG Oral Tab Take 1 tablet (5 mg total) by mouth daily. 30 tablet 0    furosemide 20 MG Oral Tab Take 1 tablet (20 mg total) by mouth daily. 30 tablet 0    TRAMADOL HCL  MG Oral Tablet 24 Hr TAKE 1 TABLET BY MOUTH 3  TIMES DAILY AS NEEDED FOR  PAIN 30 tablet 0    metoprolol succinate 100 MG Oral Tablet 24 Hr Take 1 tablet (100 mg total) by mouth 2 (two) times a day. 180 tablet 0    Magnesium 100 MG Oral Cap Take 1 tablet by mouth 2 (two) times a day.      Rivaroxaban 20 MG Oral Tab Take 1 tablet (20 mg total) by mouth daily with food.      zolpidem 10 MG Oral Tab Take 1 tablet (10 mg total) by mouth nightly as needed for Sleep. 20 tablet 0       Review of Systems:   A comprehensive 14 point review of systems was completed.    Pertinent positives and negatives noted in the HPI.    Physical Exam:    /70 (BP Location: Right arm)   Pulse 88   Temp 97.9 °F (36.6 °C) (Oral)   Resp 18   Ht 5' 10\" (1.778 m)   Wt (!) 570 lb (258.6 kg)   SpO2 97%   BMI 81.79 kg/m²   General: No acute distress. Alert and oriented x 3.  HEENT: Normocephalic atraumatic. Moist mucous membranes. EOM-I. PERRLA. Anicteric.  Neck: No lymphadenopathy. No JVD. No carotid bruits.  Respiratory: Clear to auscultation bilaterally. No wheezes. No rhonchi.  Cardiovascular: S1, S2. Regular rate and rhythm. No murmurs, rubs or gallops. Equal pulses.   Chest and Back: No tenderness or deformity.  Abdomen: Soft, nontender, nondistended.  Positive bowel sounds. No rebound, guarding or organomegaly.  Neurologic: No focal  neurological deficits. CNII-XII grossly intact.  Musculoskeletal: Moves all extremities.  Extremities: No edema or cyanosis.  Integument: No rashes or lesions.   Psychiatric: Appropriate mood and affect.      Diagnostic Data:      Labs:  Recent Labs   Lab 02/11/24  0501   WBC 9.4   HGB 14.7   MCV 93.8   .0       Recent Labs   Lab 02/11/24  0501   *   BUN 15   CREATSERUM 0.86   CA 8.8      K 4.2      CO2 25.0       Estimated Creatinine Clearance: 95.5 mL/min (based on SCr of 0.86 mg/dL).    No results for input(s): \"PTP\", \"INR\" in the last 168 hours.    COVID-19 Lab Results    COVID-19  Lab Results   Component Value Date    COVID19 Not Detected 04/07/2023    COVID19 Not Detected 01/13/2022    COVID19 Not Detected 12/09/2021       Pro-Calcitonin  No results for input(s): \"PCT\" in the last 168 hours.    Cardiac  No results for input(s): \"TROP\", \"PBNP\" in the last 168 hours.    Creatinine Kinase  No results for input(s): \"CK\" in the last 168 hours.    Inflammatory Markers  No results for input(s): \"CRP\", \"DEEPAK\", \"LDH\", \"DDIMER\" in the last 168 hours.    No results for input(s): \"TROP\", \"TROPHS\", \"CK\" in the last 168 hours.    Imaging: Imaging data reviewed in Epic.      ASSESSMENT / PLAN:   Deneen Chapa is a 58 year old male with PMH significant for CHF, depression/anxiety, HTN, DL, AYANNA, morbid obesity presents to the ED after a mechanical fall.  Patient slipped on now complaining of bilateral knee pain.    Mechanical fall w/ shannon knee pain  -no fractures or dislocation on xray  -pain control as ordered  -supportive measure  -PT/OT assessment pending  -may need CT assessment/MRI right knee specifically, will defer to ortho  -pt unable to stand, knee buckling >> ortho consulted    Supermorbid obese  -spent long discussion with pt regarding wt loss and need to reduce wt asap  -he briefly tried metformin   -has not tried any new meds or d/w PCP   -would encourage a strict change in diet and if  possible/qualifies/affords new injectable wt loss meds such as ozempic/wegovy/mounjaro    HTN  -resume home meds    DL  -statin    AYANNA  -cpap     Depression/anxiety  -resume home meds      Quality:  DVT Prophylaxis: lovenox scds  CODE status: full code  Medeiros: no  If COVID testing is negative, may discontinue isolation: yes     Plan of care discussed with patient and all questions answered.    DISPO  Pain control  Ortho consult      Ariadna Coburn MD  [facility] Hospitalist  Pager [phone]  Answering Service number: [phone]

## 2024-02-11 NOTE — PHYSICAL THERAPY NOTE
PHYSICAL THERAPY EVALUATION - INPATIENT     Room Number: 365/365-A  Evaluation Date: 2/11/2024  Type of Evaluation: Initial  Physician Order: PT Eval and Treat    Presenting Problem: Fall, bilateral knee pain  Co-Morbidities : Morbid obesity, chronic afib, chronic diastolic CHF, HTN, HLD, DM2, AYANNA  Reason for Therapy: Mobility Dysfunction and Discharge Planning    PHYSICAL THERAPY ASSESSMENT   Patient is currently functioning below baseline with bed mobility, transfers, gait, standing prolonged periods, and performing household tasks.  Prior to admission, patient's baseline is independent.  Patient is requiring dependent as a result of the following impairments: decreased functional strength, decreased endurance/aerobic capacity, pain, decreased muscular endurance, and limited knee ROM.  Physical Therapy will continue to follow for duration of hospitalization.    Patient will benefit from continued skilled PT Services to promote return to prior level of function and safety with continuous assistance and gradual rehabilitative therapy .    PLAN  PT Treatment Plan: Bed mobility;Body mechanics;Endurance;Energy conservation;Family education;Patient education;Gait training;Balance training;Transfer training;Strengthening;Range of motion  Rehab Potential : Guarded  Frequency (Obs): 3x/week  Number of Visits to Meet Established Goals: 5      CURRENT GOALS    Goal #1 Patient is able to demonstrate supine - sit EOB @ level: modified independent     Goal #2 Patient is able to demonstrate transfers EOB to/from Chair/Wheelchair at assistance level: moderate assistance     Goal #3 Patient is able to ambulate 10 feet with assist device: walker - rolling at assistance level: moderate assistance     Goal #4    Goal #5    Goal #6    Goal Comments: Goals established on 2/11/2024      PHYSICAL THERAPY MEDICAL/SOCIAL HISTORY  History related to current admission: Patient is a 58 year old male admitted on 2/10/2024 from home for  fall.  Pt diagnosed with bilateral knee pain.      HOME SITUATION  Type of Home: Apartment   Home Layout: One level  Stairs to Enter : 0             Lives With: Alone     Patient Owned Equipment: None       Prior Level of [place]: Pt typically indep with ADLs and mobility. Gets short of breath with increased activity.     SUBJECTIVE  \"I stood in the ED\"       OBJECTIVE  Precautions: Bed/chair alarm  Fall Risk: High fall risk    WEIGHT BEARING RESTRICTION  Weight Bearing Restriction: None                PAIN ASSESSMENT  Rating: 10  Location: bilateral knees  Management Techniques: Relaxation    COGNITION  Overall Cognitive Status:  WFL - within functional limits    RANGE OF MOTION AND STRENGTH ASSESSMENT  See OT note for UE assessment    Lower extremity ROM is within functional limits - painful bilateral knee ROM    Lower extremity strength is grossly 3+/5, limited by pain     BALANCE  Static Sitting: Fair +  Dynamic Sitting: Fair +  Static Standing: Not tested  Dynamic Standing: Not tested    ADDITIONAL TESTS                                    ACTIVITY TOLERANCE                         O2 WALK       NEUROLOGICAL FINDINGS                        AM-PAC '6-Clicks' INPATIENT SHORT FORM - BASIC MOBILITY  How much difficulty does the patient currently have...  Patient Difficulty: Turning over in bed (including adjusting bedclothes, sheets and blankets)?: A Little   Patient Difficulty: Sitting down on and standing up from a chair with arms (e.g., wheelchair, bedside commode, etc.): Unable   Patient Difficulty: Moving from lying on back to sitting on the side of the bed?: A Little   How much help from another person does the patient currently need...   Help from Another: Moving to and from a bed to a chair (including a wheelchair)?: Total   Help from Another: Need to walk in hospital room?: Total   Help from Another: Climbing 3-5 steps with a railing?: Total       AM-PAC Score:  Raw Score: 10   Approx Degree of  Impairment: 76.75%   Standardized Score (AM-PAC Scale): 32.29   CMS Modifier (G-Code): CL    FUNCTIONAL ABILITY STATUS  Gait Assessment   Functional Mobility/Gait Assessment  Gait Assistance: Not tested    Skilled Therapy Provided     Bed Mobility:  Rolling: NT  Supine to sit: NT   Sit to supine: CGA     Transfer Mobility:  Sit to stand: maxA x2 Partial stand  Stand to sit: dependent   Gait = NT    Therapeutic Activity:   Pt able to scoot anteriorly and posteriorly for improved sitting position   Pt cued on placement of UE and LEs for optimal force generation and safe STS transfer.   Therapy PCT present to stabilize walker - walker still with increased elevation   Pt able to obtain partial standing with max therapist assist bilaterally, pt with ?R knee buckle - fall back onto bed. Deferred any further attempts at standing due to safety concerns.   Pt able to return to supine with CGA - able to independently boost himself with usage of BUEs pulling on headboard rails     Recommend total lift for transfers     Therapist's Comments: RN cleared for session. Pt agreeable for therapy, received sitting EOB. Instructed to call for nursing staff for any needs and OOB mobility.     Exercise/Education Provided:  Bed mobility  Body mechanics  Energy conservation  Functional activity tolerated  Posture  Transfer training    Patient End of Session: In bed;Needs met;Call light within reach;RN aware of session/findings;All patient questions and concerns addressed;Alarm set;Ice applied      Patient Evaluation Complexity Level:  History High - 3 or more personal factors and/or co-morbidities   Examination of body systems Low - addressing 1-2 elements   Clinical Presentation Low - Stable   Clinical Decision Making Low - Stable       PT Session Time: 30 minutes  Therapeutic Activity: 20 minutes

## 2024-02-11 NOTE — ED QUICK NOTES
Road test attempted. Pt was able to move legs over to sit at edge of bed. Right knee ace wrapped for support, provided walker. Attempted to stand up with 3 staff assist, pt was unable to, states he has too much pain. Pt lives alone at home. MD notified.

## 2024-02-11 NOTE — DISCHARGE INSTRUCTIONS
Knee immobilizers on indefinitely.  Non weight bearing to right lower extremity.  Weightbearing as tolerated to left lower extremity with knee immobilizer on per Dr. Cao     Cleared to continue ankle pumps as well as work on active and passive knee range of motion as he is able though would do so in a nonweightbearing manner     Recommend bilateral knee MRI to better evaluate for ligamentous injury/injury burden     Follow up with orthopedic surgeon Dr. Cao as needed

## 2024-02-11 NOTE — PLAN OF CARE
2340: Page sent to Dr. Larry Ordoñez hospitalist regarding patient hypotensive this morning. Denying any symptoms.

## 2024-02-11 NOTE — CONSULTS
[place]    Report of Consultation    Deneen Chapa Patient Status:  Observation    1965 MRN [id number]   Location [facility] 3SW-A Attending Ariadna Coburn MD   Hosp Day # 0 PCP Jenna Gutierrez MD     Date of Admission:  2/10/2024  Date of Consult:  24    Reason for Consultation:  Bilateral knee pain following fall    History of Present Illness:  Deneen Chapa is a a(n) 58 year old male who presented for evaluation of bilateral knee pain following a fall suffered yesterday.  Patient states that he tripped over carpet at his home and had fall from standing after which she had immediate onset right worse than left knee pain.  He states that there was a coronal plane force transferred across the knees as a result of the fall.  He denies any numbness, tingling distally in the extremity.  He denies any feelings of coolness or temperature variance distally in the extremities bilaterally.  He has no known prior history of injury to the knees.  He attempted to work with physical therapy today but upon attempting to put weight on the extremities he felt as though the right leg was extremely unstable through the knee and had discomfort in the left.  He is unable to progress and as such is consulted for further evaluation.    History:  Past Medical History:   Diagnosis Date    Alcohol abuse     last drink 2020    Anxiety     [facility]    Arrhythmia     Atrial fibrillation (HCC)     EP cards: Dr. Sumeet Colon, PAroxysmal AFIB    Calculus of kidney     Congestive heart failure (CHF) (HCC)     Depression     [facility] psych: Dr. Denny--currently on wellbutrin as of 3/28/22, tried zoloft and didn't help    High blood pressure     Hyperlipidemia     Morbid obesity (HCC)     Obstructive sleep apnea     Prediabetes     Sleep apnea     Vertigo     Dr. Kallie Acevedo----Mirtazepine     Past Surgical History:   Procedure Laterality Date    APPENDECTOMY      COLONOSCOPY      JAW SURGERY      OTHER  SURGICAL HISTORY  02/2018, 2020    cardioversion, Dr. Sumeet Colon---2020    OTHER SURGICAL HISTORY  05/07/2019    Cysto-Dr. Timmons     Family History   Problem Relation Age of Onset    Aldara Father     No Known Problems Mother       reports that he has never smoked. He has never used smokeless tobacco. He reports that he does not drink alcohol and does not use drugs.    Allergies:  No Known Allergies    Medications:    Current Facility-Administered Medications:     HYDROmorphone (Dilaudid) 1 MG/ML injection 0.2 mg, 0.2 mg, Intravenous, Q2H PRN **OR** HYDROmorphone (Dilaudid) 1 MG/ML injection 0.4 mg, 0.4 mg, Intravenous, Q2H PRN **OR** HYDROmorphone (Dilaudid) 1 MG/ML injection 0.8 mg, 0.8 mg, Intravenous, Q2H PRN    morphINE PF 2 MG/ML injection 1 mg, 1 mg, Intravenous, Q2H PRN **OR** morphINE PF 2 MG/ML injection 2 mg, 2 mg, Intravenous, Q2H PRN **OR** morphINE PF 4 MG/ML injection 4 mg, 4 mg, Intravenous, Q2H PRN    melatonin tab 3 mg, 3 mg, Oral, Nightly PRN    polyethylene glycol (PEG 3350) (Miralax) 17 g oral packet 17 g, 17 g, Oral, Daily PRN    sennosides (Senokot) tab 17.2 mg, 17.2 mg, Oral, Nightly PRN    bisacodyl (Dulcolax) 10 MG rectal suppository 10 mg, 10 mg, Rectal, Daily PRN    fleet enema (Fleet) 7-19 GM/118ML rectal enema 133 mL, 1 enema, Rectal, Once PRN    ondansetron (Zofran) 4 MG/2ML injection 4 mg, 4 mg, Intravenous, Q6H PRN    prochlorperazine (Compazine) 10 MG/2ML injection 5 mg, 5 mg, Intravenous, Q8H PRN    acetaminophen (Tylenol Extra Strength) tab 500 mg, 500 mg, Oral, Q4H PRN    traMADol (Ultram) tab 50 mg, 50 mg, Oral, Q6H PRN    dilTIAZem ER (Dilacor XR) 24 hr cap 360 mg, 360 mg, Oral, Daily    furosemide (Lasix) tab 20 mg, 20 mg, Oral, Daily    lisinopril (Prinivil; Zestril) tab 5 mg, 5 mg, Oral, Daily    metoprolol succinate ER (Toprol XL) 24 hr tab 100 mg, 100 mg, Oral, 2x Daily(Beta Blocker)    rivaroxaban (Xarelto) tab 20 mg, 20 mg, Oral, Daily with food    traZODone (Desyrel)  tab 200 mg, 200 mg, Oral, Nightly    Physical Exam:    General: Alert, orientated x3.  Cooperative.  No apparent distress.  Vital Signs:  Blood pressure 106/69, pulse 77, temperature 97.8 °F (36.6 °C), temperature source Oral, resp. rate 18, height 5' 10\" (1.778 m), weight (!) 570 lb (258.6 kg), SpO2 97%.  BLE:  No gross deformity, skin intact.  There is swelling present across the right knee as well as a 2+ right knee effusion.  He has limitations of bilateral knee range of motion and is unable to tolerate flexion past 30 degrees.  On the left knee he has tenderness along the MCL tract with 2+ widening and pain to valgus stress.  On the right knee has tenderness laterally along the tibial plateau eminence and has 2+ instability to varus stress.  Difficult to assess Lachman's exam on bilateral knees due to discomfort but concern for laxity on the right lower extremity.  He has sensation intact distally as well as appropriate firing of motor myotomes distally.  Difficult to assess DP and PT pulses due to habitus but appropriate appearance and capillary refill to lower extremities bilaterally.    Laboratory Data:  Radiographs of the left knee demonstrates no acute fracture, osseous abnormality, or dislocation.    2 views of the right knee demonstrate abnormality the lateral plateau concerning for possible anterior lateral capsular avulsion (Segond fracture) consistent with possible anterior cruciate ligament tear.  No discrete fracture of the plateau visualized.    Impression and Plan:  Patient Active Problem List   Diagnosis    Alcohol abuse    Atrial fibrillation ([facility])    Hyperlipidemia, unspecified hyperlipidemia type    Morbid obesity with BMI of 60.0-69.9, adult ([facility])    Recurrent major depressive disorder, in full remission ([facility])    Sensorineural hearing loss (SNHL) of both ears    Obstructive sleep apnea    Chronic diastolic CHF (congestive heart failure) ([facility])    Secondary hypercoagulable state ([facility])     Vestibular migraine    Primary hypertension    Acute chest pain    FRANKS (dyspnea on exertion)    Azotemia    Hyperglycemia    Atrial fibrillation, chronic (HCC)    Contusion of knee, unspecified laterality, initial encounter    Inability to ambulate due to knee       58-year-old male with morbid obesity and several other comorbidities who suffered a fall from standing yesterday with significant bilateral lower extremity pain through the knees particular the right lower extremity.    -Had a long discussion with the patient today that at his elevated BMI with his injury mechanism I have significant concern for ultralow velocity knee dislocation, particularly to the right knee.  We discussed that the left knee is likely more consistent with an isolated MCL sprain.  I would recommend bilateral knee MRI and right knee CT scan to better evaluate for ligamentous injury/injury burden as well as rule out evidence of tibial plateau fracture of the right knee given the irregularity seen on right knee radiograph.  -Nonweightbearing bilateral lower extremity  -Knee immobilizer bilateral lower extremity  -Cleared to continue ankle pumps as well as work on active and passive knee range of motion as he is able though would do so in a nonweightbearing manner  -Will continue to follow, further recommendations pending imaging findings    Sumeet Cao MD  2/11/2024  2:29 PM

## 2024-02-11 NOTE — ED QUICK NOTES
Pt wanting to attempt to walk again. Pt was able to stand putting pressure on his left leg but states it feels like his right knee will give out. Pt still unable to ambulate.

## 2024-02-11 NOTE — PLAN OF CARE
0039: Page sent to [facility] hospitalist Dr. Juarez regarding patient's HR ranging from 110's-140's on tele a-fib rhythm after Ditalizem and Toprol XL admin. Awaiting response.    0040: Response back from Dr. Juarez stating if BP okay then continue to monitor HR. Current /51, patient denying cardiac symptoms at this time.

## 2024-02-11 NOTE — OCCUPATIONAL THERAPY NOTE
OCCUPATIONAL THERAPY EVALUATION - INPATIENT     Room Number: 365/365-A  Evaluation Date: 2/11/2024  Type of Evaluation: Initial  Presenting Problem: fall, B knee pain    Physician Order: IP Consult to Occupational Therapy  Reason for Therapy: ADL/IADL Dysfunction and Discharge Planning    OCCUPATIONAL THERAPY ASSESSMENT   Patient is currently functioning below baseline with toileting, bathing, lower body dressing, bed mobility, transfers, dynamic standing balance, functional standing tolerance, and and standing ADLs . Prior to admission, patient's baseline is independent with increased time due to baseline SOB.  Patient is requiring  total assist for all LB/standing ADLs and all transfers, unable to tolerate RLE weightbearing to stand  as a result of the following impairments: decreased endurance, pain, and decreased muscular endurance. Occupational Therapy will continue to follow for duration of hospitalization.    Patient will benefit from continued skilled OT Services to promote return to prior level of function and safety with continuous assistance and gradual rehabilitative therapy       History Related to Current Admission: Patient is a 58 year old male admitted on 2/10/2024 with Presenting Problem: fall, B knee pain. B knee xrays negative for fracture. Patient reports no history of knee pain or buckling. Co-Morbidities : Morbid obesity, chronic afib, chronic diastolic CHF, HTN, HLD, DM2, AYANNA    WEIGHT BEARING RESTRICTION  Weight Bearing Restriction: None                Recommendations for nursing staff:   Transfers: total lift  Toileting location: bed level    EVALUATION SESSION:  Patient Start of Session: sitting EOB  FUNCTIONAL TRANSFER ASSESSMENT  Sit to Stand: Edge of Bed  Edge of Bed: Dependent (max assist x2 plus assist of 3rd to stabilize RW to achieve partial stand; upon attempting progression to full stand, patient with severe increase of pain, threw self back suddenly towards bed and required total  x2 assist to safely control descent to bed)    BED MOBILITY  Sit to Supine (OT): Supervision (increased time/pain, heavy use of bedrails)    BALANCE ASSESSMENT  Static Sitting: Modified Independent  Sitting Unilateral: Supervision  Static Standing: Dependent    FUNCTIONAL ADL ASSESSMENT       ACTIVITY TOLERANCE: severely limited by pain with RLE WB this session, may benefit from ortho consult; RN aware, also discussed with hospitalist                         O2 SATURATIONS       COGNITION  Attention Span:  appears intact  Orientation Level:  oriented x4  Following Commands:  follows one step commands consistently and follows multistep commands consistently  Noted to have some difficulty with right/left and front/back discrimination this session (x4 demos - saying left when meant right, saying back when meant front), no other cognitive deficits noted; patient reports feels at baseline cognitively. Will continue to monitor.     Upper Extremity   ROM: within functional limits   Strength: within functional limits     EDUCATION PROVIDED  Patient: Role of Occupational Therapy; Discharge Recommendations; Functional Transfer Techniques; Fall Prevention; Compensatory ADL Techniques  Patient's Response to Education: Requires Further Education; Verbalized Understanding    Equipment used: RW  Demonstrates functional use, Would benefit from additional trial      Therapist comments: Patient received sitting EOB, good sitting balance, highly motivated for therapy. Patient reporting 10/10 pain R knee (also pain in L knee, but not as severe), had recently received pain meds, agreeable to attempt standing; patient achieved partial stand from edge of bed (height slightly raised) with max x2 assist plus assist of additional 3rd person for RW stabilization; patient requesting \"push me up!\" to assist with transition from partial to full stand, however upon therapists assisting patient towards full stand, patient with severe increase in  pain with the increase on WB on RLE, patient buckling, shouting out, throwing self back towards bed; total assist x2 required to safely control descent back to laying on bed. Patient then able to return self to supine with supervision, increased time/effort, and heavy use of bedrails, also able to scoot self to HOB with use of bedrails and bed in Trendelenburg position. RN aware of session, also discussed with hospitalist (arrived shortly after session) who reports will put in Ortho consult. Will continue to follow.     Patient End of Session: In bed;Needs met;Call light within reach;RN aware of session/findings;All patient questions and concerns addressed;Ice applied;Alarm set    OCCUPATIONAL PROFILE    HOME SITUATION  Type of Home: Apartment  Home Layout: One level  Lives With: Alone (states possibly can find someone to stay with him at discharge but unsure)    Toilet and Equipment: Standard height toilet (reports sturdy sink adjacent)  Shower/Tub and Equipment: Tub-shower combo  Other Equipment: None    Occupation/Status: not currently working        Patient Regularly Uses: Glasses    Prior Level of Function: Patient reports independent in all I/ADL and functional mobility without device prior to admission. States was requiring increased time due to baseline decreased endurance/SOB, but managed everything independently.    SUBJECTIVE   \"Given the gumption that I have and the desire to get better, how do you think I will do recovering from this?\" Patient highly motivated but highly limited by pain this session.    PAIN ASSESSMENT  Rating: 10  Location: B Knees, R worse than L - 10/10 at rest, increasing severely with attempt to stand  Management Techniques: Activity promotion;Body mechanics;Breathing techniques;Relaxation;Repositioning;Ice;Nurse notified    OBJECTIVE  Precautions: Bed/chair alarm  Fall Risk: High fall risk      ASSESSMENTS    AM-PAC ‘6-Clicks’ Inpatient Daily Activity Short Form  -   Putting on  and taking off regular lower body clothing?: Total  -   Bathing (including washing, rinsing, drying)?: A Lot  -   Toileting, which includes using toilet, bedpan or urinal? : Total  -   Putting on and taking off regular upper body clothing?: A Little  -   Taking care of personal grooming such as brushing teeth?: A Little  -   Eating meals?: None    AM-PAC Score:  Score: 14  Approx Degree of Impairment: 59.67%  Standardized Score (AM-PAC Scale): 33.39    ADDITIONAL TESTS     NEUROLOGICAL FINDINGS      COGNITION ASSESSMENTS       PLAN  OT Treatment Plan: Balance activities;ADL training;Functional transfer training;Endurance training;Patient/Family education;Patient/Family training;Compensatory technique education  Rehab Potential : Good  Frequency: 3x/week  Number of Visits to Meet Established Goals: 5    ADL Goals   Patient will perform lower body dressing:  with mod assist  Patient will perform toileting: with mod assist    Functional Transfer Goals  Patient will transfer from supine to sit:  with supervision  Patient will transfer from sit to stand:  with mod assist  Patient will tolerate sidesteps along edge of bed with min x2 for safety in preparation for commode transfer [progress as tolerated when no longer buckling]    Patient Evaluation Complexity Level:   Occupational Profile/Medical History LOW - Brief history including review of medical or therapy records    Specific performance deficits impacting engagement in ADL/IADL LOW  1 - 3 performance deficits    Client Assessment/Performance Deficits LOW - No comorbidities nor modifications of tasks    Clinical Decision Making LOW - Analysis of occupational profile, problem-focused assessments, limited treatment options    Overall Complexity LOW     OT Session Time: 30 minutes  Therapeutic Activity: 10 minutes

## 2024-02-11 NOTE — ED INITIAL ASSESSMENT (HPI)
Pt reports he tripped on an area rug. States both knees flared out and hyper extended. Pt called 911 for lift assist, when pt was up he reported severe right knee pain and states his left knee also hurts. Pt reports typically he ambulates independently.

## 2024-02-12 RX ORDER — OXYCODONE HYDROCHLORIDE 5 MG/1
5 TABLET ORAL EVERY 4 HOURS PRN
Status: DISCONTINUED | OUTPATIENT
Start: 2024-02-12 | End: 2024-02-15

## 2024-02-12 RX ORDER — OXYCODONE HYDROCHLORIDE 10 MG/1
10 TABLET ORAL EVERY 4 HOURS PRN
Status: DISCONTINUED | OUTPATIENT
Start: 2024-02-12 | End: 2024-02-16

## 2024-02-12 RX ORDER — HYDROCODONE BITARTRATE AND ACETAMINOPHEN 10; 325 MG/1; MG/1
1 TABLET ORAL EVERY 4 HOURS PRN
Status: DISCONTINUED | OUTPATIENT
Start: 2024-02-12 | End: 2024-02-12

## 2024-02-12 RX ORDER — HYDROCODONE BITARTRATE AND ACETAMINOPHEN 10; 325 MG/1; MG/1
2 TABLET ORAL EVERY 4 HOURS PRN
Status: DISCONTINUED | OUTPATIENT
Start: 2024-02-12 | End: 2024-02-12

## 2024-02-12 NOTE — PROGRESS NOTES
[facility]    Progress Note    Deneenhank Chapa Patient Status:  Observation    1965 MRN [id number]   Location [facility] 3SW-A Attending Ariadna Coburn MD   Hosp Day # 0 PCP Jenna Gutierrez MD     SUBJECTIVE:  Interval History: Patient has no current complaints.  Pain: controlled at rest.  Patient denies chest pain, shortness of breath and calf pain.    OBJECTIVE:  Blood pressure 135/68, pulse 77, temperature 98 °F (36.7 °C), temperature source Oral, resp. rate 15, height 5' 10\" (1.778 m), weight (!) 570 lb (258.6 kg), SpO2 92%.     General: Alert, orientated x3.  Cooperative.  No apparent distress.  Vital Signs:  Blood pressure 106/69, pulse 77, temperature 97.8 °F (36.6 °C), temperature source Oral, resp. rate 18, height 5' 10\" (1.778 m), weight (!) 570 lb (258.6 kg), SpO2 97%.  BLE:  No gross deformity, skin intact.  There is swelling present across the right knee as well as a 2+ right knee effusion.  He has limitations of bilateral knee range of motion and is unable to tolerate flexion past 30 degrees.  On the left knee he has tenderness along the MCL tract with 2+ widening and pain to valgus stress.  On the right knee has tenderness laterally along the tibial plateau eminence and has 2+ instability to varus stress.  Difficult to assess Lachman's exam on bilateral knees due to discomfort but concern for laxity on the right lower extremity.  He has sensation intact distally as well as appropriate firing of motor myotomes distally.  Difficult to assess DP and PT pulses due to habitus but appropriate appearance and capillary refill to lower extremities bilaterally.    Data Review:  Radiographs of the left knee demonstrates no acute fracture, osseous abnormality, or dislocation.     2 views of the right knee demonstrate abnormality the lateral plateau concerning for possible anterior lateral capsular avulsion (Segond fracture) consistent with possible anterior cruciate ligament tear.  No discrete  fracture of the plateau visualized.    CT of the right knee dated 2/11/2024 demonstrates nondisplaced fracture of the lateral tibial plateau.  There is no significant joint space depression or evidence of joint space widening.  Fracture fragment predominantly involves the metaphyseal flare without significant intra-articular step-off or involvement.    ASSESSMENT/PLAN:  58-year-old male with morbid obesity and several other comorbidities who suffered a fall from standing yesterday with significant bilateral lower extremity pain through the knees particular the right lower extremity.     -Had a long discussion with the patient today that at his elevated BMI with his injury mechanism I have significant concern for ultralow velocity knee dislocation, particularly to the right knee.  We discussed that the left knee is likely more consistent with an isolated MCL sprain.  I would recommend bilateral knee MRI to better evaluate for ligamentous injury/injury burden  - CT demonstrates nondisplaced fracture to the lateral tibial plateau  -Nonweightbearing bilateral lower extremity  -Knee immobilizer bilateral lower extremity  -Cleared to continue ankle pumps as well as work on active and passive knee range of motion as he is able though would do so in a nonweightbearing manner  -Will continue to follow, further recommendations pending imaging findings    Sumeet Cao MD  2/12/2024  7:20 AM

## 2024-02-12 NOTE — TRANSFER CENTER NOTE
The pt was measured at 77 inches at his widest area per Erick OLMEDO.    Contacted:    [facility]: They do not have bed availability. Notified [facility] of the pt's width. Their MRI can accommodate someone at 70 inches.    [facility]: they are not accepting medical transfers.    [facility]: they are at capacity and not accepting transfers    [facility]: all of their hospitals are at capacity.    [facility]: they cannot accommodate the pt's weight in their MRI. Their physicians will want an MRI. Asked if they can forward the pt information to the ortho on call and they said they would need the MRI and their physicians would  not accept.     [facility]: they cannot accommodate the pt's weight for MRI. [facility] not accepting any transfers.    [facility]: at capacity, did not take pt information.

## 2024-02-12 NOTE — PLAN OF CARE
Patient A&Ox4 . Hx of AYANNA not using cpap . On room air ,tele A-Fib , blood pressure in the 90's . MD aware , decreased dose of cardizem . Pain managed with iv and oral meds ,states norco doesn't help at all . Denies any numbness or tingling , has strong pedal pulses . BL knee swollen R>L , ice offered . MRI BL knee pending . NWB to both legs . PT/OT following . Voiding per urinal . All safety precautions in place ,reminded to \"call don't fall\" . [facility] to deliver bariatric bed in am .

## 2024-02-12 NOTE — PROGRESS NOTES
[facility] Hospitalist Progress Note     CC: Hospital Follow up    PCP: Jenna Gutierrez MD       Assessment/Plan:     Principal Problem:    Contusion of knee, unspecified laterality, initial encounter  Active Problems:    Inability to ambulate due to knee        Deneen Eduard is a 58 year old male with PMH significant for CHF, depression/anxiety, HTN, DL, AYANNA, morbid obesity presents to the ED after a mechanical fall.  Patient slipped on now complaining of bilateral knee pain.     Mechanical fall w/ shannon knee pain  Pain management  -no fractures or dislocation on xray  -Increased pain management regimen to Oxy IR-discussed concerns for respiratory depression given his size and presence of AYANNA.  -supportive measure  -PT/OT assessment pending  -CT of right knee-mildly comminuted and minimally displaced intra-articular fracture along the lateral tibial plateau  - MRI of both knees ordered given significant pain however due to patient's weight, unable to get the MRI at [facility].  Discussed with orthopedic surgery.  Patient would need MRI to further evaluate given his size he is at a risk of ultralow velocity knee dislocation.  Hence MRI is needed to further evaluate ligament injuries, structural integrity of the knee as well as vascular flow.  Recommended transfer to a tertiary care center or to a facility that has accommodation for patient's weight for further care.  Transfer process initiated.  Discussed with social work.  -Orthopedic surgery following, case discussed with Dr. Kiley ANDERSON-fib  - Continue diltiazem, Xarelto    Morbid obesity  -has not tried any new meds or d/w PCP   -would encourage a strict change in diet and if possible/qualifies/affords new injectable wt loss meds such as ozempic/wegovy/mounjaro     HTN  -resume home meds     DL  -statin     AYANNA  -cpap      Depression/anxiety  -resume home meds        Quality:  DVT Prophylaxis: Xarelto  CODE status: full code  Medeiros: no  If COVID testing  is negative, may discontinue isolation: yes      Plan of care discussed with patient and all questions answered.     DISPO  Pending clinical status        Marquis Del Toro DO  Hospitalist  [facility]       Subjective:     Seen and examined at bedside.  Complains of significant knee pain that is not alleviated by Norco 10.    OBJECTIVE:    Blood pressure 120/65, pulse 89, temperature 98.2 °F (36.8 °C), temperature source Oral, resp. rate 18, height 5' 10\" (1.778 m), weight (!) 570 lb (258.6 kg), SpO2 92%.    Temp:  [97.8 °F (36.6 °C)-98.8 °F (37.1 °C)] 98.2 °F (36.8 °C)  Pulse:  [] 89  Resp:  [15-18] 18  BP: (106-143)/(52-83) 120/65  SpO2:  [92 %-96 %] 92 %      Intake/Output:    Intake/Output Summary (Last 24 hours) at 2/12/2024 1520  Last data filed at 2/12/2024 1153  Gross per 24 hour   Intake 510 ml   Output 400 ml   Net 110 ml       Last 3 Weights   02/10/24 2258 (!) 570 lb (258.6 kg)   02/10/24 2010 (!) 570 lb (258.6 kg)   07/15/23 0156 (!) 470 lb (213.2 kg)   06/01/23 1934 (!) 448 lb 12.8 oz (203.6 kg)   06/01/23 1338 (!) 450 lb (204.1 kg)       Exam  Gen: No acute distress, alert and oriented x3, no focal neurologic deficits, morbidly obese  Heent: NC AT, mucous memb clear, neck supple  Pulm: Lungs clear, normal respiratory effort  CV: Heart with regular rate and rhythm  Abd: Abdomen soft, nontender, nondistended, no organomegaly, bowel sounds present  MSK: Right knee with some swelling, extreme pain with movement   skin: no rashes or lesions  Neuro: AO*3, motor intact, no sensory deficits  Psyc: appropriate mood and affect      Data Review:       Labs:     Recent Labs   Lab 02/11/24  0501   RBC 4.69   HGB 14.7   HCT 44.0   MCV 93.8   MCH 31.3   MCHC 33.4   RDW 13.3   WBC 9.4   .0         Recent Labs   Lab 02/11/24  0501   *   BUN 15   CREATSERUM 0.86   EGFRCR 100   CA 8.8      K 4.2      CO2 25.0       No results for input(s): \"ALT\", \"AST\", \"ALB\", \"AMYLASE\", \"LIPASE\",  \"LDH\" in the last 168 hours.    Invalid input(s): \"ALPHOS\", \"TBIL\", \"DBIL\", \"TPROT\"      Imaging:  CT KNEE RIGHT (CIV=35689)    Result Date: 2/11/2024  CONCLUSION:  Mildly comminuted and minimally displaced intra-articular fracture along the lateral tibial plateau.  Small lipohemarthrosis noted.    LOCATION:  [facility]   Dictated by (CST): Da Mitchell MD on 2/11/2024 at 3:28 PM     Finalized by (CST): Da Mitchell MD on 2/11/2024 at 3:30 PM       XR KNEE (1 OR 2 VIEWS), RIGHT (CPT=73560)    Result Date: 2/10/2024  CONCLUSION:   Elongated ossific density noted along the lateral tibial plateau without clear donor site.  This is favored to represent ligamentous ossification.  No definitive acute fracture or traumatic malalignment.  Joint spaces are preserved with tiny posterior patellar osteophytes.  No joint effusion or focal soft tissue swelling.   LOCATION:  [facility]   Dictated by (CST): Jaylyn Johnson MD on 2/10/2024 at 9:49 PM     Finalized by (CST): Jaylyn Johnson MD on 2/10/2024 at 9:51 PM       XR KNEE (1 OR 2 VIEWS), LEFT (CPT=73560)    Result Date: 2/10/2024  CONCLUSION:   No acute fracture or malalignment.  Normal mineralization.  Joint spaces are preserved.  No joint effusion or focal soft tissue swelling.   LOCATION:  [facility]   Dictated by (CST): Jaylyn Johnson MD on 2/10/2024 at 9:09 PM     Finalized by (CST): Jaylyn Johnson MD on 2/10/2024 at 9:09 PM          Meds:      dilTIAZem HCl ER Coated Beads  240 mg Oral Nightly    furosemide  20 mg Oral Daily    lisinopril  5 mg Oral Daily    metoprolol succinate ER  100 mg Oral 2x Daily(Beta Blocker)    rivaroxaban  20 mg Oral Daily with food    traZODone  200 mg Oral Nightly       oxyCODONE **OR** oxyCODONE, HYDROmorphone **OR** HYDROmorphone **OR** HYDROmorphone, morphINE **OR** morphINE **OR** morphINE, melatonin, polyethylene glycol (PEG 3350), sennosides, bisacodyl, fleet enema, ondansetron, prochlorperazine, acetaminophen, traMADol

## 2024-02-12 NOTE — CONGREGATE LIVING REVIEW
[facility] Living Authorization    The [facility] Review Committee has reviewed this case and the patient IS APPROVED for discharge to a facility for Short Term Skilled once the following procedure is followed:     - The physician discharge instructions (contained within the BASIL note for SNF) must inlcude the below appropriate and approved COVID instructions to the facility    For questions regarding [facility] approval process, please contact the CM assigned to the case.  For questions regarding RN discharge workflow, please contact the unit Clinical Leader.

## 2024-02-12 NOTE — PROGRESS NOTES
1157: Spoke to MRI dept. Verified that our weight limit is 550 lbs for our MRI machine. He weighs 570 lbs so pt cannot have an MRI here. Message sent to update team and to request that Dr Cao to please cancel MRI order and he can decide on any further testing/treatments. Awaiting response.    1107: Per Dr Cao: \"Unfortunately the patient cannot be appropriately treated without the MRI as we cannot know for certain if he suffered a knee dislocation and has multi ligament damage or at risk vasculature without this study. Is needed to understand if his injuries require surgical intervention. If we do not have the facilities to accommodate his care he would need to be transferred to an appropriate institution or hospital that is able to provide him a complete workup and care moving forward.\"    1115: Spoke to Dr Del Toro. Gave her Dr Cao' cell number for them to discuss plan of care.      1535: Spoke to Madelin Garcia RN - pt's abdominal girth 77 in wide.     1638: Bed scale weighed pt at 234 kg (approx 516 lbs). Spoke with PCT. Bed scale not zeroed out prior to placing pt in bed. Verified room lift equipment can hold 550 lbs.     1645: Spoke with MRI dept. Informed them that we are trying to obtain an accurate weight for pt. If pt's weight is less than 550 lbs, our MRI equipment should be able to hold him. Informed MRI that we are also looking into lift equipment to be able to get pt from bed to MRI table if under 550 lb limit.    1800: [employer] lift equipment located and brought to outside pt's room. [employer] lift has max of 660 lbs. Need Black label sling for use. Call placed to housekeeping to obtain correct sling.     1930: endorsed to next RN that sling still needs to be obtained. Once sling is obtained, to pleased lift pt, zero out bed and obtain accurate weight. RN to follow up with MRI dept tomorrow.

## 2024-02-12 NOTE — PROGRESS NOTES
Pt states norco 5/325 not controlling his pain. Asked Dr Cao if we could increase to norco 10/325. Per Dr Cao, he will defer to hospitalist to order, but it is ok from his standpoint. Message sent to Dr Coburn. Awaiting response.

## 2024-02-12 NOTE — TRANSFER CENTER NOTE
Received a call from Dr Del Toro for possible transfer to tertiary facility. [facility] cannot accommodate the pt's weight in the MRI.    Contacted: [facility], [facility], [facility], [facility], [facility], [facility] and they cannot accommodate the pt based on his weight of 570 pounds.     1500: Called [facility] back inquiring on a transfer without having the MRI. They are requesting a measurement of the widest part of his body. Contacted Erick OLMEDO requesting this information.

## 2024-02-13 LAB
ANION GAP SERPL CALC-SCNC: 2 MMOL/L (ref 0–18)
BASOPHILS # BLD AUTO: 0.05 X10(3) UL (ref 0–0.2)
BASOPHILS NFR BLD AUTO: 0.6 %
BUN BLD-MCNC: 16 MG/DL (ref 9–23)
CALCIUM BLD-MCNC: 8.4 MG/DL (ref 8.5–10.1)
CHLORIDE SERPL-SCNC: 104 MMOL/L (ref 98–112)
CO2 SERPL-SCNC: 30 MMOL/L (ref 21–32)
CREAT BLD-MCNC: 0.78 MG/DL
EGFRCR SERPLBLD CKD-EPI 2021: 103 ML/MIN/1.73M2 (ref 60–?)
EOSINOPHIL # BLD AUTO: 0.31 X10(3) UL (ref 0–0.7)
EOSINOPHIL NFR BLD AUTO: 3.7 %
ERYTHROCYTE [DISTWIDTH] IN BLOOD BY AUTOMATED COUNT: 13.6 %
GLUCOSE BLD-MCNC: 130 MG/DL (ref 70–99)
HCT VFR BLD AUTO: 41.4 %
HGB BLD-MCNC: 13.6 G/DL
IMM GRANULOCYTES # BLD AUTO: 0.08 X10(3) UL (ref 0–1)
IMM GRANULOCYTES NFR BLD: 0.9 %
LYMPHOCYTES # BLD AUTO: 1.88 X10(3) UL (ref 1–4)
LYMPHOCYTES NFR BLD AUTO: 22.3 %
MCH RBC QN AUTO: 30.8 PG (ref 26–34)
MCHC RBC AUTO-ENTMCNC: 32.9 G/DL (ref 31–37)
MCV RBC AUTO: 93.7 FL
MONOCYTES # BLD AUTO: 1.08 X10(3) UL (ref 0.1–1)
MONOCYTES NFR BLD AUTO: 12.8 %
NEUTROPHILS # BLD AUTO: 5.03 X10 (3) UL (ref 1.5–7.7)
NEUTROPHILS # BLD AUTO: 5.03 X10(3) UL (ref 1.5–7.7)
NEUTROPHILS NFR BLD AUTO: 59.7 %
OSMOLALITY SERPL CALC.SUM OF ELEC: 285 MOSM/KG (ref 275–295)
PLATELET # BLD AUTO: 177 10(3)UL (ref 150–450)
POTASSIUM SERPL-SCNC: 4.4 MMOL/L (ref 3.5–5.1)
RBC # BLD AUTO: 4.42 X10(6)UL
SODIUM SERPL-SCNC: 136 MMOL/L (ref 136–145)
WBC # BLD AUTO: 8.4 X10(3) UL (ref 4–11)

## 2024-02-13 PROCEDURE — 85025 COMPLETE CBC W/AUTO DIFF WBC: CPT | Performed by: STUDENT IN AN ORGANIZED HEALTH CARE EDUCATION/TRAINING PROGRAM

## 2024-02-13 PROCEDURE — 97530 THERAPEUTIC ACTIVITIES: CPT

## 2024-02-13 PROCEDURE — 97112 NEUROMUSCULAR REEDUCATION: CPT

## 2024-02-13 PROCEDURE — 97535 SELF CARE MNGMENT TRAINING: CPT

## 2024-02-13 PROCEDURE — 97110 THERAPEUTIC EXERCISES: CPT

## 2024-02-13 PROCEDURE — 80048 BASIC METABOLIC PNL TOTAL CA: CPT | Performed by: STUDENT IN AN ORGANIZED HEALTH CARE EDUCATION/TRAINING PROGRAM

## 2024-02-13 NOTE — PHYSICAL THERAPY NOTE
PHYSICAL THERAPY TREATMENT NOTE - INPATIENT    Room Number: 365/365-A     Session: 1     Number of Visits to Meet Established Goals: 5    Presenting Problem: Fall, bilateral knee pain  Co-Morbidities : Morbid obesity, chronic afib, chronic diastolic CHF, HTN, HLD, DM2, AYANNA    ASSESSMENT   Patient demonstrates limited progress this session, goals  remain in progress.    Patient continues to function below baseline with bed mobility, transfers, and gait.  Contributing factors to remaining limitations include decreased functional strength, pain, medical status, and NWB BLE  .  Next session anticipate patient to progress bed mobility and transfers.  Physical Therapy will continue to follow patient for duration of hospitalization.    Patient continues to benefit from continued skilled PT services: to promote return to prior level of function and safety with continuous assistance and gradual rehabilitative therapy .    PLAN  PT Treatment Plan: Bed mobility;Body mechanics;Endurance;Energy conservation;Family education;Patient education;Gait training;Balance training;Transfer training;Strengthening;Range of motion  Rehab Potential : Guarded  Frequency (Obs): 3x/week    Prior Level of [place]: Pt typically indep with ADLs and mobility. Gets short of breath with increased activity.        CURRENT GOALS       Goal #1 Patient is able to demonstrate supine - sit EOB @ level: modified independent      Goal #2 Patient is able to demonstrate transfers EOB to/from Chair/Wheelchair at assistance level: moderate assistance      Goal #3 Patient is able to ambulate 10 feet with assist device: walker - rolling at assistance level: moderate assistance  N/a - pt is now NWB BLE       Goal #4     Goal #5     Goal #6     Goal Comments: Goals established on 2/11/2024 2/13/2024 all goals ongoing    SUBJECTIVE  \"Thank you, it feels so much better \" referring to YESSENIA KI     OBJECTIVE  Precautions: Bed/chair alarm    WEIGHT BEARING  RESTRICTION  Weight Bearing Restriction: None                PAIN ASSESSMENT   Rating: Unable to rate  Location: B knees  Management Techniques: Breathing techniques;Repositioning    BALANCE                                                                                                                       Static Sitting: Not tested  Dynamic Sitting: Not tested           Static Standing: Not tested  Dynamic Standing: Not tested    ACTIVITY TOLERANCE                         O2 WALK         AM-PAC '6-Clicks' INPATIENT SHORT FORM - BASIC MOBILITY  How much difficulty does the patient currently have...  Patient Difficulty: Turning over in bed (including adjusting bedclothes, sheets and blankets)?: A Lot   Patient Difficulty: Sitting down on and standing up from a chair with arms (e.g., wheelchair, bedside commode, etc.): Unable   Patient Difficulty: Moving from lying on back to sitting on the side of the bed?: A Lot   How much help from another person does the patient currently need...   Help from Another: Moving to and from a bed to a chair (including a wheelchair)?: Total   Help from Another: Need to walk in hospital room?: Total   Help from Another: Climbing 3-5 steps with a railing?: Total       AM-PAC Score:  Raw Score: 8   Approx Degree of Impairment: 86.62%   Standardized Score (AM-PAC Scale): 28.58   CMS Modifier (G-Code): CM    FUNCTIONAL ABILITY STATUS  Gait Assessment   Functional Mobility/Gait Assessment  Gait Assistance: Not tested    Skilled Therapy Provided  Chart reviewed: per therapist's communication c Dr. Cao, pt is to  be NWB BLE, KI on at all times except when working with therapy and is approved for AROM/PROM BLE and can sit EOB sans KI.     Therapist communicated above with PCT and RN .   Therapist coordinated c pt's PCT to don B KI and confirm size and fit adequate.  Case d/w SW who states pt requires new measurements for MRI- coordinated c RN, and PT aide to have lizbeth sheet placed under pt  per MRI request    Pt educated on NWB, goals for therapy and KI. Pt performed rolling c cues for sequencing and use of bed rail, pt able to assist with scooting up in bed using B hands on head board  Pt cued for rolling L/R for updated measurements.     Pt performed sup therex BLE AAROM /AROM   Therapist donned KI BLE pt left in bed, needs met.      Bed Mobility:  Rolling: mod x 2    Supine<>Sit: nt   Sit<>Supine: nt      Transfer Mobility:  Sit<>Stand: nt    Stand<>Sit: nt    Gait: nt     Therapist's Comments: of note, unable to attempt t/f to EOB as pt now has air mattress off loading bed and surface has no friction, pt states last time he attempted sitting EOB, he almost slipped off.   Pt to have MRI- will continue to follow and update POC as needed       THERAPEUTIC EXERCISES  Lower Extremity Ankle pumps  Glut sets  Hip AB/AD  Heel slides  Knee extension  Quad sets  SLR  AAROM     Upper Extremity OH Reaching and scooting up in bed c head board      Position Supine     Repetitions   5-8   Sets   2     Patient End of Session: In bed;Needs met;Call light within reach;RN aware of session/findings;All patient questions and concerns addressed    PT Session Time:  minutes  Gait Training:  minutes  Therapeutic Activity: 35 minutes  Therapeutic Exercise: 25 minutes   Neuromuscular Re-education: 8 minutes

## 2024-02-13 NOTE — TRANSFER CENTER NOTE
This Transfer RN spoke with Ailin ISRAEL who stated the patient was weighed again today and pt has a weight of 491lbs.     Transfer RN spoke with the follow today 02/13/24:    [facility]: Latasha- on hold for transfers and MRI max weight is 450lbs    [facility]: at Capacity and MRI can't accommodate patient's weight    [facility]: Tahir - Unable to accommodate pt's girth MRI max is 70inches    [facility]: Wendi - At capacity  MRI max is 71 inches.     [facility]: Spoke with Genevieve at [facility] in MRI who stated they measure patient shoulder to shoulder with max of 70cm (27.55 inches)  If the patient needs a MRI of bilateral knee we would need to get Hip to hip measurement.     Also spoke with Ashley at [facility] and [facility] are on transfer hold.

## 2024-02-13 NOTE — PLAN OF CARE
Pt a&O. On room air when awake. Did need O2 2L when napping this afternoon. Tele and  monitoring maintained. Pt states he does not tolerate a cpap machine, but knows he has sleep apnea. Refusing Scds. Discussed importance and high risk of blood clots d/t immobility despite being on xarelto. Pt states he may wear them later this evening. Encouraged pt to do ankle pump exercises as well. Tolerating diet with good appetite. Last BM 2/9/24. Pt did try and sit on bedpan to have a BM this evening but was unsuccessful. Voiding w/o difficulty. Pain managed with current medications. Medications adjusted today. NWB to BLE. KI to BLE. Spoke to Dr Cao, pt does not need to wear the KI while in bed if he does not want to. Still awaiting MRI imaging to determine plan of care. Therapy still to eval pt once ortho eval completed. Bruising noted to rt knee. Otherwise skin is intact. Pt on specialty bed. Pt lives alone and will need insurance authorization for admission to [facility] if pt unable to go home.     Possible transfer if unable to complete MRI at our facility.

## 2024-02-13 NOTE — OCCUPATIONAL THERAPY NOTE
OCCUPATIONAL THERAPY TREATMENT NOTE - INPATIENT     Room Number: 365/365-A  Session: 1   Number of Visits to Meet Established Goals: 5    Presenting Problem: fall, B knee pain  Prior Level of Function: Patient reports independent in all I/ADL and functional mobility without device prior to admission. States was requiring increased time due to baseline decreased endurance/SOB, but managed everything independently.    ASSESSMENT   Patient demonstrates limited progress this session, goals remain in progress.    Patient continues to function below baseline with lower body dressing, bed mobility, transfers, stating sitting balance, dynamic sitting balance, static standing balance, and dynamic standing balance.   Contributing factors to remaining limitations include decreased functional strength, pain, decreased muscular endurance, and medical status.  Next session anticipate patient to progress bed mobility and transfers.  Occupational Therapy will continue to follow patient for duration of hospitalization.    Patient continues to benefit from continued skilled OT services: to promote return to prior level of function and safety with continuous assistance and gradual rehabilitative therapy .            History: Patient is a 58 year old male admitted on 2/10/2024 with Presenting Problem: fall, B knee pain. B knee xrays negative for fracture. Patient reports no history of knee pain or buckling. Co-Morbidities : Morbid obesity, chronic afib, chronic diastolic CHF, HTN, HLD, DM2, AYANNA    WEIGHT BEARING RESTRICTION  Weight Bearing Restriction: None               TREATMENT SESSION:  Patient Start of Session: semi supine in bed  FUNCTIONAL TRANSFER ASSESSMENT  Sit to Stand: Edge of Bed  Edge of Bed: Not Tested    BED MOBILITY  Rolling: Maximum Assist (mod A x 2)  Sit to Supine (OT): Not Tested    BALANCE ASSESSMENT  Static Sitting: Not Tested  Sitting Unilateral: Not Tested  Static Standing: Not Tested    FUNCTIONAL ADL  ASSESSMENT       ACTIVITY TOLERANCE: WFL                         O2 SATURATIONS       EDUCATION PROVIDED  Patient: Role of Occupational Therapy; Plan of Care; Discharge Recommendations  Patient's Response to Education: Verbalized Understanding; Returned Demonstration      THERAPEUTIC EXERCISES  Lower Extremity See PT note.     Upper Extremity Overhead reaching to scoot up to head of bed    With use of green theraband:  Bicep curls  Forward punches  Shoulder raises  Lateral raises    Without theraband  Hand pumps     Position Semi supine     Repetitions 10   Sets 1       Therapist comments: Pt engages in bed mobility rolling side to side requiring mod A x 2 in order to get total lift sling under pt in preparation for MRI as well as get measurements to ensure pt is appropriate for MRI. Pt with good reaching and maintaining side lying. Pt able to assist to HOB by pulling self up with mod A x 2. Following bed mobility, pt provided with theraband and engages in UE exercises to increase strength, ROM, and activity tolerance required for increased ADL performance, bed mobility, and UE function. Pt provided with UE HEP. After session was terminated, Rossy RN reports that MRI requested for pt to be positioned in total sling sheet (was retrieved from SCU to accommodate pt's weight). Pt rolls side to side at mod A x 2 for regular sling to be removed and sheet sling to be placed.   Patient End of Session: In bed;With  staff;Needs met;Call light within reach;RN aware of session/findings;All patient questions and concerns addressed    SUBJECTIVE  \"I like to get some insight on what the plan is during my stay.\"    PAIN ASSESSMENT  Ratin  Location: B knees; R hip  Management Techniques: Activity promotion;Body mechanics;Breathing techniques;Relaxation;Repositioning;Ice;Nurse notified     OBJECTIVE  Precautions: Bed/chair alarm    AM-PAC ‘6-Clicks’ Inpatient Daily Activity Short Form  -   Putting on and taking off regular  lower body clothing?: Total  -   Bathing (including washing, rinsing, drying)?: A Lot  -   Toileting, which includes using toilet, bedpan or urinal? : Total  -   Putting on and taking off regular upper body clothing?: A Little  -   Taking care of personal grooming such as brushing teeth?: A Little  -   Eating meals?: None    AM-PAC Score:  Score: 14  Approx Degree of Impairment: 59.67%  Standardized Score (AM-PAC Scale): 33.39    PLAN  OT Treatment Plan: Balance activities;ADL training;Functional transfer training;Endurance training;Patient/Family education;Patient/Family training;Compensatory technique education  Rehab Potential : Good  Frequency: 3x/week    OT Goals:     All goals ongoing 02/13    ADL Goals   Patient will perform lower body dressing:  with mod assist  Patient will perform toileting: with mod assist     Functional Transfer Goals  Patient will transfer from supine to sit:  with supervision  Patient will transfer from sit to stand:  with mod assist  Patient will tolerate sidesteps along edge of bed with min x2 for safety in preparation for commode transfer [progress as tolerated when no longer buckling]    OT Session Time: 45 minutes  Therapeutic Activity: 25 minutes  Therapeutic Exercise: 20 minutes

## 2024-02-13 NOTE — PLAN OF CARE
Patient is alert and oriented x 4. Vitals stable on room air. AYANNA with no CPAP. Tele (afib). Refused SCD's. Patient reporting severe pain, IV pain meds given with relief. Denies numbness and tingling. Voiding without difficulty using bedpan. Last BM 2/9/24. Tolerating regular diet. Total lift. Plan for MRI of bilateral knee's. Nonwt bearing BLE. Knee immobilizer for tx. Plan of care discussed with patient.

## 2024-02-13 NOTE — PLAN OF CARE
Patient A&Ox4, VSS on RA, , tele; Afib. Patient reports severe pain to bilateral knees; PO and IV pain medications given. Bilateral knee immobilizers in place. NWB to bilateral knees. Patient voiding freely, LBM 2/13. Spoke with MRI regarding patient exam. Patient weight within MRI scan limit and MRI will call RN with updated time. Per MRI, they will use their lizbeth lift to get patient onto MRI table. Plan of care reviewed with patient. Patient demonstrates understanding.

## 2024-02-13 NOTE — PROGRESS NOTES
[facility] Hospitalist Progress Note     CC: Hospital Follow up    PCP: Jenna Gutierrez MD       Assessment/Plan:     Principal Problem:    Contusion of knee, unspecified laterality, initial encounter  Active Problems:    Inability to ambulate due to knee        Deneen Eduard is a 58 year old male with PMH significant for CHF, depression/anxiety, HTN, DL, AYANNA, morbid obesity presents to the ED after a mechanical fall.  Patient slipped on now complaining of bilateral knee pain.     Mechanical fall w/ shannon knee pain  Pain management  -no fractures or dislocation on xray  -Increased pain management regimen to Oxy IR-discussed concerns for respiratory depression given his size and presence of AYANNA.  -supportive measure  -PT/OT assessment pending  -CT of right knee-mildly comminuted and minimally displaced intra-articular fracture along the lateral tibial plateau  - MRI of both knees ordered given significant pain however due to patient's weight.  Earlier discussions suggested that patient's weight would be prohibitive for MRI at [facility].  Transfer process was initiated however today the weight was recalculated and was acceptable for the MRI.  Plan for MRI later today.  -Okay to utilize IV Dilaudid with p.o. regimen for pain control  -Orthopedic surgery following, case discussed with Dr. Kiley Walton A-fib  - Continue diltiazem, Xarelto    Morbid obesity  -has not tried any new meds or d/w PCP   -would encourage a strict change in diet and if possible/qualifies/affords new injectable wt loss meds such as ozempic/wegovy/mounjaro  - Recommend outpatient obesity clinic referral     HTN  -resume home meds     DL  -statin     AYANNA  -cpap      Depression/anxiety  -resume home meds        Quality:  DVT Prophylaxis: Xarelto  CODE status: full code  Medeiros: no  If COVID testing is negative, may discontinue isolation: yes      Plan of care discussed with patient and all questions answered.     DISPO  Pending clinical  status        Marquis Del Toro DO  Hospitalist  [facility]       Subjective:     Seen and examined.  Continued complaints of pain in the right knee.  Able to wiggle toes, pain is unchanged from yesterday despite accelerated pain medication regimen.  Denies any chest pain or shortness of breath.    OBJECTIVE:    Blood pressure 113/62, pulse 88, temperature 98.2 °F (36.8 °C), temperature source Oral, resp. rate 18, height 5' 10\" (1.778 m), weight (!) 491 lb 10 oz (223 kg), SpO2 90%.    Temp:  [97.8 °F (36.6 °C)-98.2 °F (36.8 °C)] 98.2 °F (36.8 °C)  Pulse:  [] 88  Resp:  [16-20] 18  BP: (100-130)/(49-73) 113/62  SpO2:  [90 %-96 %] 90 %      Intake/Output:    Intake/Output Summary (Last 24 hours) at 2/13/2024 1448  Last data filed at 2/13/2024 0341  Gross per 24 hour   Intake 960 ml   Output 900 ml   Net 60 ml       Last 3 Weights   02/13/24 0341 (!) 491 lb 10 oz (223 kg)   02/12/24 1638 (!) 515 lb 14 oz (234 kg)   02/10/24 2258 (!) 570 lb (258.6 kg)   02/10/24 2010 (!) 570 lb (258.6 kg)   07/15/23 0156 (!) 470 lb (213.2 kg)   06/01/23 1934 (!) 448 lb 12.8 oz (203.6 kg)   06/01/23 1338 (!) 450 lb (204.1 kg)       Exam  Gen: No acute distress, alert and oriented x3, no focal neurologic deficits, morbidly obese  Heent: NC AT, mucous memb clear, neck supple  Pulm: Lungs clear, normal respiratory effort  CV: Heart with regular rate and rhythm  Abd: Abdomen soft, nontender, nondistended, no organomegaly, bowel sounds present  MSK: Right knee with some swelling, extreme pain with movement, able to wiggle his toes bilaterally  skin: no rashes or lesions  Neuro: AO*3, motor intact, no sensory deficits  Psyc: appropriate mood and affect      Data Review:       Labs:     Recent Labs   Lab 02/11/24  0501 02/13/24  0521   RBC 4.69 4.42   HGB 14.7 13.6   HCT 44.0 41.4   MCV 93.8 93.7   MCH 31.3 30.8   MCHC 33.4 32.9   RDW 13.3 13.6   NEPRELIM  --  5.03   WBC 9.4 8.4   .0 177.0         Recent Labs   Lab  02/11/24  0501 02/13/24  0521   * 130*   BUN 15 16   CREATSERUM 0.86 0.78   EGFRCR 100 103   CA 8.8 8.4*    136   K 4.2 4.4    104   CO2 25.0 30.0       No results for input(s): \"ALT\", \"AST\", \"ALB\", \"AMYLASE\", \"LIPASE\", \"LDH\" in the last 168 hours.    Invalid input(s): \"ALPHOS\", \"TBIL\", \"DBIL\", \"TPROT\"      Imaging:  CT KNEE RIGHT (WIZ=33784)    Result Date: 2/11/2024  CONCLUSION:  Mildly comminuted and minimally displaced intra-articular fracture along the lateral tibial plateau.  Small lipohemarthrosis noted.    LOCATION:  [facility]   Dictated by (CST): Da Mitchell MD on 2/11/2024 at 3:28 PM     Finalized by (CST): Da Mitchell MD on 2/11/2024 at 3:30 PM       XR KNEE (1 OR 2 VIEWS), RIGHT (CPT=73560)    Result Date: 2/10/2024  CONCLUSION:   Elongated ossific density noted along the lateral tibial plateau without clear donor site.  This is favored to represent ligamentous ossification.  No definitive acute fracture or traumatic malalignment.  Joint spaces are preserved with tiny posterior patellar osteophytes.  No joint effusion or focal soft tissue swelling.   LOCATION:  [facility]   Dictated by (CST): Jaylyn Johnson MD on 2/10/2024 at 9:49 PM     Finalized by (CST): Jaylyn Johnson MD on 2/10/2024 at 9:51 PM       XR KNEE (1 OR 2 VIEWS), LEFT (CPT=73560)    Result Date: 2/10/2024  CONCLUSION:   No acute fracture or malalignment.  Normal mineralization.  Joint spaces are preserved.  No joint effusion or focal soft tissue swelling.   LOCATION:  [facility]   Dictated by (CST): Jaylyn Johnson MD on 2/10/2024 at 9:09 PM     Finalized by (CST): Jaylyn Johnson MD on 2/10/2024 at 9:09 PM          Meds:      dilTIAZem HCl ER Coated Beads  240 mg Oral Nightly    furosemide  20 mg Oral Daily    lisinopril  5 mg Oral Daily    metoprolol succinate ER  100 mg Oral 2x Daily(Beta Blocker)    rivaroxaban  20 mg Oral Daily with food    traZODone  200 mg Oral Nightly       oxyCODONE **OR** oxyCODONE, HYDROmorphone **OR**  HYDROmorphone **OR** HYDROmorphone, morphINE **OR** morphINE **OR** morphINE, melatonin, polyethylene glycol (PEG 3350), sennosides, bisacodyl, fleet enema, ondansetron, prochlorperazine, acetaminophen, traMADol

## 2024-02-14 ENCOUNTER — APPOINTMENT (OUTPATIENT)
Dept: MRI IMAGING | Facility: HOSPITAL | Age: 59
End: 2024-02-14
Attending: ORTHOPAEDIC SURGERY
Payer: MEDICARE

## 2024-02-14 LAB
ANION GAP SERPL CALC-SCNC: 4 MMOL/L (ref 0–18)
BASOPHILS # BLD AUTO: 0.04 X10(3) UL (ref 0–0.2)
BASOPHILS NFR BLD AUTO: 0.5 %
BUN BLD-MCNC: 16 MG/DL (ref 9–23)
CALCIUM BLD-MCNC: 8.5 MG/DL (ref 8.5–10.1)
CHLORIDE SERPL-SCNC: 108 MMOL/L (ref 98–112)
CO2 SERPL-SCNC: 27 MMOL/L (ref 21–32)
CREAT BLD-MCNC: 0.74 MG/DL
EGFRCR SERPLBLD CKD-EPI 2021: 104 ML/MIN/1.73M2 (ref 60–?)
EOSINOPHIL # BLD AUTO: 0.2 X10(3) UL (ref 0–0.7)
EOSINOPHIL NFR BLD AUTO: 2.7 %
ERYTHROCYTE [DISTWIDTH] IN BLOOD BY AUTOMATED COUNT: 13.6 %
GLUCOSE BLD-MCNC: 182 MG/DL (ref 70–99)
HCT VFR BLD AUTO: 39.1 %
HGB BLD-MCNC: 13.3 G/DL
IMM GRANULOCYTES # BLD AUTO: 0.05 X10(3) UL (ref 0–1)
IMM GRANULOCYTES NFR BLD: 0.7 %
LYMPHOCYTES # BLD AUTO: 1.37 X10(3) UL (ref 1–4)
LYMPHOCYTES NFR BLD AUTO: 18.3 %
MCH RBC QN AUTO: 31.4 PG (ref 26–34)
MCHC RBC AUTO-ENTMCNC: 34 G/DL (ref 31–37)
MCV RBC AUTO: 92.4 FL
MONOCYTES # BLD AUTO: 1.05 X10(3) UL (ref 0.1–1)
MONOCYTES NFR BLD AUTO: 14 %
NEUTROPHILS # BLD AUTO: 4.78 X10 (3) UL (ref 1.5–7.7)
NEUTROPHILS # BLD AUTO: 4.78 X10(3) UL (ref 1.5–7.7)
NEUTROPHILS NFR BLD AUTO: 63.8 %
OSMOLALITY SERPL CALC.SUM OF ELEC: 294 MOSM/KG (ref 275–295)
PLATELET # BLD AUTO: 170 10(3)UL (ref 150–450)
POTASSIUM SERPL-SCNC: 4.1 MMOL/L (ref 3.5–5.1)
RBC # BLD AUTO: 4.23 X10(6)UL
SODIUM SERPL-SCNC: 139 MMOL/L (ref 136–145)
WBC # BLD AUTO: 7.5 X10(3) UL (ref 4–11)

## 2024-02-14 PROCEDURE — 85025 COMPLETE CBC W/AUTO DIFF WBC: CPT | Performed by: STUDENT IN AN ORGANIZED HEALTH CARE EDUCATION/TRAINING PROGRAM

## 2024-02-14 PROCEDURE — 80048 BASIC METABOLIC PNL TOTAL CA: CPT | Performed by: STUDENT IN AN ORGANIZED HEALTH CARE EDUCATION/TRAINING PROGRAM

## 2024-02-14 NOTE — PROGRESS NOTES
[facility] Hospitalist Progress Note     CC: Hospital Follow up    PCP: Jenna Gutierrez MD       Assessment/Plan:     Principal Problem:    Contusion of knee, unspecified laterality, initial encounter  Active Problems:    Inability to ambulate due to knee        Deneen Eduard is a 58 year old male with PMH significant for CHF, depression/anxiety, HTN, DL, AYANNA, morbid obesity presents to the ED after a mechanical fall.  Patient slipped on now complaining of bilateral knee pain.     Mechanical fall w/ shannon knee pain  Pain management  -no fractures or dislocation on xray  -Increased pain management regimen to Oxy IR-discussed concerns for respiratory depression given his size and presence of AYANNA.  -supportive measure  -PT/OT assessment pending  -CT of right knee-mildly comminuted and minimally displaced intra-articular fracture along the lateral tibial plateau  - MRI of both knees ordered given significant pain however due to patient's weight.  Earlier discussions suggested that patient's weight would be prohibitive for MRI at [place].  Transfer process was initiated however now the weight was recalculated and was acceptable for the MRI.  Plan for MRI later today.  -Okay to utilize IV Dilaudid with p.o. regimen for pain control  -Orthopedic surgery following, case discussed with Dr. Kiley Walton A-fib  - Continue diltiazem, Xarelto    Morbid obesity  -has not tried any new meds or d/w PCP   -would encourage a strict change in diet and if possible/qualifies/affords new injectable wt loss meds such as ozempic/wegovy/mounjaro  - Recommend outpatient obesity clinic referral     HTN  -resume home meds     DL  -statin     AYANNA  -cpap      Depression/anxiety  -resume home meds        Quality:  DVT Prophylaxis: Xarelto  CODE status: full code  Medeiros: no  If COVID testing is negative, may discontinue isolation: yes      Plan of care discussed with patient and all questions answered.     DISPO  Pending clinical status         Marquis Del Toro DO  Hospitalist  [facility]       Subjective:     Seen and examined.  Plan for MRI today.  Continues to have pain in his right knee.  States that Dilaudid helps with his pain but oral medications do not as much.    OBJECTIVE:    Blood pressure (!) 142/91, pulse 76, temperature 97.3 °F (36.3 °C), temperature source Oral, resp. rate 20, height 5' 10\" (1.778 m), weight (!) 491 lb 10 oz (223 kg), SpO2 95%.    Temp:  [97.3 °F (36.3 °C)-98.1 °F (36.7 °C)] 97.3 °F (36.3 °C)  Pulse:  [76-97] 76  Resp:  [18-22] 20  BP: ()/(53-91) 142/91  SpO2:  [94 %-99 %] 95 %      Intake/Output:    Intake/Output Summary (Last 24 hours) at 2/14/2024 1400  Last data filed at 2/14/2024 1102  Gross per 24 hour   Intake --   Output 1500 ml   Net -1500 ml       Last 3 Weights   02/13/24 0341 (!) 491 lb 10 oz (223 kg)   02/12/24 1638 (!) 515 lb 14 oz (234 kg)   02/10/24 2258 (!) 570 lb (258.6 kg)   02/10/24 2010 (!) 570 lb (258.6 kg)   07/15/23 0156 (!) 470 lb (213.2 kg)   06/01/23 1934 (!) 448 lb 12.8 oz (203.6 kg)   06/01/23 1338 (!) 450 lb (204.1 kg)       Exam  Gen: No acute distress, alert and oriented x3, no focal neurologic deficits, morbidly obese  Heent: NC AT, mucous memb clear, neck supple  Pulm: Lungs clear, normal respiratory effort  CV: Heart with regular rate and rhythm  Abd: Abdomen soft, nontender, nondistended, no organomegaly, bowel sounds present  MSK: Right knee with some swelling, extreme pain with movement, able to wiggle his toes bilaterally  skin: no rashes or lesions  Neuro: AO*3, motor intact, no sensory deficits  Psyc: appropriate mood and affect      Data Review:       Labs:     Recent Labs   Lab 02/11/24  0501 02/13/24  0521 02/14/24  0552   RBC 4.69 4.42 4.23*   HGB 14.7 13.6 13.3   HCT 44.0 41.4 39.1   MCV 93.8 93.7 92.4   MCH 31.3 30.8 31.4   MCHC 33.4 32.9 34.0   RDW 13.3 13.6 13.6   NEPRELIM  --  5.03 4.78   WBC 9.4 8.4 7.5   .0 177.0 170.0         Recent Labs   Lab  02/11/24  0501 02/13/24  0521 02/14/24  0552   * 130* 182*   BUN 15 16 16   CREATSERUM 0.86 0.78 0.74   EGFRCR 100 103 104   CA 8.8 8.4* 8.5    136 139   K 4.2 4.4 4.1    104 108   CO2 25.0 30.0 27.0       No results for input(s): \"ALT\", \"AST\", \"ALB\", \"AMYLASE\", \"LIPASE\", \"LDH\" in the last 168 hours.    Invalid input(s): \"ALPHOS\", \"TBIL\", \"DBIL\", \"TPROT\"      Imaging:  CT KNEE RIGHT (MGS=[id number])    Result Date: 2/11/2024  CONCLUSION:  Mildly comminuted and minimally displaced intra-articular fracture along the lateral tibial plateau.  Small lipohemarthrosis noted.    LOCATION:  [facility]   Dictated by (CST): Da Mitchell MD on 2/11/2024 at 3:28 PM     Finalized by (CST): Da Mitchell MD on 2/11/2024 at 3:30 PM          Meds:      dilTIAZem HCl ER Coated Beads  240 mg Oral Nightly    furosemide  20 mg Oral Daily    lisinopril  5 mg Oral Daily    metoprolol succinate ER  100 mg Oral 2x Daily(Beta Blocker)    [Held by provider] rivaroxaban  20 mg Oral Daily with food    traZODone  200 mg Oral Nightly       oxyCODONE **OR** oxyCODONE, HYDROmorphone **OR** HYDROmorphone **OR** HYDROmorphone, morphINE **OR** morphINE **OR** morphINE, melatonin, polyethylene glycol (PEG 3350), sennosides, bisacodyl, fleet enema, ondansetron, prochlorperazine, acetaminophen, traMADol

## 2024-02-14 NOTE — PLAN OF CARE
Pt A&Ox4. VSS on RA. IV saline locked. NWB to BLE. Pain managed with prn pain meds, see MAR. Plan for MRI of bilateral knees to be done today. Confirmed with MRI that pt is on schedule, Bart lift attachment underneath pt for transferring. POC updated with pt. Call light within reach. Will continue to monitor.     Addendum: MRI unable to be completed d/t weight of pt.

## 2024-02-14 NOTE — PLAN OF CARE
Patient is alert and oriented x 4. Vitals stable on room air. Pain managed by prn meds. Denies numbness & tingling.Voiding without difficulty using bedpan. Total lift. Patient refusing knee immobilizers, ortho notified, no new orders. Non wt bearing to BLE. Tolerating regular diet. Plan for MRI of bilateral knee's and DOUG. Plan of care discussed with patient.

## 2024-02-15 LAB
ANION GAP SERPL CALC-SCNC: 3 MMOL/L (ref 0–18)
BASOPHILS # BLD AUTO: 0.05 X10(3) UL (ref 0–0.2)
BASOPHILS NFR BLD AUTO: 0.5 %
BUN BLD-MCNC: 13 MG/DL (ref 9–23)
CALCIUM BLD-MCNC: 8.7 MG/DL (ref 8.5–10.1)
CHLORIDE SERPL-SCNC: 106 MMOL/L (ref 98–112)
CO2 SERPL-SCNC: 28 MMOL/L (ref 21–32)
CREAT BLD-MCNC: 0.7 MG/DL
EGFRCR SERPLBLD CKD-EPI 2021: 106 ML/MIN/1.73M2 (ref 60–?)
EOSINOPHIL # BLD AUTO: 0.25 X10(3) UL (ref 0–0.7)
EOSINOPHIL NFR BLD AUTO: 2.4 %
ERYTHROCYTE [DISTWIDTH] IN BLOOD BY AUTOMATED COUNT: 13.4 %
GLUCOSE BLD-MCNC: 122 MG/DL (ref 70–99)
HCT VFR BLD AUTO: 40.1 %
HGB BLD-MCNC: 13.8 G/DL
IMM GRANULOCYTES # BLD AUTO: 0.08 X10(3) UL (ref 0–1)
IMM GRANULOCYTES NFR BLD: 0.8 %
LYMPHOCYTES # BLD AUTO: 1.84 X10(3) UL (ref 1–4)
LYMPHOCYTES NFR BLD AUTO: 17.3 %
MCH RBC QN AUTO: 31.5 PG (ref 26–34)
MCHC RBC AUTO-ENTMCNC: 34.4 G/DL (ref 31–37)
MCV RBC AUTO: 91.6 FL
MONOCYTES # BLD AUTO: 1.32 X10(3) UL (ref 0.1–1)
MONOCYTES NFR BLD AUTO: 12.4 %
NEUTROPHILS # BLD AUTO: 7.08 X10 (3) UL (ref 1.5–7.7)
NEUTROPHILS # BLD AUTO: 7.08 X10(3) UL (ref 1.5–7.7)
NEUTROPHILS NFR BLD AUTO: 66.6 %
OSMOLALITY SERPL CALC.SUM OF ELEC: 285 MOSM/KG (ref 275–295)
PLATELET # BLD AUTO: 194 10(3)UL (ref 150–450)
POTASSIUM SERPL-SCNC: 4 MMOL/L (ref 3.5–5.1)
RBC # BLD AUTO: 4.38 X10(6)UL
SODIUM SERPL-SCNC: 137 MMOL/L (ref 136–145)
WBC # BLD AUTO: 10.6 X10(3) UL (ref 4–11)

## 2024-02-15 PROCEDURE — 97530 THERAPEUTIC ACTIVITIES: CPT

## 2024-02-15 PROCEDURE — 97112 NEUROMUSCULAR REEDUCATION: CPT

## 2024-02-15 PROCEDURE — 80048 BASIC METABOLIC PNL TOTAL CA: CPT | Performed by: STUDENT IN AN ORGANIZED HEALTH CARE EDUCATION/TRAINING PROGRAM

## 2024-02-15 PROCEDURE — 97110 THERAPEUTIC EXERCISES: CPT

## 2024-02-15 PROCEDURE — 85025 COMPLETE CBC W/AUTO DIFF WBC: CPT | Performed by: STUDENT IN AN ORGANIZED HEALTH CARE EDUCATION/TRAINING PROGRAM

## 2024-02-15 RX ORDER — MORPHINE SULFATE 15 MG/1
15 TABLET, FILM COATED, EXTENDED RELEASE ORAL EVERY 12 HOURS SCHEDULED
Status: DISCONTINUED | OUTPATIENT
Start: 2024-02-15 | End: 2024-02-15

## 2024-02-15 RX ORDER — CYCLOBENZAPRINE HCL 10 MG
10 TABLET ORAL 3 TIMES DAILY PRN
Status: DISCONTINUED | OUTPATIENT
Start: 2024-02-15 | End: 2024-02-16

## 2024-02-15 NOTE — OCCUPATIONAL THERAPY NOTE
OCCUPATIONAL THERAPY TREATMENT NOTE - INPATIENT     Room Number: 365/365-A  Session: 2  Number of Visits to Meet Established Goals: 5    Presenting Problem: fall, B knee pain  Prior Level of Function: Patient reports independent in all I/ADL and functional mobility without device prior to admission. States was requiring increased time due to baseline decreased endurance/SOB, but managed everything independently.    ASSESSMENT   Patient demonstrates good  progress this session, goals remain in progress.    Patient continues to function below baseline with lower body dressing, bed mobility, transfers, stating sitting balance, dynamic sitting balance, and aerobic capacity.   Contributing factors to remaining limitations include decreased functional strength, pain, impaired sitting balance, decreased muscular endurance, medical status, decreased insight to deficits, and decreased safety awareness.  Next session anticipate patient to progress bed mobility and transfers.  Occupational Therapy will continue to follow patient for duration of hospitalization.    Patient continues to benefit from continued skilled OT services: to promote return to prior level of function and safety with continuous assistance and gradual rehabilitative therapy .            History: Patient is a 58 year old male admitted on 2/10/2024 with Presenting Problem: fall, B knee pain. B knee xrays negative for fracture. Patient reports no history of knee pain or buckling. Co-Morbidities : Morbid obesity, chronic afib, chronic diastolic CHF, HTN, HLD, DM2, AYANNA    Chart reviewed: per therapist's communication c Dr. Cao, pt is to be NWB BLE, KI on at all times except when working with therapy and is approved for AROM/PROM BLE and can sit EOB sans KI.      Per chart 2/15/2024 - Pt with new updated body weight of 491lb and 10oz.  Cleared for use of ceiling lift    WEIGHT BEARING RESTRICTION  Weight Bearing Restriction: None               TREATMENT  SESSION:  Patient Start of Session: semi supine in bed  FUNCTIONAL TRANSFER ASSESSMENT  Sit to Stand: Edge of Bed  Edge of Bed: Not Tested    BED MOBILITY  Rolling: Contact Guard Assist  Supine to Sit : Minimal Assist  Sit to Supine (OT): Not Tested    BALANCE ASSESSMENT  Static Sitting: Stand-by Assist  Sitting Unilateral: Stand-by Assist  Static Standing: Not Tested    FUNCTIONAL ADL ASSESSMENT       ACTIVITY TOLERANCE: WFL                         O2 SATURATIONS       EDUCATION PROVIDED  Patient: Role of Occupational Therapy; Plan of Care; Discharge Recommendations; Functional Transfer Techniques; Fall Prevention; Weight Bear Status; Posture/Positioning; Energy Conservation; Proper Body Mechanics  Patient's Response to Education: Verbalized Understanding; Returned Demonstration; Requires Further Education      Therapist comments: Discussed with pt for focus on tx session this date with focus on bed mobility sitting EOB and transferring to chair via total lift. Co-tx performed with PTA. Pt initially declining mobility stating he didn't have a good nights sleep and perseverating on complaints on lack of R knee mobility and needing to mentally prepare for each movement. Pt provided with extensive education on benefits of mobility and trialing use of lizbeth lift to get into the chair to further progress with skilled therapy needs. Pt with fair understanding but continues to delay EOB transfer to mentally prepare. Pt provided with R knee PROM flexion/extension to increase ROM/joint mobility and to decrease risk for further swelling to improve bed mobility and ADLs. Pt then performs bed mobility at min A x 2 to sit EOB with increased time and R LE support. Pt attempting to scoot further EOB with pt educated on type of bed he is in and the further he is to scoot, the higher risk it will be that he will slide off EOB. Pt with limited/poor understanding, but continued to reinforce maintaining where he is. 6-8 inch stoop  placed underneath pt's feet to increase stability/SARIKA. Pt with improved sitting balance and max education provided to maintain NWB to B LE in hopes for further progressing with UB ADLs/grooming tasks. Pt agreeable to trial sitting up in chair with transfer via ceiling lift. Pt returns to supine in bed at mod A x 1 with assist required for B LE onto bed and pt able to reposition up to HOB with use of headboard. Pt rolls side to side requiring CGA for total lift sling to be placed under pt. Pt transfers from bed to chair via lizbeth lift x 2 person assist. Pt educated on positioning/placement of B LE to adhere to NWB precautions. This writer educated nursing staff on transfer with use of ceiling lift and position in chair. Pt left with PCT/RN. Returned in later morning and assisted pt back into bed via ceiling lift.    Patient End of Session: Up in chair;With  staff;Needs met;Call light within reach;RN aware of session/findings;All patient questions and concerns addressed;Alarm set    SUBJECTIVE  \"I just didn't have a good night's sleep.\"  \"Please, just bear with me.\" - pt mentally preparing for bed mobility and transfer    PAIN ASSESSMENT  Ratin  Location: R knee  Management Techniques: Activity promotion;Body mechanics;Breathing techniques;Relaxation;Repositioning;Ice;Nurse notified     OBJECTIVE  Precautions: Bed/chair alarm    AM-PAC ‘6-Clicks’ Inpatient Daily Activity Short Form  -   Putting on and taking off regular lower body clothing?: Total  -   Bathing (including washing, rinsing, drying)?: A Lot  -   Toileting, which includes using toilet, bedpan or urinal? : Total  -   Putting on and taking off regular upper body clothing?: A Little  -   Taking care of personal grooming such as brushing teeth?: A Little  -   Eating meals?: None    AM-PAC Score:  Score: 14  Approx Degree of Impairment: 59.67%  Standardized Score (AM-PAC Scale): 33.39    PLAN  OT Treatment Plan: Balance activities;ADL training;Functional  transfer training;Endurance training;Patient/Family education;Patient/Family training;Compensatory technique education  Rehab Potential : Good  Frequency: 3x/week    OT Goals:     All goals ongoing 02/15    ADL Goals   Patient will perform lower body dressing:  with mod assist  Patient will perform toileting: with mod assist     Functional Transfer Goals  Patient will transfer from supine to sit:  with supervision  Patient will transfer from sit to stand:  with mod assist  Patient will tolerate sidesteps along edge of bed with min x2 for safety in preparation for commode transfer [progress as tolerated when no longer buckling]    OT Session Time: 90 minutes  Therapeutic Activity: 75 minutes

## 2024-02-15 NOTE — TRANSFER CENTER NOTE
Message left with 3 independent MRI centers to inquire on restrictions for the MRI.    Called: [facility], [facility], and [facility].     0930:  After speaking to each of the above centers, they cannot accommodate the pt either weight or width at their MRI locations. Updated Dr Del Toro.

## 2024-02-15 NOTE — PHYSICAL THERAPY NOTE
PHYSICAL THERAPY TREATMENT NOTE - INPATIENT    Room Number: 365/365-A     Session: 2     Number of Visits to Meet Established Goals: 5    Presenting Problem: Fall, bilateral knee pain  Co-Morbidities : Morbid obesity, chronic afib, chronic diastolic CHF, HTN, HLD, DM2, AYANNA    ASSESSMENT   Patient demonstrates good  progress this session, goals  remain in progress.    Patient continues to function below baseline with bed mobility, transfers, and gait.  Contributing factors to remaining limitations include decreased functional strength, pain, medical status, and NWB BLE  .  Next session anticipate patient to progress bed mobility and transfers.  Physical Therapy will continue to follow patient for duration of hospitalization.    Patient continues to benefit from continued skilled PT services: to promote return to prior level of function and safety with continuous assistance and gradual rehabilitative therapy .    PLAN  PT Treatment Plan: Bed mobility;Body mechanics;Endurance;Energy conservation;Family education;Patient education;Gait training;Balance training;Transfer training;Strengthening;Range of motion  Rehab Potential : Guarded  Frequency (Obs): 3x/week    Prior Level of Ozan: Pt typically indep with ADLs and mobility. Gets short of breath with increased activity.        CURRENT GOALS       Goal #1 Patient is able to demonstrate supine - sit EOB @ level: modified independent      Goal #2 Patient is able to demonstrate transfers EOB to/from Chair/Wheelchair at assistance level: moderate assistance          Goal Comments: Goals established on 2/11/2024    2/15/2024 all goals ongoing    SUBJECTIVE    \"Wow this went well\"    OBJECTIVE  Precautions: Bed/chair alarm    WEIGHT BEARING RESTRICTION  Weight Bearing Restriction: None                PAIN ASSESSMENT   Rating: Unable to rate  Location: R knee > L knee  Management Techniques: Breathing techniques;Repositioning    BALANCE                                                                                                                        Static Sitting: Fair -  Dynamic Sitting: Fair -           Static Standing: Not tested  Dynamic Standing: Not tested    ACTIVITY TOLERANCE                         O2 WALK         AM-PAC '6-Clicks' INPATIENT SHORT FORM - BASIC MOBILITY  How much difficulty does the patient currently have...  Patient Difficulty: Turning over in bed (including adjusting bedclothes, sheets and blankets)?: A Lot   Patient Difficulty: Sitting down on and standing up from a chair with arms (e.g., wheelchair, bedside commode, etc.): Unable   Patient Difficulty: Moving from lying on back to sitting on the side of the bed?: A Lot   How much help from another person does the patient currently need...   Help from Another: Moving to and from a bed to a chair (including a wheelchair)?: Total   Help from Another: Need to walk in hospital room?: Total   Help from Another: Climbing 3-5 steps with a railing?: Total       AM-PAC Score:  Raw Score: 8   Approx Degree of Impairment: 86.62%   Standardized Score (AM-PAC Scale): 28.58   CMS Modifier (G-Code): CM    FUNCTIONAL ABILITY STATUS  Gait Assessment   Functional Mobility/Gait Assessment  Gait Assistance: Not tested    Skilled Therapy Provided    Chart reviewed: per therapist's communication c Dr. Cao, pt is to  be NWB BLE, KI on at all times except when working with therapy and is approved for AROM/PROM BLE and can sit EOB sans KI.     Per chart 2/15/2024 - Pt with new updated body weight of 491lb and 10oz.  Cleared for use of ceiling lift    Bed Mobility:    Rolling: CGA    Supine<>Sit: Min A x 2  Sit<>Supine: Min A x 2    Therapist's Comments: writer brought in stoop step in order to safely sit on EOB - Pt required increased time for all mobility, and prefers to \"mentally prepare\" before each move.     Pt able to roll R/L to place lizbeth lift sling - Pt transferred up to chair - required two person assist to  stabilize patient in sling and elevate RLE on foot rest d/t body habitus.      Pt able to tolerate >30min in chair, then transferred back to bed.        THERAPEUTIC EXERCISES  Lower Extremity Ankle pumps  Glut sets  Hip AB/AD  Heel slides  Knee extension  Quad sets  SLR  AAROM     Upper Extremity OH Reaching and scooting up in bed c head board      Position Supine     Repetitions   10   Sets   1     Patient End of Session: In bed;Needs met;Call light within reach;RN aware of session/findings;All patient questions and concerns addressed;Alarm set    PT Session Time: 90 minutes  Therapeutic Activity: 45 minutes  Therapeutic Exercise: 10 minutes   Neuromuscular Re-education: 15 minutes

## 2024-02-15 NOTE — CM/SW NOTE
02/15/24 1157   Choice of Post-Acute Provider   Informed patient of right to choose their preferred provider Yes   List of appropriate post-acute services provided to patient/family with quality data Yes   Patient/family choice [facility]   Information given to Patient       Met with pt to discuss DC planning.  Pt normally lives alone in an apartment and is indepednent with ADLs.  Currently pt is NWB BL LE and recommended MRI cannot be completed due to pt's weight and body habitus.      Provided pt with [facility] information for [facility] and discussed option for shelter admission under pt's Medicaid insurance.  Pt willing to consider this, but does prefer to discharge to home if possible.  He said that his family has indicated they would pay for private duty caregivers at home.  Pt has been in contact with [facility] and would like to see if they can provide caregiver services and assistance such that he can discharge to home.  Pt plans to order bedside commode and would plan to stand on his LLE to pivot transfer to commode.  Per ortho, pt is recommended to be NWB to LLE.  Pt confirmed he does understand this, but feels he can stand on this leg and would plan to do so at home.      [facility] reserved in [facility].  They have bed available and can accept pt for admission today.  Pt to continue to work with [facility] for possible option for caregivers and home DC plan.  Updated RN.  DC plan pending medical clearance for discharge.  / to remain available for support and/or discharge planning.     Ailin Hannon, [facility]  Discharge Planner  [phone]

## 2024-02-15 NOTE — PLAN OF CARE
Patient A&Ox4, VSS on RA, , tele; Afib. Patient reports severe pain to bilateral knees; PO muscle relaxer given. Right knee immobilizer on as tolerated when sitting at edge of bed. Refused left knee immobilizer. NWB to BLE. Patient voiding freely, LBM 2/14. Spoke with MRI this morning who states exams unable to be completed going further. MD made aware. Plan of care TBD. Patient made aware.

## 2024-02-15 NOTE — PLAN OF CARE
Aox4, reporting severe pain that is managed by ordered pain medication, ice packs applied to bilateral knees, on room air , AYANNA no CPAP, Xarelto on hold, scds refused, ankle pumps encouraged, voiding via primofit or bed pan, NWB to BLE, KI to BLE, unable to complete MRI today, plan TBD

## 2024-02-15 NOTE — PROGRESS NOTES
[facility] Hospitalist Progress Note     CC: Hospital Follow up    PCP: Jenna Gutierrez MD       Assessment/Plan:     Principal Problem:    Contusion of knee, unspecified laterality, initial encounter  Active Problems:    Inability to ambulate due to knee        Deneen Eduard is a 58 year old male with PMH significant for CHF, depression/anxiety, HTN, DL, AYANNA, morbid obesity presents to the ED after a mechanical fall.  Patient slipped on now complaining of bilateral knee pain.     Mechanical fall w/ shannon knee pain  Pain management  -no fractures or dislocation on xray  -Increased pain management regimen to Oxy IR-discussed concerns for respiratory depression given his size and presence of AYANNA.  -supportive measure  -PT/OT assessment pending  -CT of right knee-mildly comminuted and minimally displaced intra-articular fracture along the lateral tibial plateau  - MRI of both knees ordered given significant pain however due to patient's weight.  Earlier discussions suggested that patient's weight would be prohibitive for MRI at [facility].  Transfer process was initiated however now the weight was recalculated and was acceptable for the MRI.  Unfortunately, patient was not able to get the MRI due to size restrictions.  - Discussed case with transfer center again, unable to transfer patient elsewhere, limited by weight for external organizations as well at this time.  - Discussed case again with -will plan for LUKASZ to further assess compromise to any vasculature at this time  - Try to limit IV Dilaudid given goal of rehab in the next 24 to 48 hours.     Paroxysmal A-fib  - Continue diltiazem, Xarelto-holding    Morbid obesity  -has not tried any new meds or d/w PCP   -would encourage a strict change in diet and if possible/qualifies/affords new injectable wt loss meds such as ozempic/wegovy/mounjaro  - Recommend outpatient obesity clinic referral     HTN  -resume home meds     DL  -statin     AYANNA  -cpap       Depression/anxiety  -resume home meds        Quality:  DVT Prophylaxis: Xarelto  CODE status: full code  Medeiros: no  If COVID testing is negative, may discontinue isolation: yes      Plan of care discussed with patient and all questions answered.     DISPO  Pending clinical status        Marquis Del Toro DO  Hospitalist  [facility]       Subjective:     Seen and examined.  Complains of pain, IV medications helps with pain.  Unable to get MRI yesterday.  Patient is very motivated to participate in rehab.  He was able to get up in the chair today with assistance.  Per RN, he did well with PT..    OBJECTIVE:    Blood pressure 112/57, pulse 91, temperature 97.7 °F (36.5 °C), temperature source Axillary, resp. rate 19, height 5' 10\" (1.778 m), weight (!) 491 lb 10 oz (223 kg), SpO2 97%.    Temp:  [97.7 °F (36.5 °C)-98.1 °F (36.7 °C)] 97.7 °F (36.5 °C)  Pulse:  [] 91  Resp:  [18-19] 19  BP: (112-158)/(57-90) 112/57  SpO2:  [95 %-97 %] 97 %      Intake/Output:    Intake/Output Summary (Last 24 hours) at 2/15/2024 1657  Last data filed at 2/15/2024 1251  Gross per 24 hour   Intake --   Output 1750 ml   Net -1750 ml       Last 3 Weights   02/13/24 0341 (!) 491 lb 10 oz (223 kg)   02/12/24 1638 (!) 515 lb 14 oz (234 kg)   02/10/24 2258 (!) 570 lb (258.6 kg)   02/10/24 2010 (!) 570 lb (258.6 kg)   07/15/23 0156 (!) 470 lb (213.2 kg)   06/01/23 1934 (!) 448 lb 12.8 oz (203.6 kg)   06/01/23 1338 (!) 450 lb (204.1 kg)       Exam  Gen: No acute distress, alert and oriented x3, no focal neurologic deficits, morbidly obese  Heent: NC AT, mucous memb clear, neck supple  Pulm: Lungs clear, normal respiratory effort  CV: Heart with regular rate and rhythm  Abd: Abdomen soft, nontender, nondistended, no organomegaly, bowel sounds present  MSK: Right knee with some swelling improved., extreme pain with movement, able to wiggle his toes bilaterally  skin: no rashes or lesions  Neuro: AO*3, motor intact, no sensory  deficits  Psyc: appropriate mood and affect      Data Review:       Labs:     Recent Labs   Lab 02/13/24  0521 02/14/24  0552 02/15/24  0528   RBC 4.42 4.23* 4.38   HGB 13.6 13.3 13.8   HCT 41.4 39.1 40.1   MCV 93.7 92.4 91.6   MCH 30.8 31.4 31.5   MCHC 32.9 34.0 34.4   RDW 13.6 13.6 13.4   NEPRELIM 5.03 4.78 7.08   WBC 8.4 7.5 10.6   .0 170.0 194.0         Recent Labs   Lab 02/13/24  0521 02/14/24  0552 02/15/24  0528   * 182* 122*   BUN 16 16 13   CREATSERUM 0.78 0.74 0.70   EGFRCR 103 104 106   CA 8.4* 8.5 8.7    139 137   K 4.4 4.1 4.0    108 106   CO2 30.0 27.0 28.0       No results for input(s): \"ALT\", \"AST\", \"ALB\", \"AMYLASE\", \"LIPASE\", \"LDH\" in the last 168 hours.    Invalid input(s): \"ALPHOS\", \"TBIL\", \"DBIL\", \"TPROT\"      Imaging:  No results found.      Meds:      dilTIAZem HCl ER Coated Beads  240 mg Oral Nightly    furosemide  20 mg Oral Daily    lisinopril  5 mg Oral Daily    metoprolol succinate ER  100 mg Oral 2x Daily(Beta Blocker)    [Held by provider] rivaroxaban  20 mg Oral Daily with food    traZODone  200 mg Oral Nightly       cyclobenzaprine, [DISCONTINUED] oxyCODONE **OR** oxyCODONE, HYDROmorphone **OR** HYDROmorphone **OR** HYDROmorphone, melatonin, polyethylene glycol (PEG 3350), sennosides, bisacodyl, fleet enema, ondansetron, prochlorperazine, acetaminophen, traMADol

## 2024-02-15 NOTE — CM/SW NOTE
Notified surgeon of inability for MRI to be performed at [facility] yesterday. Informed of attempts made by transfer center and CM/SW to find tertiary centers/outpatient MRI depts to accommodate pt in MRI. Recommendation for this to be followed up in the community - pt planning to discharge to SNF under Medicaid to assist with ADLs, as he is NWB to both extremities.     Updated care team. Hospitalist with plans to further discuss with ortho.    JESUS ALBERTO RuelasN, RN-BC    [phone]

## 2024-02-16 ENCOUNTER — APPOINTMENT (OUTPATIENT)
Dept: ULTRASOUND IMAGING | Facility: HOSPITAL | Age: 59
End: 2024-02-16
Attending: STUDENT IN AN ORGANIZED HEALTH CARE EDUCATION/TRAINING PROGRAM
Payer: MEDICARE

## 2024-02-16 VITALS
HEART RATE: 88 BPM | HEIGHT: 70 IN | OXYGEN SATURATION: 97 % | BODY MASS INDEX: 45.1 KG/M2 | RESPIRATION RATE: 18 BRPM | WEIGHT: 315 LBS | DIASTOLIC BLOOD PRESSURE: 63 MMHG | TEMPERATURE: 98 F | SYSTOLIC BLOOD PRESSURE: 118 MMHG

## 2024-02-16 LAB
ANION GAP SERPL CALC-SCNC: 7 MMOL/L (ref 0–18)
ATRIAL RATE: 110 BPM
ATRIAL RATE: 227 BPM
BASOPHILS # BLD AUTO: 0.05 X10(3) UL (ref 0–0.2)
BASOPHILS NFR BLD AUTO: 0.6 %
BUN BLD-MCNC: 15 MG/DL (ref 9–23)
CALCIUM BLD-MCNC: 8.5 MG/DL (ref 8.5–10.1)
CHLORIDE SERPL-SCNC: 104 MMOL/L (ref 98–112)
CO2 SERPL-SCNC: 27 MMOL/L (ref 21–32)
CREAT BLD-MCNC: 0.76 MG/DL
EGFRCR SERPLBLD CKD-EPI 2021: 104 ML/MIN/1.73M2 (ref 60–?)
EOSINOPHIL # BLD AUTO: 0.25 X10(3) UL (ref 0–0.7)
EOSINOPHIL NFR BLD AUTO: 2.9 %
ERYTHROCYTE [DISTWIDTH] IN BLOOD BY AUTOMATED COUNT: 13.4 %
GLUCOSE BLD-MCNC: 129 MG/DL (ref 70–99)
HCT VFR BLD AUTO: 41.1 %
HGB BLD-MCNC: 14 G/DL
IMM GRANULOCYTES # BLD AUTO: 0.1 X10(3) UL (ref 0–1)
IMM GRANULOCYTES NFR BLD: 1.2 %
LYMPHOCYTES # BLD AUTO: 1.87 X10(3) UL (ref 1–4)
LYMPHOCYTES NFR BLD AUTO: 21.7 %
MCH RBC QN AUTO: 31.5 PG (ref 26–34)
MCHC RBC AUTO-ENTMCNC: 34.1 G/DL (ref 31–37)
MCV RBC AUTO: 92.4 FL
MONOCYTES # BLD AUTO: 1.22 X10(3) UL (ref 0.1–1)
MONOCYTES NFR BLD AUTO: 14.2 %
NEUTROPHILS # BLD AUTO: 5.13 X10 (3) UL (ref 1.5–7.7)
NEUTROPHILS # BLD AUTO: 5.13 X10(3) UL (ref 1.5–7.7)
NEUTROPHILS NFR BLD AUTO: 59.4 %
OSMOLALITY SERPL CALC.SUM OF ELEC: 289 MOSM/KG (ref 275–295)
PLATELET # BLD AUTO: 201 10(3)UL (ref 150–450)
POTASSIUM SERPL-SCNC: 4.2 MMOL/L (ref 3.5–5.1)
Q-T INTERVAL: 376 MS
Q-T INTERVAL: 384 MS
QRS DURATION: 102 MS
QRS DURATION: 98 MS
QTC CALCULATION (BEZET): 452 MS
QTC CALCULATION (BEZET): 459 MS
R AXIS: -44 DEGREES
R AXIS: -54 DEGREES
RBC # BLD AUTO: 4.45 X10(6)UL
SODIUM SERPL-SCNC: 138 MMOL/L (ref 136–145)
T AXIS: -15 DEGREES
T AXIS: -6 DEGREES
VENTRICULAR RATE: 86 BPM
VENTRICULAR RATE: 87 BPM
WBC # BLD AUTO: 8.6 X10(3) UL (ref 4–11)

## 2024-02-16 PROCEDURE — 93010 ELECTROCARDIOGRAM REPORT: CPT | Performed by: INTERNAL MEDICINE

## 2024-02-16 PROCEDURE — 93005 ELECTROCARDIOGRAM TRACING: CPT

## 2024-02-16 PROCEDURE — 80048 BASIC METABOLIC PNL TOTAL CA: CPT | Performed by: STUDENT IN AN ORGANIZED HEALTH CARE EDUCATION/TRAINING PROGRAM

## 2024-02-16 PROCEDURE — 93922 UPR/L XTREMITY ART 2 LEVELS: CPT | Performed by: STUDENT IN AN ORGANIZED HEALTH CARE EDUCATION/TRAINING PROGRAM

## 2024-02-16 PROCEDURE — 85025 COMPLETE CBC W/AUTO DIFF WBC: CPT | Performed by: STUDENT IN AN ORGANIZED HEALTH CARE EDUCATION/TRAINING PROGRAM

## 2024-02-16 RX ORDER — POLYETHYLENE GLYCOL 3350 17 G/17G
17 POWDER, FOR SOLUTION ORAL DAILY PRN
Status: ON HOLD | COMMUNITY
Start: 2024-02-16

## 2024-02-16 RX ORDER — ESCITALOPRAM OXALATE 20 MG/1
20 TABLET ORAL DAILY
COMMUNITY
End: 2024-03-11

## 2024-02-16 RX ORDER — ATOGEPANT 60 MG/1
60 TABLET ORAL DAILY
COMMUNITY
End: 2024-03-11

## 2024-02-16 RX ORDER — TOPIRAMATE 50 MG/1
100 TABLET, FILM COATED ORAL 2 TIMES DAILY
COMMUNITY
End: 2024-03-11

## 2024-02-16 RX ORDER — CYCLOBENZAPRINE HCL 10 MG
10 TABLET ORAL 3 TIMES DAILY PRN
Qty: 15 TABLET | Refills: 0 | Status: ON HOLD | OUTPATIENT
Start: 2024-02-16

## 2024-02-16 RX ORDER — ELETRIPTAN HYDROBROMIDE 40 MG/1
40 TABLET, FILM COATED ORAL AS NEEDED
COMMUNITY
End: 2024-03-11

## 2024-02-16 RX ORDER — OXYCODONE HYDROCHLORIDE 10 MG/1
10 TABLET ORAL EVERY 4 HOURS PRN
Qty: 20 TABLET | Refills: 0 | Status: SHIPPED | OUTPATIENT
Start: 2024-02-16 | End: 2024-03-11

## 2024-02-16 RX ORDER — HYDROXYZINE HYDROCHLORIDE 10 MG/1
5 TABLET, FILM COATED ORAL DAILY PRN
COMMUNITY
End: 2024-03-11

## 2024-02-16 RX ORDER — DIVALPROEX SODIUM 500 MG/1
500 TABLET, EXTENDED RELEASE ORAL DAILY
COMMUNITY
End: 2024-03-11

## 2024-02-16 RX ORDER — NALOXONE HYDROCHLORIDE 4 MG/.1ML
4 SPRAY NASAL AS NEEDED
Qty: 1 KIT | Refills: 0 | Status: ON HOLD | OUTPATIENT
Start: 2024-02-16

## 2024-02-16 RX ORDER — NIACIN 1000 MG/1
1000 TABLET, EXTENDED RELEASE ORAL 2 TIMES DAILY
COMMUNITY
End: 2024-03-11

## 2024-02-16 RX ORDER — TRAMADOL HYDROCHLORIDE 50 MG/1
100 TABLET ORAL EVERY 8 HOURS PRN
Qty: 20 TABLET | Refills: 0 | Status: ON HOLD | OUTPATIENT
Start: 2024-02-16

## 2024-02-16 RX ORDER — METOPROLOL SUCCINATE 100 MG/1
100 TABLET, EXTENDED RELEASE ORAL 2 TIMES DAILY
Status: ON HOLD | COMMUNITY

## 2024-02-16 NOTE — PROGRESS NOTES
Spoke with Dr. Cao regarding US results. Patient to be NWB to right lower extremity with knee immobilizer on and can be WBAT to left lower extremity with knee immobilizer on. Patient cleared to DC by his service.

## 2024-02-16 NOTE — CM/SW NOTE
Spoke with Anjelica from [facility] who met with pt today.  She stated that they are not able to manage pt's needs at home at this time.  She stated she would follow pt's case for possible home services if his mobility improves.     Anticipate DC to [facility] facility at [place].  Await medical clearance for discharge.    [facility]  [phone]    Ailin Hannon, [facility]  Discharge Planner  [phone]

## 2024-02-16 NOTE — CM/SW NOTE
Informed by RN that pt can discharge today.  Pt interested in transfer to [facility].    Spoke with [facility] who stated that due to lateness of the day, weekend and Monday holiday, pt would not be considered for admission to their rehab facility until Tuesday.  Contact for their rehab admission coordinator is Edna [phone].  Offered to fax clinicals and she declined stating they should be sent once Edna contacted on Tuesday.    Spoke with Genoveva from admissions at [facility].  She confirmed pt can be accepted for admission today.  They will do on site PT/OT evals and submit for insurance auth if appropriate.  Informed her that pt also requesting transfer to [facility] and she confirmed they can assist with that as well.  Pt will go to room 2407.  RN to call report to [phone].    Met with pt to discuss DC planning as above.  Pt agreeable with DC to [facility] today.  [facility].  He would also like to look into [facility] contracted facilities.  Contact info for Edna given.  All questions/concerns addressed.    Ambulance transport scheduled for 5pm.  PCS form completed and available for RN to print.  Updated RN/MD.  / to remain available for support and/or discharge planning.     [facility], room 2407  [phone] for RN report    [facility]  [phone]    Ailin Hannon [facility]  Discharge Planner  [phone]

## 2024-02-16 NOTE — TRANSFER CENTER NOTE
The [facility] the [facility]. Provided pt information and the pt is registered in the system. She will get back to us regarding the transfer.

## 2024-02-16 NOTE — CM/SW NOTE
Spoke with REYES in the MRI dept #[phone] at [facility] and he cannot accommodate pt in MRI. Informed [facility] pt had previously had a MRI there, however he states \"it is not going to happen.\" REYES relays that most hospitals/facilities have the same size MRI machines and even if they can accommodate a certain weight, it doesn't mean they can accommodate pt's abdominal girth.     Shared with care team.    1330: Perfect Serve sent to ortho and hospitalist to discuss POC. Ultrasound completed.    Mattie Cedeno, JESUS ALBERTON, RN-BC    [phone]

## 2024-02-16 NOTE — DIETARY NOTE
[facility]   CLINICAL NUTRITION    Deneen Chapa     Admitting diagnosis:  Inability to ambulate due to knee [R26.2]  Contusion of knee, unspecified laterality, initial encounter [S80.00XA]    Ht: 177.8 cm (5' 10\")  Wt: (!) 223 kg (491 lb 10 oz).   Body mass index is 70.54 kg/m².  IBW: 75.5 kg    Wt Readings from Last 6 Encounters:   02/13/24 (!) 223 kg (491 lb 10 oz)   07/15/23 (!) 213.2 kg (470 lb)   06/01/23 (!) 203.6 kg (448 lb 12.8 oz)   04/09/23 (!) 206.2 kg (454 lb 9.4 oz)   12/01/22 (!) 203.7 kg (449 lb)   03/28/22 (!) 196 kg (432 lb)        Labs/Meds reviewed  -Glu:129  -Lasix, Dilaudid, Oxycodone    Diet:       Procedures    Regular/General diet Is Patient on Accuchecks? No     Percent Meals Eaten (last 3 days)       Date/Time Percent Meals Eaten (%)    02/14/24 2000 100 %    02/15/24 1500 100 %    02/16/24 0900 100 %          Pt chart reviewed d/t length of stay. Pt currently off unit for ultrasound at time of visit. Left handout at bedside on General, Healthful Nutrition Therapy with list of foods recommended, foods to avoid/limit, sample menus, and Tips for Weight Loss.     Pt appears to be eating well this admit. Recorded PO intakes:100% all meals.    No significant weight changes noted per EMR hx. Wt appears to be trending up.    Plans for discharge today.    PMH:morbid obesity, HTN, DL, CHF, AYANNA, hx of ETOH abuse (last drink in Jan 2020).    Patient is at low nutrition risk at this time.    Please consult if patient status changes or nutrition issues arise.    Beverley Kramer MS, RD, LDN  Clinical Dietitian  Ext:[phone]

## 2024-02-16 NOTE — PROGRESS NOTES
NURSING DISCHARGE NOTE    Discharged Home via Ambulance.  Accompanied by Support staff  Belongings Taken by patient/family.    Patient transferred to [facility] via ceiling lift with EMS and nursing assistance. Patient tolerated well. Hospital sling removed after transfer. Patient discharge to nursing facility with belongings.    Report given to RN at nursing facility. Name and number left for questions and concerns

## 2024-02-16 NOTE — PLAN OF CARE
Patient A&Ox4, VSS on RA, , tele; Afib. Patient denies need for pain medications. Knee immobilizers on BLE. NWB to BLE. Patient voiding freely, LBM 2/15. Spoke with US this morning regarding pending US LUKASZ. Unable to be given an estimated time of imaging at this time. Informed SW that patient states [facility] to [facility]. Safety measures in place.

## 2024-02-16 NOTE — CM/SW NOTE
Received call from Anjelica with [facility] who is planning to see pt today at bedside for eval for caregiver support/services at home.  Reviewed pt's clinical needs and mobility restrictions.  Faxed clinical packet: [phone].  Await their decision regarding ability to support pt's needs for home discharge plan.     Updated [facility] regarding pending medical clearance for DC.  They can accept pt under Medicaid for detention care at DC.  / to remain available for support and/or discharge planning.     Ailin Hannon, [facility]  Discharge Planner  [phone]

## 2024-02-16 NOTE — PLAN OF CARE
AxO4. VSS - 2L PRN. AYANNA - CPAP @ home. On tele-afib. Xarelto on hold. C/o pain to bilateral knees - PO muscle relaxant, oxy given. Voids w/o issue, denies nausea. NWB to BLE. R knee immobilizer when sitting up - refusing L knee immobilizer. To have BLE US study tomorrow. MRI unable to accommodate patient - plan TBD. DC w/ caregiver vs DOUG. Updated on POC. Safety measures placed. Care ongoing.

## 2024-02-16 NOTE — PROGRESS NOTES
[facility]    Progress Note    Deneenhank Chapa Patient Status:  Observation    1965 MRN [id number]   Location [facility] 3SW-A Attending Ariadna Coburn MD   Hosp Day # 0 PCP Jenna Gutierrez MD     SUBJECTIVE:  Interval History: Patient has no current complaints.  Pain: controlled at rest.  Patient denies chest pain, shortness of breath and calf pain. Irritation with KI use. Has been unable to obtain MRI due to habitus.    OBJECTIVE:  Blood pressure 104/64, pulse 64, temperature 98.9 °F (37.2 °C), temperature source Oral, resp. rate 18, height 5' 10\" (1.778 m), weight (!) 491 lb 10 oz (223 kg), SpO2 (!) 77%.     General: Alert, orientated x3.  Cooperative.  No apparent distress.  Vital Signs:  Blood pressure 106/69, pulse 77, temperature 97.8 °F (36.6 °C), temperature source Oral, resp. rate 18, height 5' 10\" (1.778 m), weight (!) 570 lb (258.6 kg), SpO2 97%.  BLE:  No gross deformity, skin intact.  There is swelling present across the right knee as well as a 2+ right knee effusion.  He has limitations of bilateral knee range of motion and is unable to tolerate flexion past 30 degrees.  On the left knee he has tenderness along the MCL tract with 2+ widening and pain to valgus stress.  On the right knee has tenderness laterally along the tibial plateau eminence and has 2+ instability to varus stress.  Difficult to assess Lachman's exam on bilateral knees due to discomfort but concern for laxity on the right lower extremity.  He has sensation intact distally as well as appropriate firing of motor myotomes distally.  Difficult to assess DP and PT pulses due to habitus but appropriate appearance and capillary refill to lower extremities bilaterally.    Data Review:  Radiographs of the left knee demonstrates no acute fracture, osseous abnormality, or dislocation.     2 views of the right knee demonstrate abnormality the lateral plateau concerning for possible anterior lateral capsular avulsion (Tena  fracture) consistent with possible anterior cruciate ligament tear.  No discrete fracture of the plateau visualized.    CT of the right knee dated 2/11/2024 demonstrates nondisplaced fracture of the lateral tibial plateau.  There is no significant joint space depression or evidence of joint space widening.  Fracture fragment predominantly involves the metaphyseal flare without significant intra-articular step-off or involvement.    ASSESSMENT/PLAN:  58-year-old male with morbid obesity and several other comorbidities who suffered a fall from standing yesterday with significant bilateral lower extremity pain through the knees particular the right lower extremity.     -Had a long discussion with the patient today that at his elevated BMI with his injury mechanism I have significant concern for ultralow velocity knee dislocation, particularly to the right knee.  We discussed that the left knee is likely more consistent with an isolated MCL sprain.  I would recommend bilateral knee MRI to better evaluate for ligamentous injury/injury burden  - Unable to accommodate MRI of bilateral knee given patient habitus  - Discussed that most significant risk is that of vascular injury, recommend bilateral LUKASZ and if LE LUKASZ is less than 0.9 would recommend follow-up CTA  - CT demonstrates nondisplaced fracture to the lateral tibial plateau  -Nonweightbearing bilateral lower extremity  -Knee immobilizer bilateral lower extremity  -Cleared to continue ankle pumps as well as work on active and passive knee range of motion as he is able though would do so in a nonweightbearing manner    Sumeet Cao MD  2/15/2024  4:45 PM

## 2024-02-19 ENCOUNTER — INITIAL APN SNF VISIT (OUTPATIENT)
Dept: INTERNAL MEDICINE CLINIC | Facility: SKILLED NURSING FACILITY | Age: 59
End: 2024-02-19

## 2024-02-19 DIAGNOSIS — I50.9 CHRONIC CONGESTIVE HEART FAILURE, UNSPECIFIED HEART FAILURE TYPE (HCC): ICD-10-CM

## 2024-02-19 DIAGNOSIS — G47.33 OSA (OBSTRUCTIVE SLEEP APNEA): ICD-10-CM

## 2024-02-19 DIAGNOSIS — S82.141D CLOSED FRACTURE OF RIGHT TIBIAL PLATEAU WITH ROUTINE HEALING, SUBSEQUENT ENCOUNTER: ICD-10-CM

## 2024-02-19 DIAGNOSIS — I48.91 ATRIAL FIBRILLATION, UNSPECIFIED TYPE (HCC): ICD-10-CM

## 2024-02-19 DIAGNOSIS — I10 PRIMARY HYPERTENSION: ICD-10-CM

## 2024-02-19 DIAGNOSIS — M25.562 ACUTE PAIN OF BOTH KNEES: Primary | ICD-10-CM

## 2024-02-19 DIAGNOSIS — E66.01 MORBID OBESITY (HCC): ICD-10-CM

## 2024-02-19 DIAGNOSIS — Z79.899 ENCOUNTER FOR MEDICATION REVIEW: ICD-10-CM

## 2024-02-19 DIAGNOSIS — M25.561 ACUTE PAIN OF BOTH KNEES: Primary | ICD-10-CM

## 2024-02-19 PROCEDURE — 99306 1ST NF CARE HIGH MDM 50: CPT | Performed by: CLINICAL NURSE SPECIALIST

## 2024-02-19 PROCEDURE — 1124F ACP DISCUSS-NO DSCNMKR DOCD: CPT | Performed by: CLINICAL NURSE SPECIALIST

## 2024-02-19 NOTE — PROGRESS NOTES
HPI: Deneen Chapa  : 1965  Age 58 year old  male patient is admitted to [facility] for DOUG    Reason for visit: Initial APRN assessment and f/u Bilateral knee pain, s/p fall, R nondisplaced fx of tibial plateau, morbid obesity,A.fib, HTN, CHF    [facility]: 1/10-  [facility]: -present     This is a 57 yo male w/ past medical hx significant for HTN, depression/anxiety, DL, AYANNA, morbid obesity, CHF; who presented to [facility] on 2/10 after mechanical fall at home resulting in B knee pain.  Pt was admitted for further management.  XR was negative.  CT w/ Right nondisplaced lateral tibial plateau fx. Pt was seen by rtho, Dr. Cao, w/ recs for MRI for further ligament injury due to instability of the knee.  Pt unable to undergo MRI due to body habitus.  Had LUKASZ to evaluate for vascular injury. Recs for B knee immobilizer and NWB to BLE.  Pt cleared for passive exercises.  Due to immobility DOUG was recommended.  [facility] but this was unable to be arranged in short period of time.  Pt elected to be transferred to [facility] at this time.      Pt seen in bed, in no acute distress.  Sts he is doing well but his knees are still painful.  Noted w/o knee immobilizers.  Pt sts he was told at the hospital that he does not need to wear knee immobilizers while in bed.  Sts he is working w/ PT and exercising more.  Discussed w/ pt NWB restrictions to BLE but pt sts he was told he can put weight on LLE.  Discussed w/ nurse to call ortho and clarify knee immobilizer and WB restrictions.  Pt sts he is still hopeful for transfer to a [facility].  Sts he knows that at [facility] he would be able to get MRI as he had one there before.  SW is assisting w/ transfer referrals.  Discussed w/ pt h/o AYANNA.  Pt sts he has CPAP at home but is not fully compliant and sts that he is still getting used to it.  Discussed w/ pt to bring his CPAP to use at [facility].  Pt somewhat  reluctant but after discussion, sts he will ask a friend to bring it over.      Past Medical History:   Diagnosis Date    Alcohol abuse     last drink Jan 2020    Anxiety     [facility]: [facility]. Denny    Arrhythmia     Atrial fibrillation (HCC)     EP cards: Dr. Sumeet Colon, PAroxysmal AFIB    Calculus of kidney     Congestive heart failure (CHF) (HCC)     Depression     [facility] psych: Dr. Denny--currently on wellbutrin as of 3/28/22, tried zoloft and didn't help    High blood pressure     Hyperlipidemia     Morbid obesity (HCC)     Obstructive sleep apnea     Prediabetes     Sleep apnea     Vertigo     Dr. Kallie Acevedo----Mirtazepine     Past Surgical History:   Procedure Laterality Date    APPENDECTOMY      COLONOSCOPY      JAW SURGERY      OTHER SURGICAL HISTORY  02/2018, 2020    cardioversion, Dr. Sumeet Colon---2020    OTHER SURGICAL HISTORY  05/07/2019    Cysto-Dr. Timmons     Family History   Problem Relation Age of Onset    Aldara Father     No Known Problems Mother      Social History     Socioeconomic History    Marital status: Single   Tobacco Use    Smoking status: Never    Smokeless tobacco: Never   Vaping Use    Vaping Use: Never used   Substance and Sexual Activity    Alcohol use: No     Comment: stopped drinking 2/2018    Drug use: No   Social History Narrative    -disability--for AFIB    -single, no children    -lives alone    -no tobacco, no ETOH, no cannabis, no illicits    -no ETOh since Jan 2020     Social Determinants of Health     Food Insecurity: No Food Insecurity (2/10/2024)    Food Insecurity     Food Insecurity: Never true   Transportation Needs: No Transportation Needs (2/10/2024)    Transportation Needs     Lack of Transportation: No   Housing Stability: Low Risk  (2/10/2024)    Housing Stability     Housing Instability: No       ALLERGIES:  No Known Allergies    CODE STATUS:  Full Code    CURRENT MEDICATIONS: Reviewed on SNF EMR     SUBJECTIVE/ROS:  GENERAL HEALTH:feels well otherwise  HEENT:+  hearing loss  RESPIRATORY: denies shortness of breath, wheezing or cough   CARDIOVASCULAR:denies chest pain, no palpitations   GI: denies nausea, vomiting, constipation, diarrhea; no rectal bleeding; no heartburn  MUSCULOSKELETAL:+ b knee pain   NEURO:migraines   PSYCHE: no symptoms of depression or anxiety  HEMATOLOGY:denies excessive bleeding  ENDOCRINE: denies excessive thirst or urination; denies unexpected wt gain or wt loss  SKIN: denies any unusual skin lesions or rashes      OBJECTIVE:  VITALS: Reviewed   LABS/Imaging: Drawn today, await results  PHYSICAL EXAM:  GENERAL HEALTH: well developed, well nourished, in no apparent distress, morbidly obese  HEENT: atraumatic/normocephalic, mucous membranes pink and moist  RESPIRATORY:clear  CARDIOVASCULAR: regular   ABDOMEN:  normal active BS+, soft, non distended, nontender   LYMPHATIC:no lymphedema  MUSCULOSKELETAL: tender B knees, able to move extremities x4  EXTREMITIES/VASCULAR:no cyanosis, clubbing or edema  LINES, TUBES, DRAINS:  none  SKIN: no rashes, no suspicious lesions  NEUROLOGIC: follows commands  PSYCHIATRIC: alert and oriented x 3; affect appropriate          ASSESSMENT/PLAN  Await clarification from ortho re WB restrictions and knee immobilizer use    Bilateral knee pain post fall   - XR: negative   - CT: R nondisplaced fx of tibial plateau,   - unable to have MRI due to body habitus   - LUKASZ done to evaluate for vascular injury   - B knee immobilizer   - NWB to BLE   - cleared for passive exercises   - F/u Dr. Cao in 2 weeks   - pain control: flexeril, oxycodone and tramadol PRN    - continue bowel regimen while taking opioids  HTN   - cont metoprolol, lisinopril  A.Fib   - cont diltiazem, BB   - cont Xarelto  CHF   - cont BB, ACE    - cont furosemide   - daily weights   - in house cardiology consult   HLD   - ? Not on sattin   Anxiety/depression   - cont trazodone   AYANNA   -  CPAP at home but not fully complaint   -  asked to bring CPAP to  [facility]   - ok to use PRN oxygen at night if CPAP not available  Morbid obesity   - weight loss advised    - in house dietitian consult   GOC   - full code   - SW assisting w/ discharge planning         Jyotsna Meier, APRN    60 minutes spent w/ patient and staff, including but not limited to/ reviewing present status, needs, abilities with disciplines, reviewing medical records, vital signs, labs, completing medication reconciliation and entering orders for continued care in [facility].    02/19/24   10:45 AM

## 2024-02-26 ENCOUNTER — SNF VISIT (OUTPATIENT)
Dept: INTERNAL MEDICINE CLINIC | Facility: SKILLED NURSING FACILITY | Age: 59
End: 2024-02-26

## 2024-02-26 DIAGNOSIS — E66.01 MORBID OBESITY (HCC): ICD-10-CM

## 2024-02-26 DIAGNOSIS — Z74.09 IMPAIRED MOBILITY: ICD-10-CM

## 2024-02-26 DIAGNOSIS — Z09 ENCOUNTER FOR FOLLOW-UP: ICD-10-CM

## 2024-02-26 DIAGNOSIS — M25.561 ACUTE PAIN OF BOTH KNEES: Primary | ICD-10-CM

## 2024-02-26 DIAGNOSIS — M25.562 ACUTE PAIN OF BOTH KNEES: Primary | ICD-10-CM

## 2024-02-26 DIAGNOSIS — S82.141D CLOSED FRACTURE OF RIGHT TIBIAL PLATEAU WITH ROUTINE HEALING, SUBSEQUENT ENCOUNTER: ICD-10-CM

## 2024-02-26 RX ORDER — ASPIRIN 81 MG/1
81 TABLET ORAL DAILY
COMMUNITY

## 2024-02-26 NOTE — PROGRESS NOTES
HPI: Deneen Chapa : 1965 Age 58 year old male patient is admitted to [facility] for DOUG    Reason for visit:  f/u Bilateral knee pain, s/p fall, R nondisplaced fx of tibial plateau, morbid obesity,A.fib, HTN, CHF    [facility]: 1/10-  [facility]: -present    This is a 59 yo male w/ past medical hx significant for HTN, depression/anxiety, DL, AYANNA, morbid obesity, CHF; who presented to [facility] on 2/10 after mechanical fall at home resulting in B knee pain. Pt was admitted for further management. XR was negative. CT w/ Right nondisplaced lateral tibial plateau fx. Pt was seen by rtho, Dr. Cao, w/ recs for MRI for further ligament injury due to instability of the knee. Pt unable to undergo MRI due to body habitus. Had LUKASZ to evaluate for vascular injury. Recs for B knee immobilizer and NWB to BLE. Pt cleared for passive exercises. Due to immobility DOUG was recommended. [facility] but [facility] was unable to be arranged in short period of time. Pt elected to be transferred to [facility] at this time.  Pt seen in bed, in no acute distress. Sts he has been getting more pain in R knee over last couple of days.  Noted w/o knee immobilizer.  Discussed w/ pt that per surgeon he should wear R knee immobilizer on at all times and Left when OOB.  Pt   sts that he will work on being more compliant w/ ortho instructions. Asking for increase in pain medicine dose.  Sts that when he takes oxycodone it does not help.  Per medical record review pt used 1 dose of oxycodone IR 10mg and no doses of tramadol or flexeril.  Per nurse, when offered tramadol, pt declining.  Discussed w/ pt that he needs to use pain medications more consistently if he is having pain.  discussed multimodal pain regimen and that opioids are not the only pain medications that can be helpful. Discussed risks associated w/ increasing opioid dose due to underlying AYANNA. Pt agreeable to add  tylenol.      Past Medical History:  Diagnosis Date   Alcohol abuse  last drink Jan 2020   Anxiety  [facility]: Dr. Denny   Arrhythmia   Atrial fibrillation (HCC)  EP cards: Dr. Sumeet Colon, PAroxysmal AFIB   Calculus of kidney   Congestive heart failure (CHF) (HCC)   Depression  [facility] psych: Dr. Denny--currently on wellbutrin as of 3/28/22, tried zoloft and didn't help   High blood pressure   Hyperlipidemia   Morbid obesity (HCC)   Obstructive sleep apnea   Prediabetes   Sleep apnea   Vertigo  Dr. Kallie Acevedo----Mirtazepine  Past Surgical History:  Procedure Laterality Date   APPENDECTOMY   COLONOSCOPY   JAW SURGERY   OTHER SURGICAL HISTORY 02/2018,2020  cardioversion, Dr. Sumeet Colon---2020   OTHER SURGICAL HISTORY 05/07/2019  Cysto-Dr. Timmons  Family History  Problem Relation Age of Onset   Aldara Father   No Known Problems Mother  Social History  Socioeconomic History   Marital status: Single  Tobacco Use   Smoking status: Never   Smokeless tobacco: Never  Vaping Use   Vaping Use: Never used  Substance and Sexual Activity   Alcohol use: No  Comment: stopped drinking 2/2018   Drug use: No  Social History Narrative  -disability--for AFIB  -single, no children  -lives alone  -no tobacco, no ETOH, no cannabis, no illicits  -no ETOh since Jan 2020  Social Determinants of Health  Food Insecurity: No Food Insecurity (2/10/2024)  Food Insecurity   Food Insecurity: Never true  Transportation Needs: No Transportation Needs (2/10/2024)  Transportation Needs   Lack of Transportation: No  Housing Stability: Low Risk (2/10/2024)  Housing Stability   Housing Instability: No  ALLERGIES:  No Known Allergies  CODE STATUS: Full Code  CURRENT MEDICATIONS: Reviewed on SNF EMR  SUBJECTIVE/ROS:  GENERAL HEALTH:feels well otherwise  HEENT:+ hearing loss  RESPIRATORY: denies shortness of breath, wheezing or cough  CARDIOVASCULAR:denies chest pain, no palpitations  GI: denies nausea, vomiting, constipation, diarrhea; no rectal bleeding; no  heartburn  MUSCULOSKELETAL:+ b knee pain  NEURO:migraines  PSYCHE: no symptoms of depression or anxiety  HEMATOLOGY:denies excessive bleeding  ENDOCRINE: denies excessive thirst or urination; denies unexpected wt gain or wt loss  SKIN: denies any unusual skin lesions or rashes  OBJECTIVE:  VITALS: Reviewed  LABS/Imaging: Ordered for 2/26: results not available yet  PHYSICAL EXAM:  GENERAL HEALTH: well developed, well nourished, in no apparent distress, morbidly obese  HEENT: atraumatic/normocephalic, mucous membranes pink and moist  RESPIRATORY:clear  CARDIOVASCULAR: regular  ABDOMEN: normal active BS+, soft, non distended, nontender  LYMPHATIC:no lymphedema  MUSCULOSKELETAL: tender B knees, able to move extremities x4, Knee immobilizers off  EXTREMITIES/VASCULAR:no cyanosis, clubbing or edema.   LINES, TUBES, DRAINS: none  SKIN: no rashes, no suspicious lesions  NEUROLOGIC: follows commands  PSYCHIATRIC:alert and oriented x 3; affect appropriate    ASSESSMENT/PLAN  Per ortho: R knee immobilizer on all all times and L knee immobilizer on when OOB.  NWB to RLE, WBAT to LLE    1. Bilateral knee pain post fall  - XR: negative  - CT: R nondisplaced fx of tibial plateau,  - unable to have MRI due to body habitus  - LUKASZ done to evaluate for vascular injury  - B knee immobilizer: R knee immobilizer on all all times and L knee immobilizer on when OOB  - NWB to RLE, LLE WBAT  - cleared for passive exercises  - F/u Dr. Cao in 2 weeks  - pain control: flexeril, oxycodone and tramadol PRN  - continue bowel regimen while taking opioids  2. HTN  - cont metoprolol, lisinopril  3. A.Fib  - cont diltiazem, BB  - cont Xarelto  4. CHF  - cont BB, ACE  - cont furosemide: refused today per nurse  - daily weights: not done   - in house cardiology consult  5. HLD  - ? Not on sattin  6. Anxiety/depression  - cont trazodone  7. AYANNA  - CPAP at home but not fully complaint  - [facility]  - ok to use PRN oxygen at night  if CPAP not available  8. Morbid obesity  - weight loss advised  - in house dietitian consult  9. GOC  - full code  - SW assisting w/ discharge planning    Jyotsna Meier, APRN  30 minutes spent w/ patient and staff, including but not limited to/ reviewing present status, needs, abilities with disciplines, reviewing medical records, vital signs, labs, completing medication reconciliation and entering orders for continued care in [facility].

## 2024-03-04 NOTE — DISCHARGE SUMMARY
General Medicine Discharge Summary     Patient ID:  Deneen Chapa  59 year old  2/28/1965    Admit date: 2/10/2024    Discharge date and time: 2/16/2024  5:30 PM     Attending Physician: No att. providers found     Consults: IP CONSULT TO CASE MANAGEMENT  IP CONSULT TO ORTHOPEDIC SURGERY    Primary Care Physician: Jenna Gutierrez MD     Reason for admission: fall    Risk For Readmission: mod    Discharge Diagnoses: Inability to ambulate due to knee [R26.2]  Contusion of knee, unspecified laterality, initial encounter [S80.00XA]  See Additional Discharge Diagnoses in Hospital Course    Discharged Condition: stable    Follow-up with labs/images appointments: follow up with ortho    Exam  Gen: No acute distress  Pulm: Lungs clear, normal respiratory effort  CV: Heart with regular rate and rhythm  Abd: Abdomen soft,       HPI:     Hospital Course:     Deneen Chapa is a 58 year old male with PMH significant for CHF, depression/anxiety, HTN, DL, AYANNA, morbid obesity presents to the ED after a mechanical fall.  Patient slipped on now complaining of bilateral knee pain.  Patient was seen and evaluated by orthopedic surgery.  Initial CT scan showed mildly comminuted and minimally displaced intra-articular fracture along the lateral tibial plateau.  MRI was recommended by orthopedic surgery for further evaluation of patient's right knee however due to patient's weight, unable to get MRI despite multiple attempts.  See notes for further details.  Patient underwent vascular study to ensure intact vasculature per orthopedic surgery.  At this time, patient was allowed to be placed in a knee brace and work with physical therapy.  He continued to improve.  Pain management was also a challenge for the patient during his course.  Eventually patient was able to tolerate p.o. pain medications and have mobility in his knees to participate in rehab's.  Patient was cleared for discharge with outpatient follow-up with orthopedic  surgery.  Cleared by orthopedic surgery for discharge.  Patient to DC to rehab.  Details below      Mechanical fall w/ shannon knee pain  Pain management  -no fractures or dislocation on xray  -Increased pain management regimen to Oxy IR-discussed concerns for respiratory depression given his size and presence of AYANNA.  -supportive measure  -PT/OT assessment pending  -CT of right knee-mildly comminuted and minimally displaced intra-articular fracture along the lateral tibial plateau  - MRI of both knees ordered given significant pain however due to patient's weight.  Earlier discussions suggested that patient's weight would be prohibitive for MRI at [facility].  Transfer process was initiated however now the weight was recalculated and was acceptable for the MRI.  Unfortunately, patient was not able to get the MRI due to size restrictions.  - Discussed case with transfer center again, unable to transfer patient elsewhere, limited by weight for external organizations as well at this time.  - Discussed case again with -will plan for LUKASZ to further assess compromise to any vasculature at this time       Paroxysmal A-fib  - Continue diltiazem, Xarelto     Morbid obesity  -has not tried any new meds or d/w PCP   -would encourage a strict change in diet and if possible/qualifies/affords new injectable wt loss meds such as ozempic/wegovy/mounjaro  - Recommend outpatient obesity clinic referral     HTN  -resume home meds     DL  -statin     AYANNA  -cpap      Depression/anxiety  -resume home meds        Operative Procedures:      Imaging: No results found.      Disposition: SNF    Activity: activity as tolerated  Diet: regular diet  Wound Care: keep wound clean and dry  Code Status: Full Code  O2: None    Home Medication Changes:     Med list     Medication List        START taking these medications      cyclobenzaprine 10 MG Tabs  Commonly known as: Flexeril  Take 1 tablet (10 mg total) by mouth 3 (three) times daily  as needed for Muscle spasms.     Naloxone HCl 4 MG/0.1ML Liqd  4 mg by Nasal route as needed. If patient remains unresponsive, repeat dose in other nostril 2-5 minutes after first dose.     oxyCODONE 10 MG Tabs  Take 1 tablet (10 mg total) by mouth every 4 (four) hours as needed.     Polyethylene Glycol 3350 17 g Pack  Commonly known as: MIRALAX     Sennosides 17.2 MG Tabs     traMADol 50 MG Tabs  Commonly known as: Ultram  Take 2 tablets (100 mg total) by mouth every 8 (eight) hours as needed for Pain.  Replaces: traMADol HCl  MG Tb24            CONTINUE taking these medications      dilTIAZem HCl ER Coated Beads 360 MG Cp24  Commonly known as: CARDIZEM CD     divalproex  MG Tb24  Commonly known as: Depakote ER     Eletriptan Hydrobromide 40 MG Tabs  Commonly known as: RELPAX     escitalopram 20 MG Tabs  Commonly known as: Lexapro     furosemide 20 MG Tabs  Commonly known as: Lasix  Take 1 tablet (20 mg total) by mouth daily.     hydrOXYzine 10 MG Tabs  Commonly known as: Atarax     lisinopril 5 MG Tabs  Commonly known as: Prinivil; Zestril  Take 1 tablet (5 mg total) by mouth daily.     Magnesium 100 MG Caps     metoprolol succinate  MG Tb24  Commonly known as: Toprol XL     niacin ER 1000 MG Tbcr  Commonly known as: Niaspan     Qulipta 60 MG Tabs  Generic drug: Atogepant     rivaroxaban 20 MG Tabs  Commonly known as: Xarelto     topiramate 50 MG Tabs  Commonly known as: TOPAMAX     traZODone 100 MG Tabs  Commonly known as: Desyrel            STOP taking these medications      traMADol HCl  MG Tb24  Commonly known as: ULTRAM-ER  Replaced by: traMADol 50 MG Tabs     zolpidem 10 MG Tabs  Commonly known as: Ambien               Where to Get Your Medications        These medications were sent to [facility] - [place], [place] - [address] [phone], [phone] [address], [place] [address]      Phone: [phone]   cyclobenzaprine 10 MG  Tabs  Naloxone HCl 4 MG/0.1ML Liqd       You can get these medications from any pharmacy    Bring a paper prescription for each of these medications  oxyCODONE 10 MG Tabs  traMADol 50 MG Tabs         FU   Follow-up Information       Jenna Gutierrez MD. Schedule an appointment as soon as possible for a visit in 3 day(s).    Specialty: Internal Medicine  Contact information:  [address]  [address]  [facility] [id number] [phone]               Sumeet Cao MD Follow up in 2 week(s).    Specialty: SURGERY, ORTHOPEDIC  Contact information:  [address]  [address]  [facility] [address] [phone]                             DC instructions:      Other Discharge Instructions:         Knee immobilizers on indefinitely.  Non weight bearing to right lower extremity.  Weightbearing as tolerated to left lower extremity with knee immobilizer on per Dr. Cao     Cleared to continue ankle pumps as well as work on active and passive knee range of motion as he is able though would do so in a nonweightbearing manner     Recommend bilateral knee MRI to better evaluate for ligamentous injury/injury burden     Follow up with orthopedic surgeon Dr. Cao as needed         Discharge medications reviewed and reconciled.    Total Time Coordinating Care: Greater than 30 minutes    Patient had opportunity to ask questions and state understand and agree with therapeutic plan as outlined    Thank You,    DO Smita Bryan Hospitalist  Pager

## 2024-03-11 ENCOUNTER — HOSPITAL ENCOUNTER (INPATIENT)
Facility: HOSPITAL | Age: 59
LOS: 4 days | Discharge: NURSING, REHAB OR LONG-TERM CARE | End: 2024-03-15
Attending: EMERGENCY MEDICINE | Admitting: INTERNAL MEDICINE
Payer: MEDICARE

## 2024-03-11 ENCOUNTER — APPOINTMENT (OUTPATIENT)
Dept: GENERAL RADIOLOGY | Facility: HOSPITAL | Age: 59
End: 2024-03-11
Payer: MEDICARE

## 2024-03-11 DIAGNOSIS — J98.01 BRONCHOSPASM: ICD-10-CM
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
## 2024-03-11 DIAGNOSIS — S80.00XA CONTUSION OF KNEE, UNSPECIFIED LATERALITY, INITIAL ENCOUNTER: ICD-10-CM

## 2024-03-11 DIAGNOSIS — J18.9 HOSPITAL-ACQUIRED PNEUMONIA: Primary | ICD-10-CM

## 2024-03-11 LAB
ADENOVIRUS PCR:: NOT DETECTED
ANION GAP SERPL CALC-SCNC: 2 MMOL/L (ref 0–18)
B PARAPERT DNA SPEC QL NAA+PROBE: NOT DETECTED
B PERT DNA SPEC QL NAA+PROBE: NOT DETECTED
BASOPHILS # BLD AUTO: 0.03 X10(3) UL (ref 0–0.2)
BASOPHILS NFR BLD AUTO: 0.5 %
BUN BLD-MCNC: 13 MG/DL (ref 9–23)
BUN/CREAT SERPL: 15.5 (ref 10–20)
C PNEUM DNA SPEC QL NAA+PROBE: NOT DETECTED
CALCIUM BLD-MCNC: 9.1 MG/DL (ref 8.7–10.4)
CHLORIDE SERPL-SCNC: 107 MMOL/L (ref 98–112)
CO2 SERPL-SCNC: 26 MMOL/L (ref 21–32)
CORONAVIRUS 229E PCR:: NOT DETECTED
CORONAVIRUS HKU1 PCR:: NOT DETECTED
CORONAVIRUS NL63 PCR:: NOT DETECTED
CORONAVIRUS OC43 PCR:: NOT DETECTED
CREAT BLD-MCNC: 0.84 MG/DL
DEPRECATED RDW RBC AUTO: 46.6 FL (ref 35.1–46.3)
EGFRCR SERPLBLD CKD-EPI 2021: 100 ML/MIN/1.73M2 (ref 60–?)
EOSINOPHIL # BLD AUTO: 0.09 X10(3) UL (ref 0–0.7)
EOSINOPHIL NFR BLD AUTO: 1.6 %
ERYTHROCYTE [DISTWIDTH] IN BLOOD BY AUTOMATED COUNT: 13.5 % (ref 11–15)
EST. AVERAGE GLUCOSE BLD GHB EST-MCNC: 171 MG/DL (ref 68–126)
FLUAV + FLUBV RNA SPEC NAA+PROBE: NEGATIVE
FLUAV + FLUBV RNA SPEC NAA+PROBE: NEGATIVE
FLUAV RNA SPEC QL NAA+PROBE: NOT DETECTED
FLUBV RNA SPEC QL NAA+PROBE: NOT DETECTED
GLUCOSE BLD-MCNC: 122 MG/DL (ref 70–99)
GLUCOSE BLDC GLUCOMTR-MCNC: 131 MG/DL (ref 70–99)
GLUCOSE BLDC GLUCOMTR-MCNC: 141 MG/DL (ref 70–99)
GLUCOSE BLDC GLUCOMTR-MCNC: 143 MG/DL (ref 70–99)
HBA1C MFR BLD: 7.6 % (ref ?–5.7)
HCT VFR BLD AUTO: 44.2 %
HGB BLD-MCNC: 14.4 G/DL
IMM GRANULOCYTES # BLD AUTO: 0.05 X10(3) UL (ref 0–1)
IMM GRANULOCYTES NFR BLD: 0.9 %
LYMPHOCYTES # BLD AUTO: 0.82 X10(3) UL (ref 1–4)
LYMPHOCYTES NFR BLD AUTO: 14.5 %
MCH RBC QN AUTO: 30.5 PG (ref 26–34)
MCHC RBC AUTO-ENTMCNC: 32.6 G/DL (ref 31–37)
MCV RBC AUTO: 93.6 FL
METAPNEUMOVIRUS PCR:: DETECTED
MONOCYTES # BLD AUTO: 0.82 X10(3) UL (ref 0.1–1)
MONOCYTES NFR BLD AUTO: 14.5 %
MRSA DNA SPEC QL NAA+PROBE: NEGATIVE
MYCOPLASMA PNEUMONIA PCR:: NOT DETECTED
NEUTROPHILS # BLD AUTO: 3.83 X10 (3) UL (ref 1.5–7.7)
NEUTROPHILS # BLD AUTO: 3.83 X10(3) UL (ref 1.5–7.7)
NEUTROPHILS NFR BLD AUTO: 68 %
OSMOLALITY SERPL CALC.SUM OF ELEC: 281 MOSM/KG (ref 275–295)
PARAINFLUENZA 1 PCR:: NOT DETECTED
PARAINFLUENZA 2 PCR:: NOT DETECTED
PARAINFLUENZA 3 PCR:: NOT DETECTED
PARAINFLUENZA 4 PCR:: NOT DETECTED
PLATELET # BLD AUTO: 168 10(3)UL (ref 150–450)
POTASSIUM SERPL-SCNC: 4.1 MMOL/L (ref 3.5–5.1)
PROCALCITONIN SERPL-MCNC: <0.04 NG/ML (ref ?–0.05)
RBC # BLD AUTO: 4.72 X10(6)UL
RHINOVIRUS/ENTERO PCR:: NOT DETECTED
RSV RNA SPEC NAA+PROBE: NEGATIVE
RSV RNA SPEC QL NAA+PROBE: NOT DETECTED
SARS-COV-2 RNA NPH QL NAA+NON-PROBE: NOT DETECTED
SARS-COV-2 RNA RESP QL NAA+PROBE: NOT DETECTED
SODIUM SERPL-SCNC: 135 MMOL/L (ref 136–145)
WBC # BLD AUTO: 5.6 X10(3) UL (ref 4–11)

## 2024-03-11 PROCEDURE — 94640 AIRWAY INHALATION TREATMENT: CPT

## 2024-03-11 PROCEDURE — 96366 THER/PROPH/DIAG IV INF ADDON: CPT

## 2024-03-11 PROCEDURE — 85025 COMPLETE CBC W/AUTO DIFF WBC: CPT | Performed by: EMERGENCY MEDICINE

## 2024-03-11 PROCEDURE — 71045 X-RAY EXAM CHEST 1 VIEW: CPT

## 2024-03-11 PROCEDURE — 83036 HEMOGLOBIN GLYCOSYLATED A1C: CPT | Performed by: NURSE PRACTITIONER

## 2024-03-11 PROCEDURE — 96365 THER/PROPH/DIAG IV INF INIT: CPT

## 2024-03-11 PROCEDURE — 96367 TX/PROPH/DG ADDL SEQ IV INF: CPT

## 2024-03-11 PROCEDURE — 84145 PROCALCITONIN (PCT): CPT | Performed by: EMERGENCY MEDICINE

## 2024-03-11 PROCEDURE — 87040 BLOOD CULTURE FOR BACTERIA: CPT | Performed by: EMERGENCY MEDICINE

## 2024-03-11 PROCEDURE — 94660 CPAP INITIATION&MGMT: CPT

## 2024-03-11 PROCEDURE — 36415 COLL VENOUS BLD VENIPUNCTURE: CPT

## 2024-03-11 PROCEDURE — 82962 GLUCOSE BLOOD TEST: CPT

## 2024-03-11 PROCEDURE — 87641 MR-STAPH DNA AMP PROBE: CPT | Performed by: NURSE PRACTITIONER

## 2024-03-11 PROCEDURE — 99285 EMERGENCY DEPT VISIT HI MDM: CPT

## 2024-03-11 PROCEDURE — 0241U SARS-COV-2/FLU A AND B/RSV BY PCR (GENEXPERT): CPT

## 2024-03-11 PROCEDURE — 0241U SARS-COV-2/FLU A AND B/RSV BY PCR (GENEXPERT): CPT | Performed by: EMERGENCY MEDICINE

## 2024-03-11 PROCEDURE — 96375 TX/PRO/DX INJ NEW DRUG ADDON: CPT

## 2024-03-11 PROCEDURE — 0202U NFCT DS 22 TRGT SARS-COV-2: CPT | Performed by: NURSE PRACTITIONER

## 2024-03-11 PROCEDURE — 80048 BASIC METABOLIC PNL TOTAL CA: CPT | Performed by: EMERGENCY MEDICINE

## 2024-03-11 RX ORDER — TRAMADOL HYDROCHLORIDE 50 MG/1
100 TABLET ORAL EVERY 8 HOURS PRN
Status: DISCONTINUED | OUTPATIENT
Start: 2024-03-11 | End: 2024-03-15

## 2024-03-11 RX ORDER — ACETAMINOPHEN 325 MG/1
650 TABLET ORAL EVERY 4 HOURS PRN
Status: DISCONTINUED | OUTPATIENT
Start: 2024-03-11 | End: 2024-03-11

## 2024-03-11 RX ORDER — OXYCODONE AND ACETAMINOPHEN 10; 325 MG/1; MG/1
1 TABLET ORAL EVERY 8 HOURS PRN
COMMUNITY
End: 2024-03-15

## 2024-03-11 RX ORDER — POLYETHYLENE GLYCOL 3350 17 G/17G
17 POWDER, FOR SOLUTION ORAL DAILY PRN
Status: DISCONTINUED | OUTPATIENT
Start: 2024-03-11 | End: 2024-03-11

## 2024-03-11 RX ORDER — ACETAMINOPHEN 500 MG
1000 TABLET ORAL EVERY 8 HOURS PRN
COMMUNITY
End: 2024-03-15

## 2024-03-11 RX ORDER — IPRATROPIUM BROMIDE AND ALBUTEROL SULFATE 2.5; .5 MG/3ML; MG/3ML
3 SOLUTION RESPIRATORY (INHALATION) ONCE
Status: COMPLETED | OUTPATIENT
Start: 2024-03-11 | End: 2024-03-11

## 2024-03-11 RX ORDER — ENEMA 19; 7 G/133ML; G/133ML
1 ENEMA RECTAL ONCE AS NEEDED
Status: DISCONTINUED | OUTPATIENT
Start: 2024-03-11 | End: 2024-03-15

## 2024-03-11 RX ORDER — DILTIAZEM HYDROCHLORIDE 5 MG/ML
15 INJECTION INTRAVENOUS ONCE
Status: COMPLETED | OUTPATIENT
Start: 2024-03-11 | End: 2024-03-11

## 2024-03-11 RX ORDER — HYDROCODONE BITARTRATE AND ACETAMINOPHEN 5; 325 MG/1; MG/1
2 TABLET ORAL EVERY 4 HOURS PRN
Status: DISCONTINUED | OUTPATIENT
Start: 2024-03-11 | End: 2024-03-11

## 2024-03-11 RX ORDER — DILTIAZEM HYDROCHLORIDE 5 MG/ML
10 INJECTION INTRAVENOUS ONCE
Status: COMPLETED | OUTPATIENT
Start: 2024-03-11 | End: 2024-03-11

## 2024-03-11 RX ORDER — ENOXAPARIN SODIUM 100 MG/ML
40 INJECTION SUBCUTANEOUS DAILY
Status: DISCONTINUED | OUTPATIENT
Start: 2024-03-11 | End: 2024-03-11

## 2024-03-11 RX ORDER — OXYCODONE AND ACETAMINOPHEN 10; 325 MG/1; MG/1
1 TABLET ORAL EVERY 4 HOURS PRN
Status: DISCONTINUED | OUTPATIENT
Start: 2024-03-11 | End: 2024-03-12

## 2024-03-11 RX ORDER — SENNOSIDES 8.6 MG
17.2 TABLET ORAL NIGHTLY PRN
Status: DISCONTINUED | OUTPATIENT
Start: 2024-03-11 | End: 2024-03-15

## 2024-03-11 RX ORDER — SODIUM CHLORIDE 9 MG/ML
INJECTION, SOLUTION INTRAVENOUS CONTINUOUS
Status: DISCONTINUED | OUTPATIENT
Start: 2024-03-11 | End: 2024-03-11

## 2024-03-11 RX ORDER — FUROSEMIDE 10 MG/ML
40 INJECTION INTRAMUSCULAR; INTRAVENOUS DAILY
Status: DISCONTINUED | OUTPATIENT
Start: 2024-03-11 | End: 2024-03-12

## 2024-03-11 RX ORDER — IPRATROPIUM BROMIDE AND ALBUTEROL SULFATE 2.5; .5 MG/3ML; MG/3ML
3 SOLUTION RESPIRATORY (INHALATION) EVERY 6 HOURS PRN
Status: DISCONTINUED | OUTPATIENT
Start: 2024-03-11 | End: 2024-03-15

## 2024-03-11 RX ORDER — ACETAMINOPHEN 500 MG
1000 TABLET ORAL EVERY 8 HOURS PRN
Status: DISCONTINUED | OUTPATIENT
Start: 2024-03-11 | End: 2024-03-11

## 2024-03-11 RX ORDER — HYDROCODONE BITARTRATE AND ACETAMINOPHEN 5; 325 MG/1; MG/1
1 TABLET ORAL EVERY 4 HOURS PRN
Status: DISCONTINUED | OUTPATIENT
Start: 2024-03-11 | End: 2024-03-11

## 2024-03-11 RX ORDER — CYCLOBENZAPRINE HCL 5 MG
10 TABLET ORAL 3 TIMES DAILY PRN
Status: DISCONTINUED | OUTPATIENT
Start: 2024-03-11 | End: 2024-03-15

## 2024-03-11 RX ORDER — IPRATROPIUM BROMIDE AND ALBUTEROL SULFATE 2.5; .5 MG/3ML; MG/3ML
3 SOLUTION RESPIRATORY (INHALATION) EVERY 6 HOURS PRN
Status: DISCONTINUED | OUTPATIENT
Start: 2024-03-11 | End: 2024-03-11

## 2024-03-11 RX ORDER — BENZONATATE 200 MG/1
200 CAPSULE ORAL 3 TIMES DAILY PRN
Status: DISCONTINUED | OUTPATIENT
Start: 2024-03-11 | End: 2024-03-15

## 2024-03-11 RX ORDER — LISINOPRIL 2.5 MG/1
5 TABLET ORAL DAILY
Status: DISCONTINUED | OUTPATIENT
Start: 2024-03-11 | End: 2024-03-13

## 2024-03-11 RX ORDER — ACETAMINOPHEN 325 MG/1
650 TABLET ORAL EVERY 6 HOURS PRN
Status: DISCONTINUED | OUTPATIENT
Start: 2024-03-11 | End: 2024-03-15

## 2024-03-11 RX ORDER — TRAZODONE HYDROCHLORIDE 100 MG/1
200 TABLET ORAL NIGHTLY
Status: DISCONTINUED | OUTPATIENT
Start: 2024-03-11 | End: 2024-03-15

## 2024-03-11 RX ORDER — PROCHLORPERAZINE EDISYLATE 5 MG/ML
5 INJECTION INTRAMUSCULAR; INTRAVENOUS EVERY 8 HOURS PRN
Status: DISCONTINUED | OUTPATIENT
Start: 2024-03-11 | End: 2024-03-15

## 2024-03-11 RX ORDER — ACETAMINOPHEN 500 MG
1000 TABLET ORAL ONCE
Status: COMPLETED | OUTPATIENT
Start: 2024-03-11 | End: 2024-03-11

## 2024-03-11 RX ORDER — POLYETHYLENE GLYCOL 3350 17 G/17G
17 POWDER, FOR SOLUTION ORAL DAILY PRN
Status: DISCONTINUED | OUTPATIENT
Start: 2024-03-11 | End: 2024-03-15

## 2024-03-11 RX ORDER — VANCOMYCIN 2 GRAM/500 ML IN 0.9 % SODIUM CHLORIDE INTRAVENOUS
2000 ONCE
Status: COMPLETED | OUTPATIENT
Start: 2024-03-11 | End: 2024-03-11

## 2024-03-11 RX ORDER — DEXTROSE MONOHYDRATE 25 G/50ML
50 INJECTION, SOLUTION INTRAVENOUS
Status: DISCONTINUED | OUTPATIENT
Start: 2024-03-11 | End: 2024-03-15

## 2024-03-11 RX ORDER — ASPIRIN 81 MG/1
81 TABLET ORAL DAILY
Status: DISCONTINUED | OUTPATIENT
Start: 2024-03-11 | End: 2024-03-15

## 2024-03-11 RX ORDER — ONDANSETRON 2 MG/ML
4 INJECTION INTRAMUSCULAR; INTRAVENOUS EVERY 6 HOURS PRN
Status: DISCONTINUED | OUTPATIENT
Start: 2024-03-11 | End: 2024-03-15

## 2024-03-11 RX ORDER — OXYCODONE AND ACETAMINOPHEN 10; 325 MG/1; MG/1
1 TABLET ORAL 2 TIMES DAILY
COMMUNITY
End: 2024-03-15

## 2024-03-11 RX ORDER — BISACODYL 10 MG
10 SUPPOSITORY, RECTAL RECTAL
Status: DISCONTINUED | OUTPATIENT
Start: 2024-03-11 | End: 2024-03-15

## 2024-03-11 RX ORDER — MELATONIN
3 NIGHTLY PRN
Status: DISCONTINUED | OUTPATIENT
Start: 2024-03-11 | End: 2024-03-11

## 2024-03-11 RX ORDER — METOPROLOL SUCCINATE 100 MG/1
100 TABLET, EXTENDED RELEASE ORAL 2 TIMES DAILY
Status: DISCONTINUED | OUTPATIENT
Start: 2024-03-11 | End: 2024-03-15

## 2024-03-11 RX ORDER — ACETAMINOPHEN 500 MG
500 TABLET ORAL EVERY 4 HOURS PRN
Status: DISCONTINUED | OUTPATIENT
Start: 2024-03-11 | End: 2024-03-11

## 2024-03-11 RX ORDER — NICOTINE POLACRILEX 4 MG
30 LOZENGE BUCCAL
Status: DISCONTINUED | OUTPATIENT
Start: 2024-03-11 | End: 2024-03-15

## 2024-03-11 RX ORDER — SENNOSIDES 8.6 MG
17.2 TABLET ORAL NIGHTLY PRN
Status: DISCONTINUED | OUTPATIENT
Start: 2024-03-11 | End: 2024-03-11

## 2024-03-11 RX ORDER — NICOTINE POLACRILEX 4 MG
15 LOZENGE BUCCAL
Status: DISCONTINUED | OUTPATIENT
Start: 2024-03-11 | End: 2024-03-15

## 2024-03-11 RX ORDER — DILTIAZEM HYDROCHLORIDE 180 MG/1
360 CAPSULE, EXTENDED RELEASE ORAL DAILY
Status: DISCONTINUED | OUTPATIENT
Start: 2024-03-11 | End: 2024-03-13

## 2024-03-11 NOTE — H&P
[facility]   Hospitalist Team  History and Physical  Admit Date:  3/11/2024    Is this a shared or split note between Advanced Practice Provider and Physician? Yes    ASSESSMENT / PLAN:   Deneen Chapa is a 59 year old male with PMH significant for CHF-diastolic, insomnia, migraines, afib, secondary hypercoagulable state, insomnia, HTN, HL, DM- diet controlled, AYANNA, morbid obesity with recent admission to [facility] with  a mechanical fall with B/L Knee Pain  found to have a right tibial plateau fx and possible Left MCL strain who presents from [facility] with cough, sob and fevers found to have metapneumovirus and possible pna,  see below for details     Fever/Cough/SOB 2/2 Metapneumovirus possible PNA   -tmax 100.8. WBC normal. PCT normal   -CXR preliminary with consolidation Left lung   -negative for influenza, covid and RSV  -bloood cx pending  -RVP + for metapneumovirus   -MRSA screen ordered   -not felt to  have pe as pt on xarelto  -IV lasix ordered with LE edema and missing lasix doses, bnp pending  -abx- cefepime and vanco  -on RA    Mechanical fall w/ shannon knee pain  Right Tibial Fx  Possible Left MCL Sprain   -work up at [facility] 1)CT of right knee-mildly comminuted and minimally displaced intra-articular fracture along the lateral tibial plateau 2) unable to get MRI's of knees do to wt 3) r/o for vascular disease with bernice  -pain meds-oxy, flexeril and tramadol  -per [facility] notes- NWB RLE (can do active and passive ROM in NWB status) and WBAT to LLE, needs to wear immobilizers on right at all times, may remove mobilizers on left leg when in bed  -follow up with Dr Cook     DM Type II  -HgbA1C 6/1/2023 6.7, repeat pending  -SSI prn  -accuchecks  -ADA diet     Atrial fibrillation with RVR  Secondary Hypercoagulable State  HTN  HL-not on meds  Chronic CHF, diastolic   -2023 echo with EF 45-50%  -tele  -CXR not suggestive of CHF but noted LE edema. BNP pending   -s/p iv cardizem 15 mg iv in ER  -resume home  meds-asa, Cardizem, Toprol, lisinopril and xarelto   -Hold home Lasix and give IV Lasix with noted lower extremity edema    AYANNA  -cpap     Insomnia   -continue home meds-currently only taking trazodone    Migraines  Dizziness   -per med rec no longer taking topiramate, qulipta or eletriptan  -follows with Dr Acevedo-neurology     Morbid Obesity  -PER [facility] notes--he briefly tried metformin, has not tried any new meds or d/w PCP. Encourage a strict change in diet and if possible/qualifies/affords new injectable wt loss meds such as ozempic/wegovy/mounjaro  -recommend follow up with pcp     [facility]  -full code  -POA father     MA/ACO Reach  -Re- Entry: 2/16 discharge from [facility] to [facility]  -Consults: none  -Discharge Needs: return to [facility]  -Appointments: follow up Dr Jenna Gutierrez MD within 1 week of discharge from rehab     [facility] in am  -diet-ADA    Prophy  -SCD  -xarelto     Dispo  -pending clinical course  PCP: Jenna Gutierrez MD       Outpatient records or previous hospital records reviewed.   Further recommendations pending patient's clinical course.  [facility] hospitalist  team to continue to follow patient while in house  Concerns regarding plan of care were discussed with patient. Patient agrees with plan as detailed above. Discussed plan of care with Dr. Prince     Note: This chart was prepared using voice recognition software and may contain unintended word substitution errors.     Susu Cameron RN, NP  [facility] Hospitalist Team   Available via Perfect Serve or Bubble Chat (check Availability)    3/11/2024      HISTORY:   CC:   Chief Complaint   Patient presents with    Cough        PCP: Jenna Gutierrez MD    History of Present Illness:     Deneen Chapa is a 59  year old male with PMH significant for CHF-diastolic, insomnia, migraines, afib, secondary hypercoagulable state, insomnia, HTN, HL, DM- diet controlled, AYANNA, morbid obesity with recent admission to [facility] with  a mechanical fall with B/L Knee Pain   found to have a right tibial plateau fx and possible Left MCL strain    History obtained from patient in the emergency room.  He states that he has been progressing in therapy at [facility] from a mobility perspective and has been there about 3 weeks.  About 4 days ago he noted nonproductive cough and fever of ~99.  He states he is having difficulty breathing.  He does have a history of sleep apnea and does use CPAP.  He also has a history of congestive heart failure related to atrial fibrillation and states he has refused a few doses of his water pills however the dosages are higher than he normally takes.  Upon seeing patient in the emergency room he he has issues with coughing.  He is on room air.  He denied any sore throat sinus pain or ear pain.  He states that he has not been around anybody that he knows that is actively sick other than the staff at the rehab center. CXR suggestive of PNA.  He has noted to be in A-fib with heart rate in the low 100s after getting 15 mg of IV push Cardizem.  He does have he states he continues to have pain in his knees and that the pain medication is not helping however they will not increase the dosages.      Review of Systems  12 point systems reviewed, please see HPI for pertinent positives, otherwise negative    OBJECTIVE:  /85   Pulse 107   Temp (!) 100.8 °F (38.2 °C) (Temporal)   Resp (!) 27   SpO2 94%     GENERAL: coughing, overwt  NEUROLOGIC: A/A; Ox3  SKIN: no rashes  RESPIRATORY: normal expansion; non labored, CTA   CARDIOVASCULAR: irregular, tachy  ABDOMEN:  Soft, BS+; non distended, non tender    EXTREMITIES: pitting edema B/L LE    PMH  Past Medical History:   Diagnosis Date    Alcohol abuse     last drink Jan 2020    Anxiety     [facility] Dr. Denny    Arrhythmia     Atrial fibrillation (HCC)     EP cards: Dr. Sumeet Colon, PAroxysmal AFIB    Calculus of kidney     Congestive heart failure (CHF) ([facility])     Depression     [facility] psych: Dr. Denny--currently on  wellbutrin as of 3/28/22, tried zoloft and didn't help    High blood pressure     Hyperlipidemia     Morbid obesity (HCC)     Obstructive sleep apnea     Prediabetes     Sleep apnea     Vertigo     Dr. Kallie Acevedo----Mirtazepine        PSH  Past Surgical History:   Procedure Laterality Date    APPENDECTOMY      COLONOSCOPY      JAW SURGERY      OTHER SURGICAL HISTORY  02/2018, 2020    cardioversion, Dr. Sumeet Colon---2020    OTHER SURGICAL HISTORY  05/07/2019    Cysto-Dr. Timmons        ALL:  No Known Allergies     Home Medications:  Outpatient Medications Marked as Taking for the 3/11/24 encounter (Hospital Encounter)   Medication Sig Dispense Refill    acetaminophen 500 MG Oral Tab Take 2 tablets (1,000 mg total) by mouth every 8 (eight) hours as needed for Pain.      guaiFENesin 100 MG/5 ML Oral Take 5 mL (100 mg total) by mouth every 8 (eight) hours as needed (cough).      oxyCODONE-acetaminophen  MG Oral Tab Take 1 tablet by mouth every 8 (eight) hours as needed for Pain.      oxyCODONE-acetaminophen  MG Oral Tab Take 1 tablet by mouth in the morning and 1 tablet before bedtime.         Soc Hx  Social History     Tobacco Use    Smoking status: Never    Smokeless tobacco: Never   Substance Use Topics    Alcohol use: No     Comment: stopped drinking 2/2018        Fam Hx  Family History   Problem Relation Age of Onset    Aldara Father     No Known Problems Mother          DIAGNOSTIC DATA:   CBC/Chem  Recent Labs   Lab 03/11/24  0633   WBC 5.6   HGB 14.4   MCV 93.6   .0       Recent Labs   Lab 03/11/24  0633   *   K 4.1      CO2 26.0   BUN 13   CREATSERUM 0.84   *   CA 9.1         SEE ATTENDING NOTE BELOW    Patient seen and examined.  Agree with documentation as stated above    59-year-old male with multiple past medical conditions, with a recent admission to [facility] presents to the hospital for evaluation of cough, shortness of breath, fever.    Vitals:    03/11/24 1041   BP: 111/86    Pulse: 79   Resp: 24   Temp: 99.8 °F (37.7 °C)   Gen: comfortable  Chest: non tender  Lungs: clear  Heart RRR  Legs Trace to +1 edema     A/P    Cough, fever  -symptoms appear viral in nature  -human metapneumovirus noted   -on IV antibiotics.   -if mrsa screen negative can stop vancomycin  -will narrow antibiotics appropriately pending progress  -await urine culture     Chronic diastolic CHF  -agree with one dose of IV lasix today    AYANNA  -use cpap here with sleep     Remainder as stated above in NP note    Asrar Suresh DO

## 2024-03-11 NOTE — ED PROVIDER NOTES
Patient Seen in: [facility] Emergency Department      History     Chief Complaint   Patient presents with    Cough     Stated Complaint: cough    Subjective:   HPI    59-year-old male recently admitted to [facility] for fractured knee living at [facility] requiring assistance with shortness of breath and cough for few days.  No reported history of underlying lung disease.  Positive cardiac history.  no recent reported antibiotics.  No known fever.  Given a neb before I saw him in the room.      Objective:   Past Medical History:   Diagnosis Date    Alcohol abuse     last drink Jan 2020    Anxiety     [facility] psych: Dr. Denny    Arrhythmia     Atrial fibrillation (HCC)     EP cards: Dr. Sumeet Colon, PAroxysmal AFIB    Calculus of kidney     Congestive heart failure (CHF) (HCC)     Depression     [facility] psych: Dr. Denny--currently on wellbutrin as of 3/28/22, tried zoloft and didn't help    High blood pressure     Hyperlipidemia     Morbid obesity (HCC)     Obstructive sleep apnea     Prediabetes     Sleep apnea     Vertigo     Dr. Kallie Acevedo----Mirtazepine              Past Surgical History:   Procedure Laterality Date    APPENDECTOMY      COLONOSCOPY      JAW SURGERY      OTHER SURGICAL HISTORY  02/2018, 2020    cardioversion, Dr. Sumeet Colon---2020    OTHER SURGICAL HISTORY  05/07/2019    Cysto-Dr. Timmons                Social History     Socioeconomic History    Marital status: Single   Tobacco Use    Smoking status: Never    Smokeless tobacco: Never   Vaping Use    Vaping Use: Never used   Substance and Sexual Activity    Alcohol use: No     Comment: stopped drinking 2/2018    Drug use: No   Social History Narrative    -disability--for AFIB    -single, no children    -lives alone    -no tobacco, no ETOH, no cannabis, no illicits    -no ETOh since Jan 2020     Social Determinants of Health     Food Insecurity: No Food Insecurity (3/11/2024)    Food Insecurity     Food Insecurity: Never true    Transportation Needs: No Transportation Needs (3/11/2024)    Transportation Needs     Lack of Transportation: No   Housing Stability: Low Risk  (3/11/2024)    Housing Stability     Housing Instability: No              Review of Systems    Positive for stated complaint: cough  Other systems are as noted in HPI.  Constitutional and vital signs reviewed.      All other systems reviewed and negative except as noted above.    Physical Exam     ED Triage Vitals   BP 03/11/24 0308 (!) 116/91   Pulse 03/11/24 0308 88   Resp 03/11/24 0308 20   Temp 03/11/24 0308 99.4 °F (37.4 °C)   Temp src 03/11/24 0822 Temporal   SpO2 03/11/24 0308 91 %   O2 Device 03/11/24 0308 None (Room air)       Current:/67 (BP Location: Right arm)   Pulse 92   Temp 99.5 °F (37.5 °C) (Oral)   Resp 18   Ht 177.8 cm (5' 10\")   Wt (!) 222.5 kg   SpO2 97%   BMI 70.39 kg/m²         Physical Exam    Constitutional: Oriented to person, place, and time.  Appears well-developed. No distress.  Morbidly obese  Head: Normocephalic and atraumatic.   Eyes: Conjunctivae are normal. Pupils are equal, round, and reactive to light.   Neck: Normal range of motion. Neck supple.   Cardiovascular: Normal rate, regular rhythm and intact distal pulses.    Pulmonary/Chest: Effort normal. No respiratory distress.  Diminished lung sounds and wheeze bilaterally.  Abdominal: Soft. There is no tenderness. There is no guarding.   Musculoskeletal: Normal range of motion. No edema or tenderness.   Neurological: Alert and oriented to person, place, and time.   Skin: Skin is warm and dry.   Psychiatric: Normal mood and affect.  Behavior is normal.   Nursing note and vitals reviewed.    Differential diagnosis includes acute viral syndrome, acute bronchospasm, bronchopneumonia, pleural effusion, dehydration, renal insufficiency.      ED Course     Labs Reviewed   BASIC METABOLIC PANEL (8) - Abnormal; Notable for the following components:       Result Value    Glucose 122  (*)     Sodium 135 (*)     All other components within normal limits   HEMOGLOBIN A1C - Abnormal; Notable for the following components:    HgbA1C 7.6 (*)     Estimated Average Glucose 171 (*)     All other components within normal limits   BASIC METABOLIC PANEL (8) - Abnormal; Notable for the following components:    Glucose 121 (*)     Sodium 134 (*)     All other components within normal limits   POCT GLUCOSE - Abnormal; Notable for the following components:    POC Glucose  131 (*)     All other components within normal limits   POCT GLUCOSE - Abnormal; Notable for the following components:    POC Glucose  141 (*)     All other components within normal limits   POCT GLUCOSE - Abnormal; Notable for the following components:    POC Glucose  143 (*)     All other components within normal limits   RESPIRATORY FLU EXPAND PANEL + COVID-19 - Abnormal; Notable for the following components:    Metapneumovirus PCR: Detected (*)     All other components within normal limits    Narrative:     This test is intended for the simultaneous qualitative detection and differentiation of nucleic acids from multiple viral and bacterial respiratory organisms, including nucleic acid from Severe Acute Respiratory Syndrome Coronavirus 2 (SARS-CoV-2) in nasopharyngeal swab from individuals suspected of respiratory viral infection consistent with COVID-19 by their healthcare provider.    Test performed using the Minervax Respiratory Panel 2.1 (RP2.1) assay on the CSDN 2.0 System, [employer], [place] [address].    This test is being used under the Food and Drug Administration's Emergency Use Authorization.    The authorized Fact Sheet for Healthcare Providers for this assay is available upon request from the laboratory.    SARS and MERS coronaviruses are not tested on this assay.   CBC W/ DIFFERENTIAL - Abnormal; Notable for the following components:    RDW-SD 46.6 (*)     Lymphocyte Absolute 0.82 (*)     All other  components within normal limits   CBC W/ DIFFERENTIAL - Abnormal; Notable for the following components:    .0 (*)     All other components within normal limits   PROCALCITONIN - Normal   BNP (B TYPE NATRIURETIC PEPTIDE) - Normal   SARS-COV-2/FLU A AND B/RSV BY PCR (GENEXPERT) - Normal    Narrative:     This test is intended for the qualitative detection and differentiation of SARS-CoV-2, influenza A, influenza B, and respiratory syncytial virus (RSV) viral RNA in nasopharyngeal or nares swabs from individuals suspected of respiratory viral infection consistent with COVID-19 by their healthcare provider. Signs and symptoms of respiratory viral infection due to SARS-CoV-2, influenza, and RSV can be similar.    Test performed using the Xpert Xpress SARS-CoV-2/FLU/RSV (real time RT-PCR)  assay on the [employer] instrument, [employer], [place] [address].   This test is being used under the Food and Drug Administration's Emergency Use Authorization.    The authorized Fact Sheet for Healthcare Providers for this assay is available upon request from the laboratory.   MRSA SCREEN BY PCR - Normal   CBC WITH DIFFERENTIAL WITH PLATELET    Narrative:     The following orders were created for panel order CBC With Differential With Platelet.  Procedure                               Abnormality         Status                     ---------                               -----------         ------                     CBC W/ DIFFERENTIAL[[id number]]          Abnormal            Final result                 Please view results for these tests on the individual orders.   CBC WITH DIFFERENTIAL WITH PLATELET    Narrative:     The following orders were created for panel order CBC With Differential With Platelet.  Procedure                               Abnormality         Status                     ---------                               -----------         ------                     CBC W/ DIFFERENTIAL[[id number]]          Abnormal             Final result                 Please view results for these tests on the individual orders.   BLOOD CULTURE    Narrative:     Anaerobic Bottle Volume - 7 mL  Aerobic Bottle Volume - 7 mL   BLOOD CULTURE             XR CHEST AP PORTABLE  (CPT=71045)    Result Date: 3/11/2024  PROCEDURE: XR CHEST AP PORTABLE  (CPT=71045) TIME: 4:37 a.m..   COMPARISON: [facility], XR CHEST AP PORTABLE (CPT=71045), 11/29/2022, 9:14 AM.  INDICATIONS: cough  TECHNIQUE:   Single view.   FINDINGS:  CARDIAC/VASC: Heart size and pulmonary vascularity normal. MEDIAST/LISBETH:   No visible mass or adenopathy. LUNGS/PLEURA: Patchy left perihilar opacity.  Right lung clear. BONES: No fracture or visible bony lesion. OTHER: Negative.          CONCLUSION:  1. Left perihilar pneumonia.  Follow-up to complete radiographic resolution recommended.    Dictated by (CST): Manuel Portillo MD on 3/11/2024 at 2:06 PM     Finalized by (CST): Manuel Portillo MD on 3/11/2024 at 2:07 PM            [facility]        Admission disposition: 3/11/2024  7:09 AM                                        Medical Decision Making  X-ray reading concerning for left perihilar pneumonia.  Borderline hypoxic here.  Given 1 neb as stated.  Will do broad-spectrum antibiotic coverage given patient is at a senior living facility and was recently admitted.  I spoke with the [facility] hospitalist.  They are agreeable to admission.    Problems Addressed:  Bronchospasm: acute illness or injury  Hospital-acquired pneumonia: acute illness or injury with systemic symptoms that poses a threat to life or bodily functions    Amount and/or Complexity of Data Reviewed  External Data Reviewed: notes.     Details: [facility] admit    Admit date: 2/10/2024     Discharge date and time: 2/16/2024  5:30 PM      Attending Physician: No att. providers found      Consults: IP CONSULT TO CASE MANAGEMENT  IP CONSULT TO ORTHOPEDIC SURGERY     Primary Care Physician: Jenna Gutierrez MD      Reason for  admission: fall     Risk For Readmission: mod     Discharge Diagnoses: Inability to ambulate due to knee [R26.2]  Contusion of knee, unspecified laterality, initial encounter [S80.00XA]  See Additional Discharge Diagnoses in Hospital Course     Discharged Condition: stable     Follow-up with labs/images appointments: follow up with ortho     Exam  Gen: No acute distress  Pulm: Lungs clear, normal respiratory effort  CV: Heart with regular rate and rhythm  Abd: Abdomen soft,         HPI:      Hospital Course:      Deneen Chapa is a 58 year old male with PMH significant for CHF, depression/anxiety, HTN, DL, AYANNA, morbid obesity presents to the ED after a mechanical fall.  Patient slipped on now complaining of bilateral knee pain.  Patient was seen and evaluated by orthopedic surgery.  Initial CT scan showed mildly comminuted and minimally displaced intra-articular fracture along the lateral tibial plateau.  MRI was recommended by orthopedic surgery for further evaluation of patient's right knee however due to patient's weight, unable to get MRI despite multiple attempts.  See notes for further details.  Patient underwent vascular study to ensure intact vasculature per orthopedic surgery.  At this time, patient was allowed to be placed in a knee brace and work with physical therapy.  He continued to improve.  Pain management was also a challenge for the patient during his course.  Eventually patient was able to tolerate p.o. pain medications and have mobility in his knees to participate in rehab's.  Patient was cleared for discharge with outpatient follow-up with orthopedic surgery.  Cleared by orthopedic surgery for discharge.  Patient to DC to rehab.  Details below        Mechanical fall w/ shannon knee pain  Pain management  -no fractures or dislocation on xray  -Increased pain management regimen to Oxy IR-discussed concerns for respiratory depression given his size and presence of AYANNA.  -supportive measure  -PT/OT  assessment pending  -CT of right knee-mildly comminuted and minimally displaced intra-articular fracture along the lateral tibial plateau  - MRI of both knees ordered given significant pain however due to patient's weight.  Earlier discussions suggested that patient's weight would be prohibitive for MRI at [facility].  Transfer process was initiated however now the weight was recalculated and was acceptable for the MRI.  Unfortunately, patient was not able to get the MRI due to size restrictions.  - Discussed case with transfer center again, unable to transfer patient elsewhere, limited by weight for external organizations as well at this time.  - Discussed case again with -will plan for LUKASZ to further assess compromise to any vasculature at this time        Paroxysmal A-fib  - Continue diltiazem, Xarelto     Morbid obesity  -has not tried any new meds or d/w PCP   -would encourage a strict change in diet and if possible/qualifies/affords new injectable wt loss meds such as ozempic/wegovy/mounjaro  - Recommend outpatient obesity clinic referral     HTN  -resume home meds     DL  -statin     AYANNA  -cpap      Depression/anxiety  -resume home meds           Operative Procedures:       Imaging: No results found.        Disposition: SNF    Labs: ordered. Decision-making details documented in ED Course.  Radiology: ordered and independent interpretation performed. Decision-making details documented in ED Course.     Details: My gross review of the chest x-ray there appears to be evidence of a possible left perihilar infiltrate or effusion at the left base    Risk  Drug therapy requiring intensive monitoring for toxicity.  Decision regarding hospitalization.        Disposition and Plan     Clinical Impression:  1. Hospital-acquired pneumonia    2. Bronchospasm         Disposition:  Admit  3/11/2024  7:09 am    Follow-up:  Jenna Gutierrez MD  [address]  [address]  [facility] [address]  [phone]    Follow  up      Sumeet Cao MD  [address]  [address]  [facility] [address]  [phone]    Follow up on 3/18/2024  3/18 9:45am          Medications Prescribed:  Current Discharge Medication List                            Hospital Problems       Present on Admission  Date Reviewed: 12/14/2021            ICD-10-CM Noted POA    * (Principal) Hospital-acquired pneumonia J18.9, Y95 3/11/2024 Unknown    Bronchospasm J98.01 3/11/2024 Unknown

## 2024-03-11 NOTE — CM/SW NOTE
SW self-referred to case for discharge planning.    Pt discussed in nursing rounds- admitted from [facility] after a knee fracture (discharged to rehab from [facility]).    Pt originally from home alone.    Return referral sent in [facility].    SW will submit to [facility] following MD notes/therapy notes.    If pt has skilled needs, auth can be submitted.    Pt has Medicaid secondary.    PLAN: DC to [facility] pending CLRC/auth    / to remain available for support and/or discharge planning.     Marivel Padron MSW, LSW [phone]

## 2024-03-11 NOTE — ED QUICK NOTES
Orders for admission, patient is aware of plan and ready to go upstairs. Any questions, please call ED RN Jack at extension [phone].     Patient Covid vaccination status: Fully vaccinated     COVID Test Ordered in ED: SARS-CoV-2/Flu A and B/RSV by PCR (GeneXpert)    COVID Suspicion at Admission: N/A    Running Infusions:  None    Mental Status/LOC at time of transport: A&Ox4    Other pertinent information: on RA.  Ordered breakfast tray.  CIWA score: N/A   NIH score:  N/A

## 2024-03-11 NOTE — RESPIRATORY THERAPY NOTE
AYANNA ASSESSMENT:    Pt does have a previous diagnosis of AYANNA. Pt does routinely use a CPAP device at home. This pt will be provide a cpap machine

## 2024-03-11 NOTE — ED INITIAL ASSESSMENT (HPI)
Cough x 3 days. Taking guaifenesin without relief from [facility] [employer] [facility]. States that the coughing fits cause him to lose his breath.

## 2024-03-12 ENCOUNTER — APPOINTMENT (OUTPATIENT)
Dept: PICC SERVICES | Facility: HOSPITAL | Age: 59
End: 2024-03-12
Attending: INTERNAL MEDICINE
Payer: MEDICARE

## 2024-03-12 LAB
ANION GAP SERPL CALC-SCNC: 4 MMOL/L (ref 0–18)
BASOPHILS # BLD AUTO: 0.03 X10(3) UL (ref 0–0.2)
BASOPHILS NFR BLD AUTO: 0.6 %
BNP SERPL-MCNC: 17 PG/ML
BUN BLD-MCNC: 11 MG/DL (ref 9–23)
BUN/CREAT SERPL: 14.9 (ref 10–20)
CALCIUM BLD-MCNC: 9 MG/DL (ref 8.7–10.4)
CHLORIDE SERPL-SCNC: 105 MMOL/L (ref 98–112)
CO2 SERPL-SCNC: 25 MMOL/L (ref 21–32)
CREAT BLD-MCNC: 0.74 MG/DL
DEPRECATED RDW RBC AUTO: 45.2 FL (ref 35.1–46.3)
EGFRCR SERPLBLD CKD-EPI 2021: 104 ML/MIN/1.73M2 (ref 60–?)
EOSINOPHIL # BLD AUTO: 0.05 X10(3) UL (ref 0–0.7)
EOSINOPHIL NFR BLD AUTO: 0.9 %
ERYTHROCYTE [DISTWIDTH] IN BLOOD BY AUTOMATED COUNT: 13.4 % (ref 11–15)
GLUCOSE BLD-MCNC: 121 MG/DL (ref 70–99)
GLUCOSE BLDC GLUCOMTR-MCNC: 116 MG/DL (ref 70–99)
GLUCOSE BLDC GLUCOMTR-MCNC: 125 MG/DL (ref 70–99)
GLUCOSE BLDC GLUCOMTR-MCNC: 137 MG/DL (ref 70–99)
GLUCOSE BLDC GLUCOMTR-MCNC: 168 MG/DL (ref 70–99)
HCT VFR BLD AUTO: 41.8 %
HGB BLD-MCNC: 14.5 G/DL
IMM GRANULOCYTES # BLD AUTO: 0.04 X10(3) UL (ref 0–1)
IMM GRANULOCYTES NFR BLD: 0.7 %
L PNEUMO AG UR QL: NEGATIVE
LYMPHOCYTES # BLD AUTO: 1.23 X10(3) UL (ref 1–4)
LYMPHOCYTES NFR BLD AUTO: 23 %
MCH RBC QN AUTO: 31.7 PG (ref 26–34)
MCHC RBC AUTO-ENTMCNC: 34.7 G/DL (ref 31–37)
MCV RBC AUTO: 91.3 FL
MONOCYTES # BLD AUTO: 1 X10(3) UL (ref 0.1–1)
MONOCYTES NFR BLD AUTO: 18.7 %
NEUTROPHILS # BLD AUTO: 2.99 X10 (3) UL (ref 1.5–7.7)
NEUTROPHILS # BLD AUTO: 2.99 X10(3) UL (ref 1.5–7.7)
NEUTROPHILS NFR BLD AUTO: 56.1 %
OSMOLALITY SERPL CALC.SUM OF ELEC: 279 MOSM/KG (ref 275–295)
PLATELET # BLD AUTO: 141 10(3)UL (ref 150–450)
POTASSIUM SERPL-SCNC: 4 MMOL/L (ref 3.5–5.1)
PROCALCITONIN SERPL-MCNC: <0.04 NG/ML (ref ?–0.05)
RBC # BLD AUTO: 4.58 X10(6)UL
SODIUM SERPL-SCNC: 134 MMOL/L (ref 136–145)
STREP PNEUMO ANTIGEN, URINE: NEGATIVE
WBC # BLD AUTO: 5.3 X10(3) UL (ref 4–11)

## 2024-03-12 PROCEDURE — 87449 NOS EACH ORGANISM AG IA: CPT | Performed by: NURSE PRACTITIONER

## 2024-03-12 PROCEDURE — 84145 PROCALCITONIN (PCT): CPT | Performed by: NURSE PRACTITIONER

## 2024-03-12 PROCEDURE — 85025 COMPLETE CBC W/AUTO DIFF WBC: CPT | Performed by: INTERNAL MEDICINE

## 2024-03-12 PROCEDURE — 80048 BASIC METABOLIC PNL TOTAL CA: CPT | Performed by: INTERNAL MEDICINE

## 2024-03-12 PROCEDURE — 83880 ASSAY OF NATRIURETIC PEPTIDE: CPT | Performed by: NURSE PRACTITIONER

## 2024-03-12 PROCEDURE — 82962 GLUCOSE BLOOD TEST: CPT

## 2024-03-12 RX ORDER — FUROSEMIDE 10 MG/ML
40 INJECTION INTRAMUSCULAR; INTRAVENOUS
Status: DISCONTINUED | OUTPATIENT
Start: 2024-03-12 | End: 2024-03-13

## 2024-03-12 RX ORDER — HYDROCODONE BITARTRATE AND ACETAMINOPHEN 10; 325 MG/1; MG/1
1 TABLET ORAL EVERY 4 HOURS PRN
Status: DISCONTINUED | OUTPATIENT
Start: 2024-03-12 | End: 2024-03-15

## 2024-03-12 RX ORDER — OXYCODONE AND ACETAMINOPHEN 10; 325 MG/1; MG/1
2 TABLET ORAL EVERY 4 HOURS PRN
Status: DISCONTINUED | OUTPATIENT
Start: 2024-03-12 | End: 2024-03-12

## 2024-03-12 NOTE — PROGRESS NOTES
[facility]   Progress Note  -  Deneen Chapa Patient Status:  Inpatient    1965 MRN [id number]   Location [facility] Attending Johny Prince,    Hosp Day # 1 PCP Jenna Gutierrez MD     PCP: Jenna Gutierrez MD      SEE ATTENDING NOTE AT BOTTOM OF PAGE    Is this a shared or split note between Advanced Practice Provider and Physician? Yes    Assessment and Plan:  Deneen Chapa is a 59 year old male with PMH significant for CHF-diastolic, insomnia, migraines, afib, secondary hypercoagulable state, insomnia, HTN, HL, DM- diet controlled, AYANNA, morbid obesity with recent admission to [facility] with  a mechanical fall with B/L Knee Pain  found to have a right tibial plateau fx and possible Left MCL strain who presents from [facility] with cough, sob and fevers found to have metapneumovirus and possible pna,  see below for details      Fever/Cough/SOB 2/2 Metapneumovirus possible PNA   -tmax 100.8. WBC normal. PCT normal   -CXR preliminary with consolidation Left lung   -negative for influenza, covid and RSV  -blood cx NGTD  -RVP + for metapneumovirus   -MRSA screen negative   -not felt to  have pe as pt on xarelto  -IV lasix ordered with LE edema an  -prn nebs and antitussive's  -abx- cefriaxone and zithromax  -on RA    Acute on Chronic CHF, diastolic   Atrial fibrillation with RVR  Secondary Hypercoagulable State  HTN  HL-not on meds  - echo with EF 45-50%  -tele  -CXR not suggestive of CHF but noted LE edema. BNP 17  -continue home meds-asa, Cardizem, Toprol, lisinopril and xarelto   -Hold home oral Lasix, will increase  IV Lasix bid with noted lower extremity edema    Mechanical fall w/ shannon knee pain  Right Tibial Fx  Possible Left MCL Sprain   -work up at [facility] 1)CT of right knee-mildly comminuted and minimally displaced intra-articular fracture along the lateral tibial plateau 2) unable to get MRI's of knees do to wt 3) r/o for vascular disease with bernice  -pain meds-change to norco from  percocet as pt states percocet does not help, may need oxy if needs tylenol for ongoing fevers to not exceed tylenol burden, prn tramadol and flexeril   -per [facility] notes- NWB RLE (can do active and passive ROM in NWB status) and WBAT to LLE, needs to wear immobilizers on right at all times, may remove mobilizers on left leg when in bed  -follow up with Dr Cook,appt 3/18 scheduled     DM Type II  -HgbA1C  7.6  -SSI prn  -accuchecks  -ADA diet     AYANNA  -cpap     Insomnia   -continue home meds-currently only taking trazodone     Migraines  Dizziness   -per med rec no longer taking topiramate, qulipta or eletriptan  -follows with Dr Acevedo-neurology      Morbid Obesity  -PER [facility] notes--he briefly tried metformin, has not tried any new meds or d/w PCP. Encourage a strict change in diet and if possible/qualifies/affords new injectable wt loss meds such as ozempic/wegovy/mounjaro  -recommend follow up with pcp      [facility]  -full code  -POA father      MA/ACO Reach  -Re- Entry: 2/16 discharge from [facility] to [facility]  -Consults: none  -Discharge Needs: return to [facility]  -Appointments: follow up Dr Jenna Gutierrez MD within 1 week of discharge from rehab      [facility] in am  -diet-ADA     Prophy  -SCD  -xarelto      Dispo  -pending clinical course  PCP: Jenna Gutierrez MD        Outpatient records or previous hospital records reviewed.   Further recommendations pending patient's clinical course.  [facility] hospitalist  team to continue to follow patient while in house  Concerns regarding plan of care were discussed with patient. Patient agrees with plan as detailed above. Discussed plan of care with Dr. Prince      Note: This chart was prepared using voice recognition software and may contain unintended word substitution errors.      Susu Cameron RN, NP  [facility] Hospitalist Team   Available via Perfect Serve or Bubble Chat (check Availability)    SUBJECTIVE:   Having fevers still, tmax 101.8 this am. Still complains of cough but  none noted in room. Noted LE edema. Took tramadol 100 mg over 24 hours.       OBJECTIVE:   Blood pressure 139/76, pulse 106, temperature (!) 101.8 °F (38.8 °C), temperature source Oral, resp. rate 16, height 5' 10\" (1.778 m), weight (!) 490 lb 9.6 oz (222.5 kg), SpO2 92%.    GENERAL: no apparent distress, overweight  NEURO: A/A Ox3  RESP: non labored, CTA  CARDIO: irregular   ABD: soft, NT, ND, BS+  EXTREMITIES: pitting le edema     DIAGNOSTIC DATA:   Labs:     Recent Labs   Lab 03/11/24  0633 03/12/24  0521   WBC 5.6 5.3   HGB 14.4 14.5   MCV 93.6 91.3   .0 141.0*       Recent Labs   Lab 03/11/24  0633 03/12/24  0656   * 134*   K 4.1 4.0    105   CO2 26.0 25.0   BUN 13 11   CREATSERUM 0.84 0.74   CA 9.1 9.0   * 121*       No results for input(s): \"ALT\", \"AST\", \"ALB\", \"AMYLASE\", \"LIPASE\", \"LDH\" in the last 168 hours.    Invalid input(s): \"ALPHOS\", \"TBIL\", \"DBIL\", \"TPROT\"    Recent Labs   Lab 03/11/24  1106 03/11/24  1811 03/11/24  2035 03/12/24  0814   PGLU 131* 141* 143* 116*       No results for input(s): \"TROP\" in the last 168 hours.        MEDICATIONS       aspirin  81 mg Oral Daily    dilTIAZem HCl ER Coated Beads  360 mg Oral Daily    lisinopril  5 mg Oral Daily    metoprolol succinate ER  100 mg Oral BID    rivaroxaban  20 mg Oral Daily with food    traZODone  200 mg Oral Nightly    furosemide  40 mg Intravenous Daily    insulin aspart  1-5 Units Subcutaneous TID CC    cefTRIAXone  2 g Intravenous Q24H    azithromycin  500 mg Intravenous Q24H       oxyCODONE-acetaminophen, ondansetron, prochlorperazine, bisacodyl, fleet enema, benzonatate, acetaminophen, cyclobenzaprine, guaiFENesin, polyethylene glycol (PEG 3350), sennosides, traMADol, ipratropium-albuterol, glucose **OR** glucose **OR** glucose-vitamin C **OR** dextrose **OR** glucose **OR** glucose **OR** glucose-vitamin C    Susu Cameron, HILTON      IMAGING     XR CHEST AP PORTABLE  (CPT=71045)    Result Date:  3/11/2024  CONCLUSION:  1. Left perihilar pneumonia.  Follow-up to complete radiographic resolution recommended.    Dictated by (CST): Manuel Portillo MD on 3/11/2024 at 2:06 PM     Finalized by (CST): Manuel Portillo MD on 3/11/2024 at 2:07 PM             SEE ATTENDING NOTE BELOW:   Patient seen and examined. Agree with documentation above    Subjective: Patient doing okay from respiratory standpoint.  He is complaining of his pain regimen not being adequate.  Noted that he did not try his oral Percocet however.  Discussed we can increase doses depending on how the existing regimen works.  She did have a fever of 101.8 this morning.  He is not requiring oxygen    Vitals:    03/12/24 1131   BP:    Pulse:    Resp:    Temp: 98.7 °F (37.1 °C)   Gen: comfortable  Chest: non tender  Lungs: clear  Heart RRR  Legs Trace to +1 edema       Cough, fever  -symptoms appear viral   -human metapneumovirus noted   -on IV antibiotics, will stop most likely after today  -mrsa screen negative     Chronic diastolic CHF  -agree with IV lasix     AYANNA  -use cpap here with sleep     Post operative pain, knee pain  -continue oral regimen as prescribed. Will monitor response     Remainder as stated above in NP note     Johny Prince DO

## 2024-03-12 NOTE — PLAN OF CARE
Problem: CARDIOVASCULAR - ADULT  Goal: Maintains optimal cardiac output and hemodynamic stability  Description: INTERVENTIONS:  - Monitor vital signs, rhythm, and trends  - Monitor for bleeding, hypotension and signs of decreased cardiac output  - Evaluate effectiveness of vasoactive medications to optimize hemodynamic stability  - Monitor arterial and/or venous puncture sites for bleeding and/or hematoma  - Assess quality of pulses, skin color and temperature  - Assess for signs of decreased coronary artery perfusion - ex. Angina  - Evaluate fluid balance, assess for edema, trend weights  Outcome: Progressing     Problem: RESPIRATORY - ADULT  Goal: Achieves optimal ventilation and oxygenation  Description: INTERVENTIONS:  - Assess for changes in respiratory status  - Assess for changes in mentation and behavior  - Position to facilitate oxygenation and minimize respiratory effort  - Oxygen supplementation based on oxygen saturation or ABGs  - Provide Smoking Cessation handout, if applicable  - Encourage broncho-pulmonary hygiene including cough, deep breathe, Incentive Spirometry  - Assess the need for suctioning and perform as needed  - Assess and instruct to report SOB or any respiratory difficulty  - Respiratory Therapy support as indicated  - Manage/alleviate anxiety  - Monitor for signs/symptoms of CO2 retention  Outcome: Progressing     Problem: METABOLIC/FLUID AND ELECTROLYTES - ADULT  Goal: Glucose maintained within prescribed range  Description: INTERVENTIONS:  - Monitor Blood Glucose as ordered  - Assess for signs and symptoms of hyperglycemia and hypoglycemia  - Administer ordered medications to maintain glucose within target range  - Assess barriers to adequate nutritional intake and initiate nutrition consult as needed  - Instruct patient on self management of diabetes  Outcome: Progressing  Goal: Electrolytes maintained within normal limits  Description: INTERVENTIONS:  - Monitor labs and rhythm and  assess patient for signs and symptoms of electrolyte imbalances  - Administer electrolyte replacement as ordered  - Monitor response to electrolyte replacements, including rhythm and repeat lab results as appropriate  - Fluid restriction as ordered  - Instruct patient on fluid and nutrition restrictions as appropriate  Outcome: Progressing  Goal: Hemodynamic stability and optimal renal function maintained  Description: INTERVENTIONS:  - Monitor labs and assess for signs and symptoms of volume excess or deficit  - Monitor intake, output and patient weight  - Monitor urine specific gravity, serum osmolarity and serum sodium as indicated or ordered  - Monitor response to interventions for patient's volume status, including labs, urine output, blood pressure (other measures as available)  - Encourage oral intake as appropriate  - Instruct patient on fluid and nutrition restrictions as appropriate  Outcome: Progressing     Problem: SKIN/TISSUE INTEGRITY - ADULT  Goal: Skin integrity remains intact  Description: INTERVENTIONS  - Assess and document risk factors for pressure ulcer development  - Assess and document skin integrity  - Monitor for areas of redness and/or skin breakdown  - Initiate interventions, skin care algorithm/standards of care as needed  Outcome: Progressing     Patient arrived on unit from ED. On room air at this time. Patient placed on remote telemetry monitor and HR>140, Susu APRN notified and aware and IV diltiazem given per MAR order. Tolerating carb controlled diet.   Safety precautions in place, frequent nursing rounding completed, call light within reach. All questions answered and needs met.

## 2024-03-12 NOTE — PLAN OF CARE
Pt a&o x4, room air, on tele, max assist. Patient reporting pain to knees and back, declined PRN medication. IV antibiotics continue to infuse as ordered. Patient spiked a fever this morning, tylenol given with relief. Plan is to continue antibiotics. Safety precautions maintained. Call light in reach.     Problem: Patient Centered Care  Goal: Patient preferences are identified and integrated in the patient's plan of care  Description: Interventions:  - What would you like us to know as we care for you? From [facility]  - Provide timely, complete, and accurate information to patient/family  - Incorporate patient and family knowledge, values, beliefs, and cultural backgrounds into the planning and delivery of care  - Encourage patient/family to participate in care and decision-making at the level they choose  - Honor patient and family perspectives and choices  Outcome: Progressing     Problem: Patient/Family Goals  Goal: Patient/Family Long Term Goal  Description: Patient's Long Term Goal: discharge    Interventions:  - Monitor vital signs, labs, test results  - Monitor blood glucose levels  - Oxygen and respiratory therapy as needed  - Pain management  - Follow MD orders  - Administer medications per MD order  - Diagnostics per order  - Update /  inform patient on plan of care  - Discharge planning  - See additional Care Plan goals for specific interventions  Outcome: Progressing  Goal: Patient/Family Short Term Goal  Description: Patient's Short Term Goal: to breath better and resolve cough    Interventions:   - Monitor vital signs, labs, test results  - Oxygen and respiratory therapy as needed  - Follow MD orders  - Administer medications per MD order    - See additional Care Plan goals for specific interventions  Outcome: Progressing     Problem: CARDIOVASCULAR - ADULT  Goal: Maintains optimal cardiac output and hemodynamic stability  Description: INTERVENTIONS:  - Monitor vital signs, rhythm, and  trends  - Monitor for bleeding, hypotension and signs of decreased cardiac output  - Evaluate effectiveness of vasoactive medications to optimize hemodynamic stability  - Monitor arterial and/or venous puncture sites for bleeding and/or hematoma  - Assess quality of pulses, skin color and temperature  - Assess for signs of decreased coronary artery perfusion - ex. Angina  - Evaluate fluid balance, assess for edema, trend weights  Outcome: Progressing  Goal: Absence of cardiac arrhythmias or at baseline  Description: INTERVENTIONS:  - Continuous cardiac monitoring, monitor vital signs, obtain 12 lead EKG if indicated  - Evaluate effectiveness of antiarrhythmic and heart rate control medications as ordered  - Initiate emergency measures for life threatening arrhythmias  - Monitor electrolytes and administer replacement therapy as ordered  Outcome: Progressing     Problem: RESPIRATORY - ADULT  Goal: Achieves optimal ventilation and oxygenation  Description: INTERVENTIONS:  - Assess for changes in respiratory status  - Assess for changes in mentation and behavior  - Position to facilitate oxygenation and minimize respiratory effort  - Oxygen supplementation based on oxygen saturation or ABGs  - Provide Smoking Cessation handout, if applicable  - Encourage broncho-pulmonary hygiene including cough, deep breathe, Incentive Spirometry  - Assess the need for suctioning and perform as needed  - Assess and instruct to report SOB or any respiratory difficulty  - Respiratory Therapy support as indicated  - Manage/alleviate anxiety  - Monitor for signs/symptoms of CO2 retention  Outcome: Progressing     Problem: METABOLIC/FLUID AND ELECTROLYTES - ADULT  Goal: Glucose maintained within prescribed range  Description: INTERVENTIONS:  - Monitor Blood Glucose as ordered  - Assess for signs and symptoms of hyperglycemia and hypoglycemia  - Administer ordered medications to maintain glucose within target range  - Assess barriers to  adequate nutritional intake and initiate nutrition consult as needed  - Instruct patient on self management of diabetes  Outcome: Progressing  Goal: Electrolytes maintained within normal limits  Description: INTERVENTIONS:  - Monitor labs and rhythm and assess patient for signs and symptoms of electrolyte imbalances  - Administer electrolyte replacement as ordered  - Monitor response to electrolyte replacements, including rhythm and repeat lab results as appropriate  - Fluid restriction as ordered  - Instruct patient on fluid and nutrition restrictions as appropriate  Outcome: Progressing  Goal: Hemodynamic stability and optimal renal function maintained  Description: INTERVENTIONS:  - Monitor labs and assess for signs and symptoms of volume excess or deficit  - Monitor intake, output and patient weight  - Monitor urine specific gravity, serum osmolarity and serum sodium as indicated or ordered  - Monitor response to interventions for patient's volume status, including labs, urine output, blood pressure (other measures as available)  - Encourage oral intake as appropriate  - Instruct patient on fluid and nutrition restrictions as appropriate  Outcome: Progressing     Problem: SKIN/TISSUE INTEGRITY - ADULT  Goal: Skin integrity remains intact  Description: INTERVENTIONS  - Assess and document risk factors for pressure ulcer development  - Assess and document skin integrity  - Monitor for areas of redness and/or skin breakdown  - Initiate interventions, skin care algorithm/standards of care as needed  Outcome: Progressing     Problem: Impaired Functional Mobility  Goal: Achieve highest/safest level of mobility/gait  Description: Interventions:  - Assess patient's functional ability and stability  - Promote increasing activity/tolerance for mobility and gait  - Educate and engage patient/family in tolerated activity level and precautions  - Recommend use of chair position in bed 3 times per day  Outcome: Progressing

## 2024-03-12 NOTE — CM/SW NOTE
[facility] received phone call from [facility].    Per [facility], [facility] does [facility] to [facility] a [facility].  Bess explained pt does want to go home after more therapy.    Per Erika from [facility] (pt's previous [facility]) [employer] stopped authorizing for therapy.    Per SRUTHI Acuña, possible discharge tomorrow.    Per Erika, pt's size may be a barrier for [facility] approval.    Barely any MD notes/no therapy evaluation sin the EMR-  unable to submit for auth at this time.  Therapy signed into case.    [facility] to email  list of [facility] contracted facilities.     [facility] direct line:    [phone]    PLAN: DC to [facility] pending auth/choice    / to remain available for support and/or discharge planning.     Marivel Padron MSW, LSW [phone]

## 2024-03-12 NOTE — PLAN OF CARE
Problem: Patient Centered Care  Goal: Patient preferences are identified and integrated in the patient's plan of care  Description: Interventions:  - What would you like us to know as we care for you? From [facility]  - Provide timely, complete, and accurate information to patient/family  - Incorporate patient and family knowledge, values, beliefs, and cultural backgrounds into the planning and delivery of care  - Encourage patient/family to participate in care and decision-making at the level they choose  - Honor patient and family perspectives and choices  Outcome: Progressing     Problem: Patient/Family Goals  Goal: Patient/Family Long Term Goal  Description: Patient's Long Term Goal: discharge    Interventions:  - Monitor vital signs, labs, test results  - Monitor blood glucose levels  - Oxygen and respiratory therapy as needed  - Pain management  - Follow MD orders  - Administer medications per MD order  - Diagnostics per order  - Update /  inform patient on plan of care  - Discharge planning  - See additional Care Plan goals for specific interventions  Outcome: Progressing  Goal: Patient/Family Short Term Goal  Description: Patient's Short Term Goal: to breath better and resolve cough    Interventions:   - Monitor vital signs, labs, test results  - Oxygen and respiratory therapy as needed  - Follow MD orders  - Administer medications per MD order    - See additional Care Plan goals for specific interventions  Outcome: Progressing     Problem: CARDIOVASCULAR - ADULT  Goal: Maintains optimal cardiac output and hemodynamic stability  Description: INTERVENTIONS:  - Monitor vital signs, rhythm, and trends  - Monitor for bleeding, hypotension and signs of decreased cardiac output  - Evaluate effectiveness of vasoactive medications to optimize hemodynamic stability  - Monitor arterial and/or venous puncture sites for bleeding and/or hematoma  - Assess quality of pulses, skin color and temperature  -  Assess for signs of decreased coronary artery perfusion - ex. Angina  - Evaluate fluid balance, assess for edema, trend weights  Outcome: Progressing  Goal: Absence of cardiac arrhythmias or at baseline  Description: INTERVENTIONS:  - Continuous cardiac monitoring, monitor vital signs, obtain 12 lead EKG if indicated  - Evaluate effectiveness of antiarrhythmic and heart rate control medications as ordered  - Initiate emergency measures for life threatening arrhythmias  - Monitor electrolytes and administer replacement therapy as ordered  Outcome: Progressing     Problem: RESPIRATORY - ADULT  Goal: Achieves optimal ventilation and oxygenation  Description: INTERVENTIONS:  - Assess for changes in respiratory status  - Assess for changes in mentation and behavior  - Position to facilitate oxygenation and minimize respiratory effort  - Oxygen supplementation based on oxygen saturation or ABGs  - Provide Smoking Cessation handout, if applicable  - Encourage broncho-pulmonary hygiene including cough, deep breathe, Incentive Spirometry  - Assess the need for suctioning and perform as needed  - Assess and instruct to report SOB or any respiratory difficulty  - Respiratory Therapy support as indicated  - Manage/alleviate anxiety  - Monitor for signs/symptoms of CO2 retention  Outcome: Progressing     Problem: METABOLIC/FLUID AND ELECTROLYTES - ADULT  Goal: Glucose maintained within prescribed range  Description: INTERVENTIONS:  - Monitor Blood Glucose as ordered  - Assess for signs and symptoms of hyperglycemia and hypoglycemia  - Administer ordered medications to maintain glucose within target range  - Assess barriers to adequate nutritional intake and initiate nutrition consult as needed  - Instruct patient on self management of diabetes  Outcome: Progressing  Goal: Electrolytes maintained within normal limits  Description: INTERVENTIONS:  - Monitor labs and rhythm and assess patient for signs and symptoms of electrolyte  imbalances  - Administer electrolyte replacement as ordered  - Monitor response to electrolyte replacements, including rhythm and repeat lab results as appropriate  - Fluid restriction as ordered  - Instruct patient on fluid and nutrition restrictions as appropriate  Outcome: Progressing  Goal: Hemodynamic stability and optimal renal function maintained  Description: INTERVENTIONS:  - Monitor labs and assess for signs and symptoms of volume excess or deficit  - Monitor intake, output and patient weight  - Monitor urine specific gravity, serum osmolarity and serum sodium as indicated or ordered  - Monitor response to interventions for patient's volume status, including labs, urine output, blood pressure (other measures as available)  - Encourage oral intake as appropriate  - Instruct patient on fluid and nutrition restrictions as appropriate  Outcome: Progressing     Problem: SKIN/TISSUE INTEGRITY - ADULT  Goal: Skin integrity remains intact  Description: INTERVENTIONS  - Assess and document risk factors for pressure ulcer development  - Assess and document skin integrity  - Monitor for areas of redness and/or skin breakdown  - Initiate interventions, skin care algorithm/standards of care as needed  Outcome: Progressing     Problem: Impaired Functional Mobility  Goal: Achieve highest/safest level of mobility/gait  Description: Interventions:  - Assess patient's functional ability and stability  - Promote increasing activity/tolerance for mobility and gait  - Educate and engage patient/family in tolerated activity level and precautions  - Recommend use of chair position in bed 3 times per day  Outcome: Progressing     Monitoring vital signs, stable at this moment. Monitoring blood glucose levels. Pain medication provided as needed. Call light within reach, bed locked in lowest position, all fall precautions in place. All needs addressed. Frequent rounding maintained by nursing staff. Patient completing IV antibiotics  and diuretics per MD order, had large BM, no acute changes at this time.

## 2024-03-12 NOTE — PHYSICAL THERAPY NOTE
From Dr. Cao via Epic chat \"Definitely needs to maintain RLE NWB and knee immobilizer. Left knee can progress WB and if more stable could progress out of the knee immobilizer at this time. If still unstable when up and on the extremity should continue in the immobilizer. \" PT OT working on getting knee immobilizers for patient.

## 2024-03-12 NOTE — PHYSICAL THERAPY NOTE
Chart reviewed, patient has been getting up without maintaining NWB and has not been wearing knee immobilizers. Explained to patient physical therapy order states that he needs to be NWB on right leg and WBAT in left with knee immobilizers (confirmed via ortho MD via Epic chat). Reached out to ortho MD to confirm, see prior note from this author. Message out to social work to see if we can coordinate having knee immobilizers brought to hospital. Patient understands why therapy not able to work with him at this time, agreeable to therapist reaching out to ortho MD and explaining the situation. Will follow up with patient tomorrow pending if knee immobilizers have arrived.

## 2024-03-12 NOTE — PROGRESS NOTES
OT orders received and chart reviewed. RN approving of pt participation in therapy. Contact coordinated w/ PT. Pt received in bed w/o immobilizers and reported that he has not been wearing them at rehab. Immobilizers currently unavailable for use w/ therapy. Dr Cao contacted for clarification. Per MD pt \"Definitely needs to maintain RLE NWB and knee immobilizer. Left knee can progress WB and if more stable could progress out of the knee immobilizer at this time. If still unstable when up and on the extremity should continue in the immobilizer\".  Pt notified of MD orders. Attempts currently being made to have immobilizers available at [place]. OT eval deferred pending immobilizers available for oob activity

## 2024-03-13 LAB
ANION GAP SERPL CALC-SCNC: 8 MMOL/L (ref 0–18)
BASOPHILS # BLD AUTO: 0.05 X10(3) UL (ref 0–0.2)
BASOPHILS NFR BLD AUTO: 1 %
BUN BLD-MCNC: 13 MG/DL (ref 9–23)
BUN/CREAT SERPL: 17.6 (ref 10–20)
CALCIUM BLD-MCNC: 8.7 MG/DL (ref 8.7–10.4)
CHLORIDE SERPL-SCNC: 105 MMOL/L (ref 98–112)
CO2 SERPL-SCNC: 26 MMOL/L (ref 21–32)
CREAT BLD-MCNC: 0.74 MG/DL
DEPRECATED RDW RBC AUTO: 43.8 FL (ref 35.1–46.3)
EGFRCR SERPLBLD CKD-EPI 2021: 104 ML/MIN/1.73M2 (ref 60–?)
EOSINOPHIL # BLD AUTO: 0.14 X10(3) UL (ref 0–0.7)
EOSINOPHIL NFR BLD AUTO: 2.9 %
ERYTHROCYTE [DISTWIDTH] IN BLOOD BY AUTOMATED COUNT: 13.3 % (ref 11–15)
GLUCOSE BLD-MCNC: 114 MG/DL (ref 70–99)
GLUCOSE BLDC GLUCOMTR-MCNC: 108 MG/DL (ref 70–99)
GLUCOSE BLDC GLUCOMTR-MCNC: 113 MG/DL (ref 70–99)
GLUCOSE BLDC GLUCOMTR-MCNC: 118 MG/DL (ref 70–99)
GLUCOSE BLDC GLUCOMTR-MCNC: 123 MG/DL (ref 70–99)
HCT VFR BLD AUTO: 42.6 %
HGB BLD-MCNC: 14.6 G/DL
IMM GRANULOCYTES # BLD AUTO: 0.03 X10(3) UL (ref 0–1)
IMM GRANULOCYTES NFR BLD: 0.6 %
LYMPHOCYTES # BLD AUTO: 1.78 X10(3) UL (ref 1–4)
LYMPHOCYTES NFR BLD AUTO: 36.7 %
MCH RBC QN AUTO: 30.4 PG (ref 26–34)
MCHC RBC AUTO-ENTMCNC: 34.3 G/DL (ref 31–37)
MCV RBC AUTO: 88.6 FL
MONOCYTES # BLD AUTO: 0.81 X10(3) UL (ref 0.1–1)
MONOCYTES NFR BLD AUTO: 16.7 %
NEUTROPHILS # BLD AUTO: 2.04 X10 (3) UL (ref 1.5–7.7)
NEUTROPHILS # BLD AUTO: 2.04 X10(3) UL (ref 1.5–7.7)
NEUTROPHILS NFR BLD AUTO: 42.1 %
OSMOLALITY SERPL CALC.SUM OF ELEC: 289 MOSM/KG (ref 275–295)
PLATELET # BLD AUTO: 141 10(3)UL (ref 150–450)
POTASSIUM SERPL-SCNC: 3.6 MMOL/L (ref 3.5–5.1)
POTASSIUM SERPL-SCNC: 3.9 MMOL/L (ref 3.5–5.1)
RBC # BLD AUTO: 4.81 X10(6)UL
SODIUM SERPL-SCNC: 139 MMOL/L (ref 136–145)
WBC # BLD AUTO: 4.9 X10(3) UL (ref 4–11)

## 2024-03-13 PROCEDURE — 97162 PT EVAL MOD COMPLEX 30 MIN: CPT

## 2024-03-13 PROCEDURE — 82962 GLUCOSE BLOOD TEST: CPT

## 2024-03-13 PROCEDURE — 97530 THERAPEUTIC ACTIVITIES: CPT

## 2024-03-13 PROCEDURE — 84132 ASSAY OF SERUM POTASSIUM: CPT | Performed by: INTERNAL MEDICINE

## 2024-03-13 PROCEDURE — 85025 COMPLETE CBC W/AUTO DIFF WBC: CPT | Performed by: NURSE PRACTITIONER

## 2024-03-13 PROCEDURE — 80048 BASIC METABOLIC PNL TOTAL CA: CPT | Performed by: NURSE PRACTITIONER

## 2024-03-13 PROCEDURE — 97166 OT EVAL MOD COMPLEX 45 MIN: CPT

## 2024-03-13 RX ORDER — CEFUROXIME AXETIL 500 MG/1
500 TABLET ORAL EVERY 12 HOURS SCHEDULED
Status: DISCONTINUED | OUTPATIENT
Start: 2024-03-14 | End: 2024-03-15

## 2024-03-13 RX ORDER — DILTIAZEM HYDROCHLORIDE 120 MG/1
120 CAPSULE, EXTENDED RELEASE ORAL ONCE
Status: COMPLETED | OUTPATIENT
Start: 2024-03-13 | End: 2024-03-13

## 2024-03-13 RX ORDER — DILTIAZEM HYDROCHLORIDE 60 MG/1
120 TABLET, FILM COATED ORAL EVERY 6 HOURS SCHEDULED
Status: DISCONTINUED | OUTPATIENT
Start: 2024-03-14 | End: 2024-03-15

## 2024-03-13 RX ORDER — POTASSIUM CHLORIDE 20 MEQ/1
40 TABLET, EXTENDED RELEASE ORAL EVERY 4 HOURS
Status: COMPLETED | OUTPATIENT
Start: 2024-03-13 | End: 2024-03-13

## 2024-03-13 RX ORDER — DILTIAZEM HYDROCHLORIDE 120 MG/1
240 CAPSULE, EXTENDED RELEASE ORAL 2 TIMES DAILY
Status: DISCONTINUED | OUTPATIENT
Start: 2024-03-14 | End: 2024-03-13

## 2024-03-13 RX ORDER — DIGOXIN 125 MCG
125 TABLET ORAL DAILY
Status: DISCONTINUED | OUTPATIENT
Start: 2024-03-13 | End: 2024-03-15

## 2024-03-13 RX ORDER — FUROSEMIDE 40 MG/1
40 TABLET ORAL DAILY
Status: DISCONTINUED | OUTPATIENT
Start: 2024-03-14 | End: 2024-03-15

## 2024-03-13 NOTE — PAYOR COMM NOTE
--------------  ADMISSION REVIEW   3/11-3/12  Payor: [employer] PPO  Subscriber #:  [id number]  Authorization Number: [id number]    Admit date: 3/11/24  Admit time: 10:36 AM       REVIEW DOCUMENTATION:    ED Provider Notes signed by Martin Landaverde MD at 3/12/2024  8:08 AM      Patient Seen in: [facility] Emergency Department    History     Chief Complaint   Patient presents with    Cough     Stated Complaint: cough    Subjective:   HPI    59-year-old male recently admitted to [facility] for fractured knee living at [facility] requiring assistance with shortness of breath and cough for few days.  No reported history of underlying lung disease.  Positive cardiac history.  no recent reported antibiotics.  No known fever.  Given a neb before I saw him in the room.      Objective:   Past Medical History:   Diagnosis Date    Alcohol abuse     last drink Jan 2020    Anxiety     [facility]: Dr. Denny    Arrhythmia     Atrial fibrillation (HCC)     EP cards: Dr. Sumeet Colon, PAroxysmal AFIB    Calculus of kidney     Congestive heart failure (CHF) (HCC)     Depression     [facility] psych: Dr. Denny--currently on wellbutrin as of 3/28/22, tried zoloft and didn't help    High blood pressure     Hyperlipidemia     Morbid obesity (HCC)     Obstructive sleep apnea     Prediabetes     Sleep apnea     Vertigo     Dr. Kallie Acevedo----Mirtazepine     Positive for stated complaint: cough  Other systems are as noted in HPI.  Constitutional and vital signs reviewed.      All other systems reviewed and negative except as noted above.    Physical Exam     ED Triage Vitals   BP 03/11/24 0308 (!) 116/91   Pulse 03/11/24 0308 88   Resp 03/11/24 0308 20   Temp 03/11/24 0308 99.4 °F (37.4 °C)   Temp src 03/11/24 0822 Temporal   SpO2 03/11/24 0308 91 %   O2 Device 03/11/24 0308 None (Room air)       Current:/67 (BP Location: Right arm)   Pulse 92   Temp 99.5 °F (37.5 °C) (Oral)   Resp 18   Ht 177.8 cm (5' 10\")   Wt  (!) 222.5 kg   SpO2 97%   BMI 70.39 kg/m²     Constitutional: Oriented to person, place, and time.  Appears well-developed. No distress.  Morbidly obese  Head: Normocephalic and atraumatic.   Eyes: Conjunctivae are normal. Pupils are equal, round, and reactive to light.   Neck: Normal range of motion. Neck supple.   Cardiovascular: Normal rate, regular rhythm and intact distal pulses.    Pulmonary/Chest: Effort normal. No respiratory distress.  Diminished lung sounds and wheeze bilaterally.  Abdominal: Soft. There is no tenderness. There is no guarding.   Musculoskeletal: Normal range of motion. No edema or tenderness.   Neurological: Alert and oriented to person, place, and time.   Skin: Skin is warm and dry.   Psychiatric: Normal mood and affect.  Behavior is normal.   Nursing note and vitals reviewed.    Differential diagnosis includes acute viral syndrome, acute bronchospasm, bronchopneumonia, pleural effusion, dehydration, renal insufficiency.      ED Course     Labs Reviewed   BASIC METABOLIC PANEL (8) - Abnormal; Notable for the following components:       Result Value    Glucose 122 (*)     Sodium 135 (*)     All other components within normal limits   HEMOGLOBIN A1C - Abnormal; Notable for the following components:    HgbA1C 7.6 (*)     Estimated Average Glucose 171 (*)     All other components within normal limits   BASIC METABOLIC PANEL (8) - Abnormal; Notable for the following components:    Glucose 121 (*)     Sodium 134 (*)     All other components within normal limits   POCT GLUCOSE - Abnormal; Notable for the following components:    POC Glucose  131 (*)     All other components within normal limits   POCT GLUCOSE - Abnormal; Notable for the following components:    POC Glucose  141 (*)     All other components within normal limits   POCT GLUCOSE - Abnormal; Notable for the following components:    POC Glucose  143 (*)     All other components within normal limits   RESPIRATORY FLU EXPAND PANEL +  COVID-19 - Abnormal; Notable for the following components:    Metapneumovirus PCR: Detected (*)     All other components within normal limits    Narrative:     This test is intended for the simultaneous qualitative detection and differentiation of nucleic acids from multiple viral and bacterial respiratory organisms, including nucleic acid from Severe Acute Respiratory Syndrome Coronavirus 2 (SARS-CoV-2) in nasopharyngeal swab from individuals suspected of respiratory viral infection consistent with COVID-19 by their healthcare provider.    Test performed using the [employer] Respiratory Panel 2.1 (RP2.1) assay on the [employer] System, [employer], [employer], [place] [address].    This test is being used under the Food and Drug Administration's Emergency Use Authorization.    The authorized Fact Sheet for Healthcare Providers for this assay is available upon request from the laboratory.    SARS and MERS coronaviruses are not tested on this assay.   CBC W/ DIFFERENTIAL - Abnormal; Notable for the following components:    RDW-SD 46.6 (*)     Lymphocyte Absolute 0.82 (*)     All other components within normal limits   CBC W/ DIFFERENTIAL - Abnormal; Notable for the following components:    .0 (*)     All other components within normal limits   PROCALCITONIN - Normal   BNP (B TYPE NATRIURETIC PEPTIDE) - Normal   SARS-COV-2/FLU A AND B/RSV BY PCR (GENEXPERT) - Normal    Narrative:     This test is intended for the qualitative detection and differentiation of SARS-CoV-2, influenza A, influenza B, and respiratory syncytial virus (RSV) viral RNA in nasopharyngeal or nares swabs from individuals suspected of respiratory viral infection consistent with COVID-19 by their healthcare provider. Signs and symptoms of respiratory viral infection due to SARS-CoV-2, influenza, and RSV can be similar.    Test performed using the Xpert Xpress SARS-CoV-2/FLU/RSV (real time RT-PCR)  assay on the [employer] instrument,  [employer], [place] [address].   This test is being used under the Food and Drug Administration's Emergency Use Authorization.    The authorized Fact Sheet for Healthcare Providers for this assay is available upon request from the laboratory.   MRSA SCREEN BY PCR - Normal   CBC WITH DIFFERENTIAL WITH PLATELET    Narrative:     The following orders were created for panel order CBC With Differential With Platelet.  Procedure                               Abnormality         Status                     ---------                               -----------         ------                     CBC W/ DIFFERENTIAL[[id number]]          Abnormal            Final result                 Please view results for these tests on the individual orders.   CBC WITH DIFFERENTIAL WITH PLATELET    Narrative:     The following orders were created for panel order CBC With Differential With Platelet.  Procedure                               Abnormality         Status                     ---------                               -----------         ------                     CBC W/ DIFFERENTIAL[[id number]]          Abnormal            Final result                 Please view results for these tests on the individual orders.   BLOOD CULTURE    Narrative:     Anaerobic Bottle Volume - 7 mL  Aerobic Bottle Volume - 7 mL   BLOOD CULTURE        XR CHEST AP PORTABLE  (CPT=71045)    Result Date: 3/11/2024    CONCLUSION:  1. Left perihilar pneumonia.  Follow-up to complete radiographic resolution recommended.    Dictated by (CST): Manuel Portillo MD on 3/11/2024 at 2:06 PM     Finalized by (CST): Manuel Portillo MD on 3/11/2024 at 2:07 PM            [facility]        Admission disposition: 3/11/2024  7:09 AM    Medical Decision Making  X-ray reading concerning for left perihilar pneumonia.  Borderline hypoxic here.  Given 1 neb as stated.  Will do broad-spectrum antibiotic coverage given patient is at a senior living facility and was recently admitted.  I spoke with  the [facility] hospitalist.  They are agreeable to admission.    Problems Addressed:  Bronchospasm: acute illness or injury  Hospital-acquired pneumonia: acute illness or injury with systemic symptoms that poses a threat to life or bodily functions    Amount and/or Complexity of Data Reviewed  External Data Reviewed: notes.     Details: [facility] admit    Admit date: 2/10/2024     Discharge date and time: 2/16/2024  5:30 PM      Attending Physician: No att. providers found      Consults: IP CONSULT TO CASE MANAGEMENT  IP CONSULT TO ORTHOPEDIC SURGERY     Primary Care Physician: Jenna Gutierrez MD      Reason for admission: fall     Risk For Readmission: mod     Discharge Diagnoses: Inability to ambulate due to knee [R26.2]  Contusion of knee, unspecified laterality, initial encounter [S80.00XA]  See Additional Discharge Diagnoses in Hospital Course     Discharged Condition: stable     Follow-up with labs/images appointments: follow up with ortho     Exam  Gen: No acute distress  Pulm: Lungs clear, normal respiratory effort  CV: Heart with regular rate and rhythm  Abd: Abdomen soft,         HPI:      Hospital Course:      Deneen Chapa is a 58 year old male with PMH significant for CHF, depression/anxiety, HTN, DL, AYANNA, morbid obesity presents to the ED after a mechanical fall.  Patient slipped on now complaining of bilateral knee pain.  Patient was seen and evaluated by orthopedic surgery.  Initial CT scan showed mildly comminuted and minimally displaced intra-articular fracture along the lateral tibial plateau.  MRI was recommended by orthopedic surgery for further evaluation of patient's right knee however due to patient's weight, unable to get MRI despite multiple attempts.  See notes for further details.  Patient underwent vascular study to ensure intact vasculature per orthopedic surgery.  At this time, patient was allowed to be placed in a knee brace and work with physical therapy.  He continued to improve.  Pain  management was also a challenge for the patient during his course.  Eventually patient was able to tolerate p.o. pain medications and have mobility in his knees to participate in rehab's.  Patient was cleared for discharge with outpatient follow-up with orthopedic surgery.  Cleared by orthopedic surgery for discharge.  Patient to DC to rehab.  Details below        Mechanical fall w/ shannon knee pain  Pain management  -no fractures or dislocation on xray  -Increased pain management regimen to Oxy IR-discussed concerns for respiratory depression given his size and presence of AYANNA.  -supportive measure  -PT/OT assessment pending  -CT of right knee-mildly comminuted and minimally displaced intra-articular fracture along the lateral tibial plateau  - MRI of both knees ordered given significant pain however due to patient's weight.  Earlier discussions suggested that patient's weight would be prohibitive for MRI at [facility].  Transfer process was initiated however now the weight was recalculated and was acceptable for the MRI.  Unfortunately, patient was not able to get the MRI due to size restrictions.  - Discussed case with transfer center again, unable to transfer patient elsewhere, limited by weight for external organizations as well at this time.  - Discussed case again with -will plan for [facility]ASZ to further assess compromise to any vasculature at this time        Paroxysmal A-fib  - Continue diltiazem, Xarelto     Morbid obesity  -has not tried any new meds or d/w PCP   -would encourage a strict change in diet and if possible/qualifies/affords new injectable wt loss meds such as ozempic/wegovy/mounjaro  - Recommend outpatient obesity clinic referral     HTN  -resume home meds     DL  -statin     AYANNA  -cpap      Depression/anxiety  -resume home meds           Operative Procedures:       Imaging: No results found.        Disposition: SNF    Labs: ordered. Decision-making details documented in ED  Course.  Radiology: ordered and independent interpretation performed. Decision-making details documented in ED Course.     Details: My gross review of the chest x-ray there appears to be evidence of a possible left perihilar infiltrate or effusion at the left base    Risk  Drug therapy requiring intensive monitoring for toxicity.  Decision regarding hospitalization.        Disposition and Plan     Clinical Impression:  1. Hospital-acquired pneumonia    2. Bronchospasm         Disposition:  Admit  3/11/2024  7:09 am    Hospital Problems       Present on Admission  Date Reviewed: 12/14/2021            ICD-10-CM Noted POA    * (Principal) Hospital-acquired pneumonia J18.9, Y95 3/11/2024 Unknown    Bronchospasm J98.01 3/11/2024 Unknown                   H&P  ASSESSMENT / PLAN:   Deneen Chapa is a 59 year old male with PMH significant for CHF-diastolic, insomnia, migraines, afib, secondary hypercoagulable state, insomnia, HTN, HL, DM- diet controlled, AYANNA, morbid obesity with recent admission to [facility] with  a mechanical fall with B/L Knee Pain  found to have a right tibial plateau fx and possible Left MCL strain who presents from [facility] with cough, sob and fevers found to have metapneumovirus and possible pna,  see below for details      Fever/Cough/SOB 2/2 Metapneumovirus possible PNA   -tmax 100.8. WBC normal. PCT normal   -CXR preliminary with consolidation Left lung   -negative for influenza, covid and RSV  -bloood cx pending  -RVP + for metapneumovirus   -MRSA screen ordered   -not felt to  have pe as pt on xarelto  -IV lasix ordered with LE edema and missing lasix doses, bnp pending  -abx- cefepime and vanco  -on RA    Mechanical fall w/ shannon knee pain  Right Tibial Fx  Possible Left MCL Sprain   -work up at [facility] 1)CT of right knee-mildly comminuted and minimally displaced intra-articular fracture along the lateral tibial plateau 2) unable to get MRI's of knees do to wt 3) r/o for vascular disease with  bernice  -pain meds-oxy, flexeril and tramadol  -per [facility] notes- NWB RLE (can do active and passive ROM in NWB status) and WBAT to LLE, needs to wear immobilizers on right at all times, may remove mobilizers on left leg when in bed  -follow up with Dr Cook     DM Type II  -HgbA1C 6/1/2023 6.7, repeat pending  -SSI prn  -accuchecks  -ADA diet      Atrial fibrillation with RVR  Secondary Hypercoagulable State  HTN  HL-not on meds  Chronic CHF, diastolic   -2023 echo with EF 45-50%  -tele  -CXR not suggestive of CHF but noted LE edema. BNP pending   -s/p iv cardizem 15 mg iv in ER  -resume home meds-asa, Cardizem, Toprol, lisinopril and xarelto   -Hold home Lasix and give IV Lasix with noted lower extremity edema    AYANNA  -cpap     Insomnia   -continue home meds-currently only taking trazodone     Migraines  Dizziness   -per med rec no longer taking topiramate, qulipta or eletriptan  -follows with Dr Acevedo-neurology      Morbid Obesity  -PER [facility] notes--he briefly tried metformin, has not tried any new meds or d/w PCP. Encourage a strict change in diet and if possible/qualifies/affords new injectable wt loss meds such as ozempic/wegovy/mounjaro  -recommend follow up with pcp      [facility]  -full code  -POA father      MA/ACO Reach  -Re- Entry: 2/16 discharge from [facility] to [facility]  -Consults: none  -Discharge Needs: return to [facility]  -Appointments: follow up Dr Jenna Gutierrez MD within 1 week of discharge from rehab      [facility]  [facility]ADA     Prophy  -SCD  -xarelto      Dispo  -pending clinical course    Deneen Chapa is a 59  year old male with PMH significant for CHF-diastolic, insomnia, migraines, afib, secondary hypercoagulable state, insomnia, HTN, HL, DM- diet controlled, AYANNA, morbid obesity with recent admission to [facility] with  a mechanical fall with B/L Knee Pain  found to have a right tibial plateau fx and possible Left MCL strain     History obtained from patient in the emergency room.  He states that he has been  progressing in therapy at [facility] from a mobility perspective and has been there about 3 weeks.  About 4 days ago he noted nonproductive cough and fever of ~99.  He states he is having difficulty breathing.  He does have a history of sleep apnea and does use CPAP.  He also has a history of congestive heart failure related to atrial fibrillation and states he has refused a few doses of his water pills however the dosages are higher than he normally takes.  Upon seeing patient in the emergency room he he has issues with coughing.  He is on room air.  He denied any sore throat sinus pain or ear pain.  He states that he has not been around anybody that he knows that is actively sick other than the staff at the rehab center. CXR suggestive of PNA.  He has noted to be in A-fib with heart rate in the low 100s after getting 15 mg of IV push Cardizem.  He does have he states he continues to have pain in his knees and that the pain medication is not helping however they will not increase the dosages.    SEE ATTENDING NOTE BELOW    Patient seen and examined.  Agree with documentation as stated above     59-year-old male with multiple past medical conditions, with a recent admission to [facility] presents to the hospital for evaluation of cough, shortness of breath, fever.         Vitals:     03/11/24 1041   BP: 111/86   Pulse: 79   Resp: 24   Temp: 99.8 °F (37.7 °C)   Gen: comfortable  Chest: non tender  Lungs: clear  Heart RRR  Legs Trace to +1 edema      A/P     Cough, fever  -symptoms appear viral in nature  -human metapneumovirus noted   -on IV antibiotics.   -if mrsa screen negative can stop vancomycin  -will narrow antibiotics appropriately pending progress  -await urine culture      Chronic diastolic CHF  -agree with one dose of IV lasix today     AYANNA  -use cpap here with sleep      Remainder as stated above in NP note               3/12 IM  Deneen Chapa is a 59 year old male with PMH significant for CHF-diastolic, insomnia,  migraines, afib, secondary hypercoagulable state, insomnia, HTN, HL, DM- diet controlled, AYANNA, morbid obesity with recent admission to [facility] with  a mechanical fall with B/L Knee Pain  found to have a right tibial plateau fx and possible Left MCL strain who presents from [facility] with cough, sob and fevers found to have metapneumovirus and possible pna,  see below for details      Fever/Cough/SOB 2/2 Metapneumovirus possible PNA   -tmax 100.8. WBC normal. PCT normal   -CXR preliminary with consolidation Left lung   -negative for influenza, covid and RSV  -blood cx NGTD  -RVP + for metapneumovirus   -MRSA screen negative   -not felt to  have pe as pt on xarelto  -IV lasix ordered with LE edema an  -prn nebs and antitussive's  -abx- cefriaxone and zithromax  -on RA     Acute on Chronic CHF, diastolic   Atrial fibrillation with RVR  Secondary Hypercoagulable State  HTN  HL-not on meds  -2023 echo with EF 45-50%  -tele  -CXR not suggestive of CHF but noted LE edema. BNP 17  -continue home meds-asa, Cardizem, Toprol, lisinopril and xarelto   -Hold home oral Lasix, will increase  IV Lasix bid with noted lower extremity edema    Mechanical fall w/ shannon knee pain  Right Tibial Fx  Possible Left MCL Sprain   -work up at [facility] 1)CT of right knee-mildly comminuted and minimally displaced intra-articular fracture along the lateral tibial plateau 2) unable to get MRI's of knees do to wt 3) r/o for vascular disease with bernice  -pain meds-change to norco from percocet as pt states percocet does not help, may need oxy if needs tylenol for ongoing fevers to not exceed tylenol burden, prn tramadol and flexeril   -per [facility] notes- NWB RLE (can do active and passive ROM in NWB status) and WBAT to LLE, needs to wear immobilizers on right at all times, may remove mobilizers on left leg when in bed  -follow up with Dr Cook,appt 3/18 scheduled     DM Type II  -HgbA1C  7.6  -SSI prn  -accuchecks  -ADA diet      AYANNA  -cpap     Insomnia    -continue home meds-currently only taking trazodone     Migraines  Dizziness   -per med rec no longer taking topiramate, qulipta or eletriptan  -follows with Dr Acevedo-neurology      Morbid Obesity  -PER [facility] notes--he briefly tried metformin, has not tried any new meds or d/w PCP. Encourage a strict change in diet and if possible/qualifies/affords new injectable wt loss meds such as ozempic/wegovy/mounjaro  -recommend follow up with pcp      [facility]  -full code  -POA father      MA/ACO Reach  -Re- Entry: 2/16 discharge from [facility] to [facility]  -Consults: none  -Discharge Needs: return to [facility]  -Appointments: follow up Dr Jenna Gutierrez MD within 1 week of discharge from rehab      [facility] in am  -diet-ADA     Prophy  -SCD  -xarelto      Dispo  -pending clinical course    Having fevers still, tmax 101.8 this am. Still complains of cough but none noted in room. Noted LE edema. Took tramadol 100 mg over 24 hours.         OBJECTIVE:   Blood pressure 139/76, pulse 106, temperature (!) 101.8 °F (38.8 °C), temperature source Oral, resp. rate 16, height 5' 10\" (1.778 m), weight (!) 490 lb 9.6 oz (222.5 kg), SpO2 92%.     GENERAL: no apparent distress, overweight  NEURO: A/A Ox3  RESP: non labored, CTA  CARDIO: irregular   ABD: soft, NT, ND, BS+  EXTREMITIES: pitting le edema      DIAGNOSTIC DATA:   Labs:           Recent Labs   Lab 03/11/24  0633 03/12/24  0521   WBC 5.6 5.3   HGB 14.4 14.5   MCV 93.6 91.3   .0 141.0*              Recent Labs   Lab 03/11/24  0633 03/12/24  0656   * 134*   K 4.1 4.0    105   CO2 26.0 25.0   BUN 13 11   CREATSERUM 0.84 0.74   CA 9.1 9.0   * 121*      furosemide  40 mg Intravenous Daily    insulin aspart  1-5 Units Subcutaneous TID CC    cefTRIAXone  2 g Intravenous Q24H    azithromycin  500 mg Intravenous Q24H     SEE ATTENDING NOTE BELOW:   Patient seen and examined. Agree with documentation above     Subjective: Patient doing okay from respiratory standpoint.  He is  complaining of his pain regimen not being adequate.  Noted that he did not try his oral Percocet however.  Discussed we can increase doses depending on how the existing regimen works.  She did have a fever of 101.8 this morning.  He is not requiring oxygen         Vitals:     03/12/24 1131   BP:     Pulse:     Resp:     Temp: 98.7 °F (37.1 °C)   Gen: comfortable  Chest: non tender  Lungs: clear  Heart RRR  Legs Trace to +1 edema         Cough, fever  -symptoms appear viral   -human metapneumovirus noted   -on IV antibiotics, will stop most likely after today  -mrsa screen negative     Chronic diastolic CHF  -agree with IV lasix     AYANNA  -use cpap here with sleep      Post operative pain, knee pain  -continue oral regimen as prescribed. Will monitor response         MEDICATIONS ADMINISTERED IN LAST 1 DAY:  acetaminophen (Tylenol) tab 650 mg       Date Action Dose Route User    3/12/2024 2134 Given 650 mg Oral Ben Arellano RN    3/12/2024 0828 Given 650 mg Oral Ailin Patterson RN          aspirin DR tab 81 mg       Date Action Dose Route User    3/12/2024 0828 Given 81 mg Oral Ailin Patterson RN          azithromycin (Zithromax) 500 mg in sodium chloride 0.9% 250mL IVPB premix       Date Action Dose Route User    3/12/2024 1230 New Bag 500 mg Intravenous Ailin Patterson RN          cefTRIAXone (Rocephin) 2 g in D5W 100 mL IVPB-ADD       Date Action Dose Route User    3/12/2024 1619 New Bag 2 g Intravenous Ailin Patterson RN          dilTIAZem ER (CardIZEM CD) 24 hr cap 360 mg       Date Action Dose Route User    3/12/2024 0827 Given 360 mg Oral Ailin Patterson RN          furosemide (Lasix) 10 mg/mL injection 40 mg       Date Action Dose Route User    3/12/2024 0827 Given 40 mg Intravenous Ailin Pattesron RN          furosemide (Lasix) 10 mg/mL injection 40 mg       Date Action Dose Route User    3/12/2024 1619 Given 40 mg Intravenous Ailin Patterson RN          guaiFENesin (Robitussin) 100 MG/5 ML oral  liquid 100 mg       Date Action Dose Route User    3/13/2024 0527 Given 100 mg Oral Ben Arellano RN          lisinopril (Prinivil; Zestril) tab 5 mg       Date Action Dose Route User    3/12/2024 0827 Given 5 mg Oral Ailin Patterson RN          metoprolol succinate ER (Toprol XL) 24 hr tab 100 mg       Date Action Dose Route User    3/12/2024 0828 Given 100 mg Oral Ailin Patterson RN          rivaroxaban (Xarelto) tab 20 mg       Date Action Dose Route User    3/12/2024 0828 Given 20 mg Oral Ailin Patterson RN          traMADol (Ultram) tab 100 mg       Date Action Dose Route User    3/13/2024 0523 Given 100 mg Oral Ben Arellano RN          traZODone (Desyrel) tab 200 mg       Date Action Dose Route User    3/12/2024 2134 Given 200 mg Oral Ben Arellano RN            Vitals (last day)       Date/Time Temp Pulse Resp BP SpO2 Weight O2 Device O2 Flow Rate (L/min) [facility]    03/13/24 0545 -- -- -- -- -- 468 lb 11.2 oz -- --     03/13/24 0519 97.4 °F (36.3 °C) 105 18 99/62 91 % -- None (Room air) --     03/12/24 2125 98.5 °F (36.9 °C) 92 19 95/70 92 % -- None (Room air) --     03/12/24 1617 -- -- 18 -- 94 % -- None (Room air) -- SC    03/12/24 1617 98.3 °F (36.8 °C) 81 -- 123/71 -- -- -- --     03/12/24 1131 98.7 °F (37.1 °C) -- -- -- -- -- -- -- SC    03/12/24 0830 101.8 °F (38.8 °C) 106 16 139/76 92 % -- None (Room air) --     03/12/24 0441 99.5 °F (37.5 °C) 92 18 137/67 -- -- -- -- WON    03/12/24 0441 -- -- -- -- 97 % -- None (Room air) -- LYRIC

## 2024-03-13 NOTE — CONGREGATE LIVING REVIEW
[facility] Authorization    The [facility] Review Committee has reviewed this case and the patient IS APPROVED for discharge to a facility for Short Term Skilled once the following procedure is followed:     - The physician discharge instructions (contained within the BASIL note for SNF) must inlcude the below appropriate and approved COVID instructions to the facility    For questions regarding CLRC approval process, please contact the CM assigned to the case.  For questions regarding RN discharge workflow, please contact the unit Clinical Leader.

## 2024-03-13 NOTE — PROGRESS NOTES
[facility] AND CARE   Progress Note  -  Deneen Chapa Patient Status:  Inpatient    1965 MRN [id number]   Location [facility]5W Attending Johny Prince,    Hosp Day # 2 PCP Jenna Gutierrez MD     PCP: Jenna Gutierrez MD      SEE ATTENDING NOTE AT BOTTOM OF PAGE    Is this a shared or split note between Advanced Practice Provider and Physician? Yes    Assessment and Plan:  Deneen Chapa is a 59 year old male with PMH significant for CHF-diastolic, insomnia, migraines, afib, secondary hypercoagulable state, insomnia, HTN, HL, DM- diet controlled, AYANNA, morbid obesity with recent admission to [facility] with  a mechanical fall with B/L Knee Pain  found to have a right tibial plateau fx and possible Left MCL strain who presents from [facility] with cough, sob and fevers found to have metapneumovirus and possible pna. Hospital course complicated by fibrillation with rapid ventricular response s see below for details      Fever/Cough/SOB 2/2 Metapneumovirus possible PNA   -tmax 100.8. WBC normal. PCT normal   -CXR preliminary with consolidation Left lung   -negative for influenza, covid and RSV  -blood cx NGTD  -RVP + for metapneumovirus   -MRSA screen negative   -not felt to  have pe as pt on xarelto  -prn nebs and antitussive's  -abx- cefriaxone day 3/5 and zithromax day 3  -on RA    Acute on Chronic CHF, diastolic   Persistent Atrial fibrillation with RVR  Secondary Hypercoagulable State  HTN  HL-not on meds  - echo with EF 45-50%  -tele  -CXR not suggestive of CHF but noted LE edema. BNP 17  -home meds-asa, Cardizem, Toprol, lisinopril and xarelto   -Cardizem to 240 twice daily, add digoxin 0.125 mg daily  -Patient on lisinopril with lower blood pressures.  -Resume oral home Lasix    Mechanical fall w/ shannon knee pain  Right Tibial Fx  Possible Left MCL Sprain   -work up at [facility] 1)CT of right knee-mildly comminuted and minimally displaced intra-articular fracture along the lateral tibial plateau 2)  unable to get MRI's of knees do to wt 3) r/o for vascular disease with bernice  -pain meds-pt currently using tramadol and tylenol   -per [facility] notes- NWB RLE (can do active and passive ROM in NWB status) and WBAT to LLE, needs to wear immobilizers on right at all times, may remove mobilizers on left leg when in bed  -follow up with Dr Cook,appt 3/18 scheduled     DM Type II  -HgbA1C  7.6  -SSI prn  -accuchecks  -ADA diet     AYANNA  -cpap     Insomnia   -continue home meds-currently only taking trazodone     Migraines  Dizziness   -per med rec no longer taking topiramate, qulipta or eletriptan  -follows with Dr Acevedo-neurology      Morbid Obesity  -PER [facility] notes--he briefly tried metformin, has not tried any new meds or d/w PCP. Encourage a strict change in diet and if possible/qualifies/affords new injectable wt loss meds such as ozempic/wegovy/mounjaro  -recommend follow up with pcp      [facility]  -full code  -POA father      MA/ACO Reach  -Re- Entry: 2/16 discharge from [facility] to [facility]  -Consults: none  -Discharge Needs: return to [facility] at [facility]  -Appointments: follow up Dr Jenna Gutierrez MD within 1 week of discharge from rehab      [facility]  -diet-ADA     Prophy  -SCD  -xarelto      Dispo  -May discharge to rehab tomorrow pending heart rate control.  PCP: Jenna Gutierrez MD        Outpatient records or previous hospital records reviewed.   Further recommendations pending patient's clinical course.  [facility] hospitalist  team to continue to follow patient while in house  Concerns regarding plan of care were discussed with patient. Patient agrees with plan as detailed above. Discussed plan of care with Dr. Prince      Note: This chart was prepared using voice recognition software and may contain unintended word substitution errors.      Susu Cameron RN, NP  [facility] Hospitalist Team   Available via Perfect Serve or Bubble Chat (check Availability)    SUBJECTIVE:   Has been fever free for the last 24 hours.   Still having issues with elevated heart rate with underlying atrial fibrillation especially with activity.  Tells me his cough is not as intense and he is breathing better.      OBJECTIVE:   Blood pressure (!) 132/96, pulse (!) 152, temperature 98 °F (36.7 °C), temperature source Oral, resp. rate 19, height 5' 10\" (1.778 m), weight (!) 468 lb 11.2 oz (212.6 kg), SpO2 90%.    GENERAL: no apparent distress, overweight  NEURO: A/A Ox3  RESP: non labored, CTA  CARDIO: irregular   ABD: soft, NT, ND, BS+  EXTREMITIES: right leg brace, improved edema to legs     DIAGNOSTIC DATA:   Labs:     Recent Labs   Lab 03/11/24  0633 03/12/24  0521 03/13/24  0537   WBC 5.6 5.3 4.9   HGB 14.4 14.5 14.6   MCV 93.6 91.3 88.6   .0 141.0* 141.0*       Recent Labs   Lab 03/11/24  0633 03/12/24  0656 03/13/24  0537   * 134* 139   K 4.1 4.0 3.6    105 105   CO2 26.0 25.0 26.0   BUN 13 11 13   CREATSERUM 0.84 0.74 0.74   CA 9.1 9.0 8.7   * 121* 114*       No results for input(s): \"ALT\", \"AST\", \"ALB\", \"AMYLASE\", \"LIPASE\", \"LDH\" in the last 168 hours.    Invalid input(s): \"ALPHOS\", \"TBIL\", \"DBIL\", \"TPROT\"    Recent Labs   Lab 03/12/24  1258 03/12/24  1802 03/12/24  2059 03/13/24  0729 03/13/24  1140   PGLU 125* 137* 168* 108* 113*       No results for input(s): \"TROP\" in the last 168 hours.        MEDICATIONS       dilTIAZem ER  120 mg Oral Once    [START ON 3/14/2024] dilTIAZem ER  240 mg Oral BID    digoxin  125 mcg Oral Daily    azithromycin  500 mg Intravenous Q24H    [Held by provider] furosemide  40 mg Intravenous BID (Diuretic)    aspirin  81 mg Oral Daily    metoprolol succinate ER  100 mg Oral BID    rivaroxaban  20 mg Oral Daily with food    traZODone  200 mg Oral Nightly    insulin aspart  1-5 Units Subcutaneous TID CC    cefTRIAXone  2 g Intravenous Q24H       HYDROcodone-acetaminophen, ondansetron, prochlorperazine, bisacodyl, fleet enema, benzonatate, acetaminophen, cyclobenzaprine, guaiFENesin,  polyethylene glycol (PEG 3350), sennosides, traMADol, ipratropium-albuterol, glucose **OR** glucose **OR** glucose-vitamin C **OR** dextrose **OR** glucose **OR** glucose **OR** glucose-vitamin C    Susu Cameron, NP      IMAGING     XR CHEST AP PORTABLE  (CPT=71045)    Result Date: 3/11/2024  CONCLUSION:  1. Left perihilar pneumonia.  Follow-up to complete radiographic resolution recommended.    Dictated by (CST): Manuel Portillo MD on 3/11/2024 at 2:06 PM     Finalized by (CST): Manuel Portillo MD on 3/11/2024 at 2:07 PM             SEE ATTENDING NOTE BELOW:   Patient seen and examined. Agree with documentation above     Subjective: Patient doing okay from respiratory standpoint. No further fever.        Vitals:    03/13/24 1314   BP: 125/71   Pulse: (!) 122   Resp:    Temp:    Gen: comfortable  Chest: non tender  Lungs: clear  Heart RRR     Cough, fever  -symptoms appear viral   -human metapneumovirus noted   -on IV rocephin, day 3, completed azithromycin  -would treat 5 days total of antibiotics, start oral abx tomorrow   -mrsa screen negative     Chronic diastolic CHF  -IV lasix initially, now on oral      AYANNA  -use cpap here with sleep      Post operative pain, knee pain  -continue oral regimen as prescribed. Will monitor response     Remainder as stated above in NP note     Johny Prince DO

## 2024-03-13 NOTE — PHYSICAL THERAPY NOTE
PHYSICAL THERAPY EVALUATION - INPATIENT     Room Number: 519/519-A  Evaluation Date: 3/13/2024  Type of Evaluation: Initial   Physician Order: PT Eval and Treat    Presenting Problem: pneumonia  Co-Morbidities : Hx migraines, a fib, DM2  Reason for Therapy: Mobility Dysfunction and Discharge Planning    PHYSICAL THERAPY ASSESSMENT   Patient is a 59 year old male admitted 3/11/2024 for SOB, cough, diagnosed with pneumonia. See above for history, admitted from [facility].  Prior to previous admission to [facility], patient's baseline is independent, lives alone, did not use assistive device.  Patient is currently functioning below baseline with bed mobility, transfers, and gait.  Patient is requiring maximum assist as a result of the following impairments: decreased functional strength, decreased endurance/aerobic capacity, pain, and medical status.  Physical Therapy will continue to follow for duration of hospitalization.    Patient will benefit from continued skilled PT Services to promote return to prior level of function and safety with continuous assistance and gradual rehabilitative therapy .    PLAN  PT Treatment Plan: Bed mobility;Endurance;Energy conservation;Patient education;Strengthening;Range of motion;Transfer training  Rehab Potential : Good  Frequency (Obs): 3-5x/week    PHYSICAL THERAPY MEDICAL/SOCIAL HISTORY   History related to current admission:  Pt admitted from [facility] due to cough, diagnosed with pneumonia.   From Dr. Cao via Epic chat \"Definitely needs to maintain RLE NWB and knee immobilizer. Left knee can progress WB and if more stable could progress out of the knee immobilizer at this time. If still unstable when up and on the extremity should continue in the immobilizer. \"                  Problem List  Principal Problem:    Hospital-acquired pneumonia  Active Problems:    Bronchospasm      HOME SITUATION  Home Situation  Type of Home: Skilled nursing facility (Admitted from [facility]; from apartment alone without stairs at baseline)  Home Layout: One level  Lives With: Staff 24 hours  Drives: Yes  Patient Owned Equipment: None  Patient Regularly Uses: Glasses     Prior Level of [place]: pt reports ind prior to admit to [facility]2/10/24, pt reports completely independent with all fxal mobility    SUBJECTIVE  \"I have been standing on both legs without these braces at [facility]\"  Discussed at length ortho MD current orders, pt agreeable and expresses understanding    PHYSICAL THERAPY EXAMINATION   OBJECTIVE  Precautions: Bed/chair alarm;Knee immobilizer (R KI at all times, okay to remove L KI)  From Dr. Cao via Epic chat 3/12/24 \"Definitely needs to maintain RLE NWB and knee immobilizer. Left knee can progress WB and if more stable could progress out of the knee immobilizer at this time. If still unstable when up and on the extremity should continue in the immobilizer. \"              Fall Risk: High fall risk     WEIGHT BEARING RESTRICTION  Weight Bearing Restriction: L lower extremity;R lower extremity      , if unstable, KI left  R Lower Extremity: Non-Weight Bearing (with KI at all tmies)  L Lower Extremity: Weight Bearing as Tolerated    PAIN ASSESSMENT  Rating: Unable to rate  Location: right knee  Management Techniques: Repositioning    COGNITION  Overall Cognitive Status:  WFL - within functional limits    RANGE OF MOTION AND STRENGTH ASSESSMENT  Upper extremity ROM and strength are within functional limits   Lower extremity ROM is within functional limits except bilat knees, KI donned to right LE in supine, KI in room for left LE  Lower extremity strength is within functional limits except bilat knees    BALANCE  Static Sitting: Good  Dynamic Sitting: Good  Static Standing: Not tested  Dynamic Standing: Not tested    ADDITIONAL TESTS                                    NEUROLOGICAL FINDINGS                      ACTIVITY TOLERANCE  Pulse: (!) 152 (124-152 bpm with activity, sitting up  eob)  Heart Rate Source: Monitor     BP: (!) 132/96  BP Location: Right arm  BP Method: Automatic  Patient Position: Sitting    O2 WALK  Oxygen Therapy  SPO2% on Room Air at Rest: 91    AM-PAC '6-Clicks' INPATIENT SHORT FORM - BASIC MOBILITY  How much difficulty does the patient currently have...  Patient Difficulty: Turning over in bed (including adjusting bedclothes, sheets and blankets)?: A Little   Patient Difficulty: Sitting down on and standing up from a chair with arms (e.g., wheelchair, bedside commode, etc.): Unable   Patient Difficulty: Moving from lying on back to sitting on the side of the bed?: A Little   How much help from another person does the patient currently need...   Help from Another: Moving to and from a bed to a chair (including a wheelchair)?: Total   Help from Another: Need to walk in hospital room?: Total   Help from Another: Climbing 3-5 steps with a railing?: Total     AM-PAC Score:  Raw Score: 10   Approx Degree of Impairment: 76.75%   Standardized Score (AM-PAC Scale): 32.29   CMS Modifier (G-Code): CL    FUNCTIONAL ABILITY STATUS  Functional Mobility/Gait Assessment  Gait Assistance: Not tested (pt unable to stand this session)  Distance (ft): roll  Rolling: stand-by assist  Supine to Sit: contact guard assist  Sit to Supine: minimal assist  Sit to Stand:  attempted, pt unable to stand to rw    Exercise/Education Provided:  Bed mobility  Energy conservation  Functional activity tolerated  Transfer training    The patient's Approx Degree of Impairment: 76.75% has been calculated based on documentation in the [facility] '6 clicks' Inpatient Basic Mobility Short Form.  Research supports that patients with this level of impairment may benefit from inpatient rehab.  Final disposition will be made by interdisciplinary medical team.    Patient End of Session: In bed;Needs met;Call light within reach;RN aware of session/findings;All patient questions and concerns addressed    CURRENT GOALS  Goals  to be met by: 3/30/24  Patient Goal Patient's self-stated goal is: to be able to stand, walk   Goal #1 Patient is able to demonstrate supine - sit EOB @ level: modified independent     Goal #1   Current Status    Goal #2 Patient is able to demonstrate transfers Sit to/from Stand at assistance level: moderate assistance with walker - rolling NWB right LE     Goal #2  Current Status    Goal #3 Patient is able to ambulate 3 feet with assist device: walker - rolling at assistance level: moderate assistance   Goal #3   Current Status    Goal #4    Goal #4   Current Status    Goal #5 Patient to demonstrate independence with home activity/exercise instructions provided to patient in preparation for discharge.   Goal #5   Current Status    Goal #6    Goal #6  Current Status      Patient Evaluation Complexity Level:  History Moderate - 1 or 2 personal factors and/or co-morbidities   Examination of body systems Moderate - addressing a total of 3 or more elements   Clinical Presentation  Moderate - Evolving   Clinical Decision Making  Moderate Complexity       Therapeutic Activity:  30 minutes  Can add/delete any of these

## 2024-03-13 NOTE — OCCUPATIONAL THERAPY NOTE
OCCUPATIONAL THERAPY EVALUATION - INPATIENT     Room Number: 519/519-A  Evaluation Date: 3/13/2024  Type of Evaluation: Initial  Presenting Problem: hospital acquired PNA, sustained R tibial fx and L MCL sprain after fall, R LE NWB with KI at all times (AROM/PROM okay), L LE WBAT (okay to remove immobilizer)  Hx migraines, a fib, DM2     Physician Order: IP Consult to Occupational Therapy  Reason for Therapy: ADL/IADL Dysfunction and Discharge Planning    OCCUPATIONAL THERAPY ASSESSMENT   Patient is a 59 year old male admitted 3/11/2024 for hospital acquired PNA, sustained R tibial fx and L MCL sprain after fall, R LE NWB with KI at all times (AROM/PROM okay), L LE WBAT (okay to remove immobilizer). At baseline, patient is indep with I/ADLs, including driving, and with fx mobility/transfers without a device.  Patient is currently functioning below baseline with  I/ADLs and fx mobility/transfers . Patient is requiring  up to max assist  as a result of the following impairments: decreased functional strength, decreased functional reach, decreased endurance, pain, impaired balance, decreased muscular endurance, and difficulty maintaining precautions. Occupational Therapy will continue to follow for duration of hospitalization.    Patient will benefit from continued skilled OT Services to promote return to prior level of function and safety with continuous assistance and gradual rehabilitative therapy     PLAN  OT Treatment Plan: Balance activities;Energy conservation/work simplification techniques;ADL training;Functional transfer training;Endurance training;Patient/Family education;Patient/Family training;Equipment eval/education;Compensatory technique education  OT Device Recommendations: TBD    OCCUPATIONAL THERAPY MEDICAL/SOCIAL HISTORY   Problem List   Principal Problem:    Hospital-acquired pneumonia  Active Problems:    Bronchospasm    HOME SITUATION  Type of Home: Skilled nursing facility (Admitted from  [facility]; from apartment alone without stairs at baseline)  Home Layout: One level  Lives With: Staff 24 hours  Drives: Yes  Patient Regularly Uses: Glasses  Assistive Device(s) Used: None     Prior Level of [place]: Independent with I/ADLs, including driving, and with fx mobility/transfers without a device.    SUBJECTIVE  \"I was completely independent before this.\"    OCCUPATIONAL THERAPY EXAMINATION   OBJECTIVE  Precautions: Bed/chair alarm; Knee immobilizer (R KI at all times, okay to remove L KI)  Fall Risk: High fall risk    WEIGHT BEARING RESTRICTION  R Lower Extremity: Non-Weight Bearing (with KI at all tmies)  L Lower Extremity: Weight Bearing as Tolerated    PAIN ASSESSMENT  Rating: -- (not rated)  Location: R LE  Management Techniques: Body mechanics; Activity promotion; Repositioning; Nurse notified    ACTIVITY TOLERANCE  Pulse: (!) 152 (up to, with bed mobility)  Heart Rate Source: Monitor     BP: (!) 132/96  BP Location: Right arm  BP Method: Automatic  Patient Position: Sitting    O2 SATURATIONS  Oxygen Therapy  SPO2% on Room Air at Rest: 91 (at EOB)    COGNITION  Overall Cognitive Status:  WFL - within functional limits    RANGE OF MOTION   Upper extremity ROM is within functional limits     STRENGTH ASSESSMENT  Upper extremity strength is within functional limits     ACTIVITIES OF DAILY LIVING ASSESSMENT  AM-PAC ‘6-Clicks’ Inpatient Daily Activity Short Form  How much help from another person does the patient currently need…  -   Putting on and taking off regular lower body clothing?: A Lot  -   Bathing (including washing, rinsing, drying)?: A Lot  -   Toileting, which includes using toilet, bedpan or urinal? : Total  -   Putting on and taking off regular upper body clothing?: A Lot  -   Taking care of personal grooming such as brushing teeth?: A Little  -   Eating meals?: A Little    AM-PAC Score:  Score: 13  Approx Degree of Impairment: 63.03%  Standardized Score (AM-PAC Scale):  32.03  CMS Modifier (G-Code): CL    BED MOBILITY  Supine <> Sit: SUP  Rolling: SUP to facilitate changing of bed linens    FUNCTIONAL TRANSFER ASSESSMENT  Attempted Sit to Stand from EOB: Unable to perform while adhering to R LE NWB    FUNCTIONAL ADL ASSESSMENT  Eating: setup assist (per obs)   Grooming: setup assist (per obs)   UB Dressing: setup assist for donning gown while seated at EOB  LB Dressing: max assist for donning R KI at bed-level  Toileting: max assist (per obs)     EDUCATION PROVIDED  Patient: Role of Occupational Therapy; Plan of Care; Discharge Recommendations; Functional Transfer Techniques; Fall Prevention; Weight Bear Status; Posture/Positioning; Bracing Education Provided; Proper Body Mechanics  Patient's Response to Education: Verbalized Understanding; Returned Demonstration    The patient's Approx Degree of Impairment: 63.03% has been calculated based on documentation in the [facility] '6 clicks' Inpatient Daily Activity Short Form.  Research supports that patients with this level of impairment may benefit from rehab.  Final disposition will be made by interdisciplinary medical team.    Patient End of Session: In bed;Call light within reach;Needs met;RN aware of session/findings;All patient questions and concerns addressed;Bracing education provided per handout    OT Goals  Patient self-stated goal is: none stated     Patient will complete functional transfer with Max A x1  Comment:     Patient will tolerate standing for 1-2 min in prep for adls with Max A x1  Comment:    Patient will complete oral/facial grooming task in supported sitting with Setup Assist  Comment:            Goals  on: 3/27/24  Frequency: 3-5x/week    Patient Evaluation Complexity Level:   Occupational Profile/Medical History MODERATE - Expanded review of history including review of medical or therapy record   Specific performance deficits impacting engagement in ADL/IADL MODERATE  3 - 5 performance deficits   Client  Assessment/Performance Deficits MODERATE - Comorbidities and min to mod modifications of tasks    Clinical Decision Making MODERATE - Analysis of occupational profile, detailed assessments, several treatment options    Overall Complexity MODERATE     OT Session Time  Therapeutic Activity: 39 minutes    SHOAIB Roca/L  [facility]  #[id number]

## 2024-03-13 NOTE — PLAN OF CARE
Pt a&o x4, room air, on tele, max assist. Patient reporting pain to knees and back. IV antibiotics continue to infuse as ordered. Worked with PT/OT this morning, immobilizers used with non-weight bearing status. Elevated HR this morning, due to held dosage of metoprolol overnight; MD aware. Plan is to continue antibiotics. Safety precautions maintained. Call light in reach.     Problem: Patient Centered Care  Goal: Patient preferences are identified and integrated in the patient's plan of care  Description: Interventions:  - What would you like us to know as we care for you? From [facility]  - Provide timely, complete, and accurate information to patient/family  - Incorporate patient and family knowledge, values, beliefs, and cultural backgrounds into the planning and delivery of care  - Encourage patient/family to participate in care and decision-making at the level they choose  - Honor patient and family perspectives and choices  Outcome: Progressing     Problem: Patient/Family Goals  Goal: Patient/Family Long Term Goal  Description: Patient's Long Term Goal: discharge    Interventions:  - Monitor vital signs, labs, test results  - Monitor blood glucose levels  - Oxygen and respiratory therapy as needed  - Pain management  - Follow MD orders  - Administer medications per MD order  - Diagnostics per order  - Update /  inform patient on plan of care  - Discharge planning  - See additional Care Plan goals for specific interventions  Outcome: Progressing  Goal: Patient/Family Short Term Goal  Description: Patient's Short Term Goal: to breath better and resolve cough    Interventions:   - Monitor vital signs, labs, test results  - Oxygen and respiratory therapy as needed  - Follow MD orders  - Administer medications per MD order    - See additional Care Plan goals for specific interventions  Outcome: Progressing     Problem: CARDIOVASCULAR - ADULT  Goal: Maintains optimal cardiac output and hemodynamic  stability  Description: INTERVENTIONS:  - Monitor vital signs, rhythm, and trends  - Monitor for bleeding, hypotension and signs of decreased cardiac output  - Evaluate effectiveness of vasoactive medications to optimize hemodynamic stability  - Monitor arterial and/or venous puncture sites for bleeding and/or hematoma  - Assess quality of pulses, skin color and temperature  - Assess for signs of decreased coronary artery perfusion - ex. Angina  - Evaluate fluid balance, assess for edema, trend weights  Outcome: Progressing  Goal: Absence of cardiac arrhythmias or at baseline  Description: INTERVENTIONS:  - Continuous cardiac monitoring, monitor vital signs, obtain 12 lead EKG if indicated  - Evaluate effectiveness of antiarrhythmic and heart rate control medications as ordered  - Initiate emergency measures for life threatening arrhythmias  - Monitor electrolytes and administer replacement therapy as ordered  Outcome: Progressing     Problem: RESPIRATORY - ADULT  Goal: Achieves optimal ventilation and oxygenation  Description: INTERVENTIONS:  - Assess for changes in respiratory status  - Assess for changes in mentation and behavior  - Position to facilitate oxygenation and minimize respiratory effort  - Oxygen supplementation based on oxygen saturation or ABGs  - Provide Smoking Cessation handout, if applicable  - Encourage broncho-pulmonary hygiene including cough, deep breathe, Incentive Spirometry  - Assess the need for suctioning and perform as needed  - Assess and instruct to report SOB or any respiratory difficulty  - Respiratory Therapy support as indicated  - Manage/alleviate anxiety  - Monitor for signs/symptoms of CO2 retention  Outcome: Progressing     Problem: METABOLIC/FLUID AND ELECTROLYTES - ADULT  Goal: Glucose maintained within prescribed range  Description: INTERVENTIONS:  - Monitor Blood Glucose as ordered  - Assess for signs and symptoms of hyperglycemia and hypoglycemia  - Administer ordered  medications to maintain glucose within target range  - Assess barriers to adequate nutritional intake and initiate nutrition consult as needed  - Instruct patient on self management of diabetes  Outcome: Progressing  Goal: Electrolytes maintained within normal limits  Description: INTERVENTIONS:  - Monitor labs and rhythm and assess patient for signs and symptoms of electrolyte imbalances  - Administer electrolyte replacement as ordered  - Monitor response to electrolyte replacements, including rhythm and repeat lab results as appropriate  - Fluid restriction as ordered  - Instruct patient on fluid and nutrition restrictions as appropriate  Outcome: Progressing  Goal: Hemodynamic stability and optimal renal function maintained  Description: INTERVENTIONS:  - Monitor labs and assess for signs and symptoms of volume excess or deficit  - Monitor intake, output and patient weight  - Monitor urine specific gravity, serum osmolarity and serum sodium as indicated or ordered  - Monitor response to interventions for patient's volume status, including labs, urine output, blood pressure (other measures as available)  - Encourage oral intake as appropriate  - Instruct patient on fluid and nutrition restrictions as appropriate  Outcome: Progressing     Problem: SKIN/TISSUE INTEGRITY - ADULT  Goal: Skin integrity remains intact  Description: INTERVENTIONS  - Assess and document risk factors for pressure ulcer development  - Assess and document skin integrity  - Monitor for areas of redness and/or skin breakdown  - Initiate interventions, skin care algorithm/standards of care as needed  Outcome: Progressing     Problem: Impaired Functional Mobility  Goal: Achieve highest/safest level of mobility/gait  Description: Interventions:  - Assess patient's functional ability and stability  - Promote increasing activity/tolerance for mobility and gait  - Educate and engage patient/family in tolerated activity level and precautions  -  Recommend use of chair position in bed 3 times per day  Outcome: Progressing

## 2024-03-13 NOTE — PLAN OF CARE
Problem: Patient Centered Care  Goal: Patient preferences are identified and integrated in the patient's plan of care  Description: Interventions:  - What would you like us to know as we care for you? From [facility]  - Provide timely, complete, and accurate information to patient/family  - Incorporate patient and family knowledge, values, beliefs, and cultural backgrounds into the planning and delivery of care  - Encourage patient/family to participate in care and decision-making at the level they choose  - Honor patient and family perspectives and choices  Outcome: Progressing     Problem: Patient/Family Goals  Goal: Patient/Family Long Term Goal  Description: Patient's Long Term Goal: discharge    Interventions:  - Monitor vital signs, labs, test results  - Monitor blood glucose levels  - Oxygen and respiratory therapy as needed  - Pain management  - Follow MD orders  - Administer medications per MD order  - Diagnostics per order  - Update /  inform patient on plan of care  - Discharge planning  - See additional Care Plan goals for specific interventions  Outcome: Progressing  Goal: Patient/Family Short Term Goal  Description: Patient's Short Term Goal: to breath better and resolve cough    Interventions:   - Monitor vital signs, labs, test results  - Oxygen and respiratory therapy as needed  - Follow MD orders  - Administer medications per MD order    - See additional Care Plan goals for specific interventions  Outcome: Progressing     Problem: CARDIOVASCULAR - ADULT  Goal: Maintains optimal cardiac output and hemodynamic stability  Description: INTERVENTIONS:  - Monitor vital signs, rhythm, and trends  - Monitor for bleeding, hypotension and signs of decreased cardiac output  - Evaluate effectiveness of vasoactive medications to optimize hemodynamic stability  - Monitor arterial and/or venous puncture sites for bleeding and/or hematoma  - Assess quality of pulses, skin color and temperature  -  Assess for signs of decreased coronary artery perfusion - ex. Angina  - Evaluate fluid balance, assess for edema, trend weights  Outcome: Progressing  Goal: Absence of cardiac arrhythmias or at baseline  Description: INTERVENTIONS:  - Continuous cardiac monitoring, monitor vital signs, obtain 12 lead EKG if indicated  - Evaluate effectiveness of antiarrhythmic and heart rate control medications as ordered  - Initiate emergency measures for life threatening arrhythmias  - Monitor electrolytes and administer replacement therapy as ordered  Outcome: Progressing     Problem: RESPIRATORY - ADULT  Goal: Achieves optimal ventilation and oxygenation  Description: INTERVENTIONS:  - Assess for changes in respiratory status  - Assess for changes in mentation and behavior  - Position to facilitate oxygenation and minimize respiratory effort  - Oxygen supplementation based on oxygen saturation or ABGs  - Provide Smoking Cessation handout, if applicable  - Encourage broncho-pulmonary hygiene including cough, deep breathe, Incentive Spirometry  - Assess the need for suctioning and perform as needed  - Assess and instruct to report SOB or any respiratory difficulty  - Respiratory Therapy support as indicated  - Manage/alleviate anxiety  - Monitor for signs/symptoms of CO2 retention  Outcome: Progressing     Problem: METABOLIC/FLUID AND ELECTROLYTES - ADULT  Goal: Glucose maintained within prescribed range  Description: INTERVENTIONS:  - Monitor Blood Glucose as ordered  - Assess for signs and symptoms of hyperglycemia and hypoglycemia  - Administer ordered medications to maintain glucose within target range  - Assess barriers to adequate nutritional intake and initiate nutrition consult as needed  - Instruct patient on self management of diabetes  Outcome: Progressing  Goal: Electrolytes maintained within normal limits  Description: INTERVENTIONS:  - Monitor labs and rhythm and assess patient for signs and symptoms of electrolyte  imbalances  - Administer electrolyte replacement as ordered  - Monitor response to electrolyte replacements, including rhythm and repeat lab results as appropriate  - Fluid restriction as ordered  - Instruct patient on fluid and nutrition restrictions as appropriate  Outcome: Progressing  Goal: Hemodynamic stability and optimal renal function maintained  Description: INTERVENTIONS:  - Monitor labs and assess for signs and symptoms of volume excess or deficit  - Monitor intake, output and patient weight  - Monitor urine specific gravity, serum osmolarity and serum sodium as indicated or ordered  - Monitor response to interventions for patient's volume status, including labs, urine output, blood pressure (other measures as available)  - Encourage oral intake as appropriate  - Instruct patient on fluid and nutrition restrictions as appropriate  Outcome: Progressing     Problem: SKIN/TISSUE INTEGRITY - ADULT  Goal: Skin integrity remains intact  Description: INTERVENTIONS  - Assess and document risk factors for pressure ulcer development  - Assess and document skin integrity  - Monitor for areas of redness and/or skin breakdown  - Initiate interventions, skin care algorithm/standards of care as needed  Outcome: Progressing     Problem: Impaired Functional Mobility  Goal: Achieve highest/safest level of mobility/gait  Description: Interventions:  - Assess patient's functional ability and stability  - Promote increasing activity/tolerance for mobility and gait  - Educate and engage patient/family in tolerated activity level and precautions  - Recommend use of chair position in bed 3 times per day  Outcome: Progressing     Monitoring vital signs, stable at this moment. Monitoring blood glucose levels. Pain medication provided as needed. Call light within reach, bed locked in lowest position, all fall precautions in place. All needs addressed. Frequent rounding maintained by nursing staff. Patient completing IV antibiotics  per MD order, started IV lasix therapy, had large BM, no acute changes at this time.

## 2024-03-14 PROBLEM — J18.9 HOSPITAL-ACQUIRED PNEUMONIA: Status: RESOLVED | Noted: 2024-03-11 | Resolved: 2024-03-14

## 2024-03-14 PROBLEM — B97.81 ACUTE BRONCHITIS DUE TO HUMAN METAPNEUMOVIRUS: Status: ACTIVE | Noted: 2024-03-14
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
## 2024-03-14 PROBLEM — J98.01 BRONCHOSPASM: Status: RESOLVED | Noted: 2024-03-11 | Resolved: 2024-03-14

## 2024-03-14 PROBLEM — J20.8 ACUTE BRONCHITIS DUE TO HUMAN METAPNEUMOVIRUS: Status: ACTIVE | Noted: 2024-03-14

## 2024-03-14 LAB
ANION GAP SERPL CALC-SCNC: 7 MMOL/L (ref 0–18)
BASOPHILS # BLD AUTO: 0.05 X10(3) UL (ref 0–0.2)
BASOPHILS NFR BLD AUTO: 1 %
BUN BLD-MCNC: 11 MG/DL (ref 9–23)
BUN/CREAT SERPL: 15.9 (ref 10–20)
CALCIUM BLD-MCNC: 8.9 MG/DL (ref 8.7–10.4)
CHLORIDE SERPL-SCNC: 106 MMOL/L (ref 98–112)
CO2 SERPL-SCNC: 24 MMOL/L (ref 21–32)
CREAT BLD-MCNC: 0.69 MG/DL
DEPRECATED RDW RBC AUTO: 44.8 FL (ref 35.1–46.3)
EGFRCR SERPLBLD CKD-EPI 2021: 106 ML/MIN/1.73M2 (ref 60–?)
EOSINOPHIL # BLD AUTO: 0.18 X10(3) UL (ref 0–0.7)
EOSINOPHIL NFR BLD AUTO: 3.6 %
ERYTHROCYTE [DISTWIDTH] IN BLOOD BY AUTOMATED COUNT: 13.3 % (ref 11–15)
GLUCOSE BLD-MCNC: 106 MG/DL (ref 70–99)
GLUCOSE BLDC GLUCOMTR-MCNC: 118 MG/DL (ref 70–99)
GLUCOSE BLDC GLUCOMTR-MCNC: 144 MG/DL (ref 70–99)
GLUCOSE BLDC GLUCOMTR-MCNC: 146 MG/DL (ref 70–99)
GLUCOSE BLDC GLUCOMTR-MCNC: 99 MG/DL (ref 70–99)
HCT VFR BLD AUTO: 42 %
HGB BLD-MCNC: 13.9 G/DL
IMM GRANULOCYTES # BLD AUTO: 0.03 X10(3) UL (ref 0–1)
IMM GRANULOCYTES NFR BLD: 0.6 %
LYMPHOCYTES # BLD AUTO: 1.79 X10(3) UL (ref 1–4)
LYMPHOCYTES NFR BLD AUTO: 35.7 %
MCH RBC QN AUTO: 30.2 PG (ref 26–34)
MCHC RBC AUTO-ENTMCNC: 33.1 G/DL (ref 31–37)
MCV RBC AUTO: 91.1 FL
MONOCYTES # BLD AUTO: 0.68 X10(3) UL (ref 0.1–1)
MONOCYTES NFR BLD AUTO: 13.5 %
NEUTROPHILS # BLD AUTO: 2.29 X10 (3) UL (ref 1.5–7.7)
NEUTROPHILS # BLD AUTO: 2.29 X10(3) UL (ref 1.5–7.7)
NEUTROPHILS NFR BLD AUTO: 45.6 %
OSMOLALITY SERPL CALC.SUM OF ELEC: 284 MOSM/KG (ref 275–295)
PLATELET # BLD AUTO: 161 10(3)UL (ref 150–450)
POTASSIUM SERPL-SCNC: 4.2 MMOL/L (ref 3.5–5.1)
RBC # BLD AUTO: 4.61 X10(6)UL
SODIUM SERPL-SCNC: 137 MMOL/L (ref 136–145)
WBC # BLD AUTO: 5 X10(3) UL (ref 4–11)

## 2024-03-14 PROCEDURE — 94640 AIRWAY INHALATION TREATMENT: CPT

## 2024-03-14 PROCEDURE — 82962 GLUCOSE BLOOD TEST: CPT

## 2024-03-14 PROCEDURE — 85025 COMPLETE CBC W/AUTO DIFF WBC: CPT | Performed by: NURSE PRACTITIONER

## 2024-03-14 PROCEDURE — 80048 BASIC METABOLIC PNL TOTAL CA: CPT | Performed by: NURSE PRACTITIONER

## 2024-03-14 RX ORDER — BENZONATATE 200 MG/1
200 CAPSULE ORAL 3 TIMES DAILY PRN
Status: SHIPPED | COMMUNITY
Start: 2024-03-14

## 2024-03-14 RX ORDER — FUROSEMIDE 20 MG/1
40 TABLET ORAL DAILY
Status: SHIPPED | COMMUNITY
Start: 2024-03-14

## 2024-03-14 RX ORDER — CEFUROXIME AXETIL 500 MG/1
500 TABLET ORAL EVERY 12 HOURS SCHEDULED
Qty: 3 TABLET | Refills: 0 | Status: SHIPPED | OUTPATIENT
Start: 2024-03-14 | End: 2024-03-15

## 2024-03-14 RX ORDER — DILTIAZEM HYDROCHLORIDE 120 MG/1
120 TABLET, FILM COATED ORAL EVERY 6 HOURS SCHEDULED
Status: SHIPPED | COMMUNITY
Start: 2024-03-14

## 2024-03-14 RX ORDER — ACETAMINOPHEN 325 MG/1
650 TABLET ORAL EVERY 6 HOURS PRN
Status: SHIPPED | COMMUNITY
Start: 2024-03-14

## 2024-03-14 RX ORDER — TRAMADOL HYDROCHLORIDE 50 MG/1
100 TABLET ORAL EVERY 8 HOURS PRN
Qty: 20 TABLET | Refills: 0 | Status: SHIPPED | OUTPATIENT
Start: 2024-03-14 | End: 2024-03-20

## 2024-03-14 RX ORDER — DIGOXIN 125 MCG
125 TABLET ORAL DAILY
Status: SHIPPED | COMMUNITY
Start: 2024-03-15

## 2024-03-14 NOTE — DISCHARGE SUMMARY
[facility] Hospitalist Discharge Summary   Patient ID:  Deneen Chapa  [id number]  59 year old  2/28/1965    Admit date: 3/11/2024  Discharge date: 3/1/2024    Primary Care Physician: Jenna Gutierrez MD   Attending Physician: Yoselyn Mota MD   Consults:     Hospital Discharge Diagnoses:   ----See D/C Summary for further Dx    Risk of Readmission Lace+ Score: 73  59-90 High Risk  29-58 Medium Risk  0-28   Low Risk.    TCM Follow-Up Recommendation:  LACE > 58: High Risk of readmission after discharge from the hospital.    Please note that only [facility] and EMG patients enrolled in the Medicare ACO, [facility] ACO and [facility] HMOs will be handled by the [facility] Care Management team.  For all other patients, please follow usual protocol for discharge care transition.    Reason for admission  Deneen Chapa is a 59  year old male with PMH significant for CHF-diastolic, insomnia, migraines, afib, secondary hypercoagulable state, insomnia, HTN, HL, DM- diet controlled, AYANNA, morbid obesity with recent admission to [facility] with  a mechanical fall with B/L Knee Pain  found to have a right tibial plateau fx and possible Left MCL strain     History obtained from patient in the emergency room.  He states that he has been progressing in therapy at [facility] from a mobility perspective and has been there about 3 weeks.  About 4 days ago he noted nonproductive cough and fever of ~99.  He states he is having difficulty breathing.  He does have a history of sleep apnea and does use CPAP.  He also has a history of congestive heart failure related to atrial fibrillation and states he has refused a few doses of his water pills however the dosages are higher than he normally takes.  Upon seeing patient in the emergency room he he has issues with coughing.  He is on room air.  He denied any sore throat sinus pain or ear pain.  He states that he has not been around anybody that he knows that is actively sick other than the staff at the rehab  [place]. CXR suggestive of PNA.  He has noted to be in A-fib with heart rate in the low 100s after getting 15 mg of IV push Cardizem.  He does have he states he continues to have pain in his knees and that the pain medication is not helping however they will not increase the dosages.    Hospital Course:     Deneen Chapa is a 59 year old male with PMH significant for CHF-diastolic, insomnia, migraines, afib, secondary hypercoagulable state, insomnia, HTN, HL, DM- diet controlled, AYANNA, morbid obesity with recent admission to [facility] with  a mechanical fall with B/L Knee Pain  found to have a right tibial plateau fx and possible Left MCL strain who presents from [facility] with cough, sob and fevers found to have metapneumovirus and possible pna. Hospital course complicated by fibrillation with rapid ventricular response, HR improved with adjustment of cardiac meds. Metformin added for DM with HgbA1c of 7.5. pt needs wt loss endeavors. Pt has follow up with ortho on 3/18 as previously scheduled. Pt discharged to [facility], see below for details      Fever/Cough/SOB 2/2 Metapneumovirus possible PNA   -fevers resolved. WBC normal. PCT normal   -CXR preliminary with consolidation Left lung   -negative for influenza, covid and RSV  -blood cx NGTD  -RVP + for metapneumovirus   -MRSA screen negative   -prn anti tussives  -abx- completed zithromax, ceftin to complete 5 day course-EOT 3/16   -on RA     Acute on Chronic CHF, diastolic   Persistent Atrial fibrillation with RVR  Secondary Hypercoagulable State  HTN  HL-not on meds  -2023 echo with EF 45-50%  -CXR not suggestive of CHF but noted LE edema. BNP 17  -continue home meds-asa,lasix, Toprol and xarelto   -Cardizem CD changed to cardizem  mg every 6 hours add digoxin 0.125 mg daily  -holding home lisinopril with lower blood pressures.     Mechanical fall w/ shannon knee pain  Right Tibial Fx  Possible Left MCL Sprain   -work up at [facility] 1)CT of right knee-mildly comminuted and  minimally displaced intra-articular fracture along the lateral tibial plateau 2) unable to get MRI's of knees do to wt 3) r/o for vascular disease with bernice  -pain meds-pt currently using tramadol and tylenol   -per [facility] notes- NWB RLE (can do active and passive ROM in NWB status) and WBAT to LLE, needs to wear immobilizers on right at all times, may remove mobilizers on left leg when in bed  -follow up with Dr Cook,appt 3/18 scheduled     DM Type II  -HgbA1C  7.6  -metformin 500 mg daily added with SSI prn  -accuchecks  -ADA diet      AYANNA  -cpap     Insomnia   -continue home meds-currently only taking trazodone     Migraines  Dizziness   -per med rec no longer taking topiramate, qulipta or eletriptan  -follows with Dr Acevedo-neurology      Morbid Obesity  -PER [facility] notes--he briefly tried metformin, has not tried any new meds or d/w PCP. Encourage a strict change in diet and if possible/qualifies/affords new injectable wt loss meds such as ozempic/wegovy/mounjaro  -recommend follow up with pcp      [facility]  -full code  -POA father      MA/ACO Reach  -Re- Entry: 2/16 discharge from [facility] to [facility]  -Consults: none  -Discharge Needs: return to [facility] at [facility]  -Appointments: follow up Dr Jenna Gutierrez MD within 1 week of discharge from rehab        EXAM:   GENERAL: no apparent distress  NEURO: A/A Ox3  RESP: non labored, CTA  CARDIO: Regular, no murmur  ABD: soft, NT, ND, BS+  EXTREMITIES: brace to leg, trace le edema     Discharge Instructions     Medication List        START taking these medications      benzonatate 200 MG Caps  Commonly known as: Tessalon     cefuroxime 500 MG Tabs  Commonly known as: Ceftin  Take 1 tablet (500 mg total) by mouth every 12 (twelve) hours for 3 doses.     digoxin 0.125 MG Tabs  Commonly known as: Lanoxin     dilTIAZem HCl 120 MG Tabs  Commonly known as: CARDIZEM     insulin aspart 100 Units/mL Sopn  Commonly known as: NovoLOG     metFORMIN 500 MG Tabs  Commonly known as: Glucophage             CHANGE how you take these medications      acetaminophen 325 MG Tabs  Commonly known as: Tylenol  What changed:   medication strength  how much to take  when to take this  reasons to take this            CONTINUE taking these medications      aspirin 81 MG Tbec     furosemide 20 MG Tabs  Commonly known as: Lasix     guaiFENesin 100 MG/5 ML  Commonly known as: Robitussin     metoprolol succinate  MG Tb24  Commonly known as: Toprol XL     Polyethylene Glycol 3350 17 g Pack  Commonly known as: MIRALAX     rivaroxaban 20 MG Tabs  Commonly known as: Xarelto     Sennosides 17.2 MG Tabs     traMADol 50 MG Tabs  Commonly known as: Ultram  Take 2 tablets (100 mg total) by mouth every 8 (eight) hours as needed for Pain.     traZODone 100 MG Tabs  Commonly known as: Desyrel            STOP taking these medications      cyclobenzaprine 10 MG Tabs  Commonly known as: Flexeril     dilTIAZem HCl ER Coated Beads 360 MG Cp24  Commonly known as: CARDIZEM CD     lisinopril 5 MG Tabs  Commonly known as: Prinivil; Zestril     Magnesium 100 MG Caps     Naloxone HCl 4 MG/0.1ML Liqd     oxyCODONE-acetaminophen  MG Tabs  Commonly known as: Percocet               Where to Get Your Medications        You can get these medications from any pharmacy    Bring a paper prescription for each of these medications  cefuroxime 500 MG Tabs  traMADol 50 MG Tabs       Code Status: Full Code    Important follow up:   Follow-up Information       Jenna Gutierrez MD Follow up.    Specialty: Internal Medicine  Why: within 1 week of discharge from rehab  Contact information:  [address]  [address]  [facility] [address]  [phone]               Sumeet Cao MD Follow up on 3/18/2024.    Specialty: SURGERY, ORTHOPEDIC  Why: 3/18 9:45am  Contact information:  [address]  [address]  [facility] [address]  [phone]                           Disposition: SNF  Discharged Condition: stable      Additional patient  instructions:  Complete course of oral antibiotics for pneumonia    Pt has diabetes with HgbA1C of 7.6,. recommend wt loss and ada diet. Metformin 500 mg daily added plus ssi prn, adjust as needed    Pt has follow up ortho appt on 3/18 in [place] as previously scheduled   =========================================================================================================================    I Reconciled current and discharge medications on day of discharge  Patient had opportunity to ask questions and state understand and agree with therapeutic plan as outlined    Total Time Coordinating Care: greater than 30 minutes  Is this a shared or split note between Advanced Practice Provider and Physician? Yes    Note: This chart was prepared using voice recognition software and may contain unintended word substitution errors.     Susu Cameron RN, NP   [facility] Hospitalist Team   3/15/2024      SEE ATTENDING NOTE BELOW      Patient examined and assessed independently. Agree with above APN assessment.     Hospital course: Deneen Chapa is a 59 year old male with PMH significant for CHF-diastolic, insomnia, migraines, afib, secondary hypercoagulable state, insomnia, HTN, HL, DM- diet controlled, AYANNA, morbid obesity with recent admission to [facility] with  a mechanical fall with B/L Knee Pain  found to have a right tibial plateau fx and possible Left MCL strain who presents from [facility] with cough, sob and fevers found to have metapneumovirus and possible pna. Hospital course complicated by fibrillation with rapid ventricular response, HR improved with adjustment of cardiac meds. Metformin added for DM with HgbA1c of 7.5. pt needs wt loss endeavors. Pt has follow up with ortho on 3/18 as previously scheduled. Pt discharged to [facility], see below for details     Objective  /77 (BP Location: Right arm)   Pulse 81   Temp 98.1 °F (36.7 °C) (Oral)   Resp 20   Ht 5' 10\" (1.778 m)   Wt (!) 472 lb 8 oz (214.3  kg)   SpO2 90%   BMI 67.80 kg/m²     Exam:  GEN: morbidly obese male in NAD  HEENT: EOMI  Pulm: CTAB, no crackles or wheezes  CV: RRR, no murmurs  ABD: Soft, non-tender, non-distended, +BS  SKIN: warm, dry  EXT: + edema      Assessment/Plan     Deneen Chapa is a 59 year old male with PMH significant for CHF-diastolic, insomnia, migraines, afib, secondary hypercoagulable state, insomnia, HTN, HL, DM- diet controlled, AYANNA, morbid obesity with recent admission to [facility] with  a mechanical fall with B/L Knee Pain  found to have a right tibial plateau fx and possible Left MCL strain who presents from [facility] with cough, sob and fevers found to have metapneumovirus and possible pna. Hospital course complicated by fibrillation with rapid ventricular response, HR improved with adjustment of cardiac meds. Metformin added for DM with HgbA1c of 7.5. pt needs wt loss endeavors. Pt has follow up with ortho on 3/18 as previously scheduled. Plans for return to [facility] for [facility] oce insurance auth obtained, see below for details      Fever/Cough/SOB 2/2 Metapneumovirus possible PNA   Acute on Chronic CHF, diastolic   Persistent Atrial fibrillation with RVR  Secondary Hypercoagulable State  HTN  HL-not on meds  Mechanical fall w/ shannon knee pain  Right Tibial Fx  Possible Left MCL Sprain   DM Type II  AYANNA  Insomnia   Migraines  Dizziness   Morbid Obesity  GO        Plan  - stable for discharge to SNF today     Rest as above.     Yoselyn Mota MD  [facility] hospitalist

## 2024-03-14 NOTE — RESPIRATORY THERAPY NOTE
Patient refused  CPAP therapy. [facility] has educated the patient on the purpose of and need for this therapy as well as potential negative outcomes associated with deferring treatment. Despite education, patient maintains refusal.

## 2024-03-14 NOTE — DISCHARGE INSTRUCTIONS
Complete course of oral antibiotics for pneumonia    Pt has diabetes with HgbA1C of 7.6,. recommend wt loss and ada diet. Metformin 500 mg daily added plus ssi prn, adjust as needed    Pt has follow up ortho appt on 3/18 in [place] as previously scheduled

## 2024-03-14 NOTE — PLAN OF CARE
IV abx completed per MAR, RA, HR controlled overnight. No acute events overnight.   Problem: Patient Centered Care  Goal: Patient preferences are identified and integrated in the patient's plan of care  Description: Interventions:  - What would you like us to know as we care for you? From [facility]  - Provide timely, complete, and accurate information to patient/family  - Incorporate patient and family knowledge, values, beliefs, and cultural backgrounds into the planning and delivery of care  - Encourage patient/family to participate in care and decision-making at the level they choose  - Honor patient and family perspectives and choices  Outcome: Progressing     Problem: Patient/Family Goals  Goal: Patient/Family Long Term Goal  Description: Patient's Long Term Goal: discharge    Interventions:  - Monitor vital signs, labs, test results  - Monitor blood glucose levels  - Oxygen and respiratory therapy as needed  - Pain management  - Follow MD orders  - Administer medications per MD order  - Diagnostics per order  - Update /  inform patient on plan of care  - Discharge planning  - See additional Care Plan goals for specific interventions  Outcome: Progressing  Goal: Patient/Family Short Term Goal  Description: Patient's Short Term Goal: to breath better and resolve cough    Interventions:   - Monitor vital signs, labs, test results  - Oxygen and respiratory therapy as needed  - Follow MD orders  - Administer medications per MD order    - See additional Care Plan goals for specific interventions  Outcome: Progressing     Problem: CARDIOVASCULAR - ADULT  Goal: Maintains optimal cardiac output and hemodynamic stability  Description: INTERVENTIONS:  - Monitor vital signs, rhythm, and trends  - Monitor for bleeding, hypotension and signs of decreased cardiac output  - Evaluate effectiveness of vasoactive medications to optimize hemodynamic stability  - Monitor arterial and/or venous puncture sites for  bleeding and/or hematoma  - Assess quality of pulses, skin color and temperature  - Assess for signs of decreased coronary artery perfusion - ex. Angina  - Evaluate fluid balance, assess for edema, trend weights  Outcome: Progressing  Goal: Absence of cardiac arrhythmias or at baseline  Description: INTERVENTIONS:  - Continuous cardiac monitoring, monitor vital signs, obtain 12 lead EKG if indicated  - Evaluate effectiveness of antiarrhythmic and heart rate control medications as ordered  - Initiate emergency measures for life threatening arrhythmias  - Monitor electrolytes and administer replacement therapy as ordered  Outcome: Progressing     Problem: RESPIRATORY - ADULT  Goal: Achieves optimal ventilation and oxygenation  Description: INTERVENTIONS:  - Assess for changes in respiratory status  - Assess for changes in mentation and behavior  - Position to facilitate oxygenation and minimize respiratory effort  - Oxygen supplementation based on oxygen saturation or ABGs  - Provide Smoking Cessation handout, if applicable  - Encourage broncho-pulmonary hygiene including cough, deep breathe, Incentive Spirometry  - Assess the need for suctioning and perform as needed  - Assess and instruct to report SOB or any respiratory difficulty  - Respiratory Therapy support as indicated  - Manage/alleviate anxiety  - Monitor for signs/symptoms of CO2 retention  Outcome: Progressing     Problem: METABOLIC/FLUID AND ELECTROLYTES - ADULT  Goal: Glucose maintained within prescribed range  Description: INTERVENTIONS:  - Monitor Blood Glucose as ordered  - Assess for signs and symptoms of hyperglycemia and hypoglycemia  - Administer ordered medications to maintain glucose within target range  - Assess barriers to adequate nutritional intake and initiate nutrition consult as needed  - Instruct patient on self management of diabetes  Outcome: Progressing  Goal: Electrolytes maintained within normal limits  Description:  INTERVENTIONS:  - Monitor labs and rhythm and assess patient for signs and symptoms of electrolyte imbalances  - Administer electrolyte replacement as ordered  - Monitor response to electrolyte replacements, including rhythm and repeat lab results as appropriate  - Fluid restriction as ordered  - Instruct patient on fluid and nutrition restrictions as appropriate  Outcome: Progressing  Goal: Hemodynamic stability and optimal renal function maintained  Description: INTERVENTIONS:  - Monitor labs and assess for signs and symptoms of volume excess or deficit  - Monitor intake, output and patient weight  - Monitor urine specific gravity, serum osmolarity and serum sodium as indicated or ordered  - Monitor response to interventions for patient's volume status, including labs, urine output, blood pressure (other measures as available)  - Encourage oral intake as appropriate  - Instruct patient on fluid and nutrition restrictions as appropriate  Outcome: Progressing     Problem: SKIN/TISSUE INTEGRITY - ADULT  Goal: Skin integrity remains intact  Description: INTERVENTIONS  - Assess and document risk factors for pressure ulcer development  - Assess and document skin integrity  - Monitor for areas of redness and/or skin breakdown  - Initiate interventions, skin care algorithm/standards of care as needed  Outcome: Progressing     Problem: Impaired Functional Mobility  Goal: Achieve highest/safest level of mobility/gait  Description: Interventions:  - Assess patient's functional ability and stability  - Promote increasing activity/tolerance for mobility and gait  - Educate and engage patient/family in tolerated activity level and precautions  - Recommend use of sit-stand lift for transfers  Outcome: Progressing

## 2024-03-14 NOTE — CM/SW NOTE
Pt has been cleared for discharge.    JHONATAN met with pt bedside to explain that he has been cleared and auth has been submitted for [facility].    Pt was upset and asked why he cannot discharge to a [facility] contracted facility.  JHONATAN explained that his clinicals were sent to 19 [facility] providers and there were no accepting providers due to care exceeding their capacity.    JHONATAN spoke to Wendi from the VA transfer center who stated he would not need auth to go to the [facility] DOUG unit as it is a government entity and they bill Medicare directly.  Wendi sent clinicals to the [facility] DOUG unit and will follow-up with JHONATAN.    Pt stated he will appeal discharge if he cannot go to a VA DOUG facility or the [facility] DOUG unit.  SW explained his right to do so.    [facility] JHONATAN [facility] updated on plan.  Viry direct contact is [phone]    Update 1:30 PM    JHONATAN spoke to Wendi and pt has been denied from D[facility]G unit at the [facility] due to needs exceeding their capacity.  Per Wendi, this should be escalated to VA supervisor.    Update 3:00 PM    JHONATAN spoke to [facility] JHONATAN [facility].  JHONATAN explained to Viry 38 referrals have been sent with no accepting DOUG.  Viry v/u and stated she does not know what else she can do for pt either.    JHONATAN explained Medicare appeal process/timeline to Viry.  Viry stated pt can keep working with her/VA for services to which JHONATAN stated was already told to the pt.    JHONATAN met with pt bedside.  Pt wanted referral sent to [facility].  JHONATAN spoke to Daphney who is going to have facility review referral.    Auth has been obtained by [facility].  Pt does want to go home- has a hospital bed but no immobilizers.  Per Viry at VA she is unsure about this but it will take time.  Viry stated pt qualifies for 11 hours per week but it will take a few weeks for that to be set up.  Viry stated if home is the plan. Hospital should set up HHC as it takes two weeks for VA home health care to be set up.    JHONATAN spoke to pt bedside.  JHONATAN explained to pt that the  recommendation from his care team is rehab as it is approved under insurance and pt can be set up for home from there.  SW stated she is worried about him going home.  Pt declining stating he would prefer to go home as he lays in the bed at rehab and feels he can manage with home health care and caregivers.  Pt does have a hospital bed.  Pt does not have a walker or wheelchair- declining wheelchair but does want a walker.  Pt needs a bariatric commode and is asking for a bedpan.    Pt stated he is agreeable to use [facility] if they can accept.  Referrals in Aidin/face to face uploaded.    DME referrals sent in Aidin to [facility] as SW knows they carry bariatric equipment.    Pt stated he called [facility] and they can start tomorrow or Saturday.  Pt is agreeable to discharge tomorrow morning.    JHONATAN spoke to Yuliana who stated [facility] is verifying if they can accommodate pt/his VA benefits.  Yuliana stated no auth needed for rehab if pt is approved.    PLAN: DC home w/home health care and [facility] vs. [facility] D[facility]    / to remain available for support and/or discharge planning.     Marivel Padron MSW, LSW [id number]

## 2024-03-14 NOTE — PROGRESS NOTES
[facility] AND CARE   Progress Note  -  Deneen Chapa Patient Status:  Inpatient    1965 MRN [id number]   Location [facility]5W Attending Johny Prince,    Hosp Day # 3 PCP Jenna Gutierrez MD     PCP: Jenna Gutierrez MD      SEE ATTENDING NOTE AT BOTTOM OF PAGE    Is this a shared or split note between Advanced Practice Provider and Physician? Yes    Assessment and Plan:  Deneen Chapa is a 59 year old male with PMH significant for CHF-diastolic, insomnia, migraines, afib, secondary hypercoagulable state, insomnia, HTN, HL, DM- diet controlled, AYANNA, morbid obesity with recent admission to [facility] with  a mechanical fall with B/L Knee Pain  found to have a right tibial plateau fx and possible Left MCL strain who presents from [facility] with cough, sob and fevers found to have metapneumovirus and possible pna. Hospital course complicated by fibrillation with rapid ventricular response, HR improved with adjustment of cardiac meds. Metformin added for DM with HgbA1c of 7.5. pt needs wt loss endeavors. Pt has follow up with ortho on 3/18 as previously scheduled. Plans for return to [facility] for [facility] oce insurance auth obtained, see below for details      Fever/Cough/SOB 2/2 Metapneumovirus possible PNA   -tmax 100.8. WBC normal. PCT normal   -CXR preliminary with consolidation Left lung   -negative for influenza, covid and RSV  -blood cx NGTD  -RVP + for metapneumovirus   -MRSA screen negative   -prn anti tussives  -abx- completed zithromax, ceftin to complete 5 day course   -on RA    Acute on Chronic CHF, diastolic   Persistent Atrial fibrillation with RVR  Secondary Hypercoagulable State  HTN  HL-not on meds  - echo with EF 45-50%  -CXR not suggestive of CHF but noted LE edema. BNP 17  -continue home meds-asa,lasix, Toprol and xarelto   -Cardizem CD changed to cardizem  mg every 6 hours add digoxin 0.125 mg daily  -holding home lisinopril with lower blood pressures.    Mechanical fall w/ shannon  knee pain  Right Tibial Fx  Possible Left MCL Sprain   -work up at [facility] 1)CT of right knee-mildly comminuted and minimally displaced intra-articular fracture along the lateral tibial plateau 2) unable to get MRI's of knees do to wt 3) r/o for vascular disease with bernice  -pain meds-pt currently using tramadol and tylenol   -per [facility] notes- NWB RLE (can do active and passive ROM in NWB status) and WBAT to LLE, needs to wear immobilizers on right at all times, may remove mobilizers on left leg when in bed  -follow up with Dr Cook,appt 3/18 scheduled     DM Type II  -HgbA1C  7.6  -metformin 500 mg daily added   -SSI prn  -accuchecks  -ADA diet     AYANNA  -cpap     Insomnia   -continue home meds-currently only taking trazodone     Migraines  Dizziness   -per med rec no longer taking topiramate, qulipta or eletriptan  -follows with Dr Acevedo-neurology      Morbid Obesity  -PER [facility] notes--he briefly tried metformin, has not tried any new meds or d/w PCP. Encourage a strict change in diet and if possible/qualifies/affords new injectable wt loss meds such as ozempic/wegovy/mounjaro  -recommend follow up with pcp      [facility]  -full code  -POA father      MA/PARISA Reach  -Re- Entry: 2/16 discharge from [facility] to [facility]  -Consults: none  -Discharge Needs: return to [facility] at [facility]  -Appointments: follow up Dr Jenna Gutierrez MD within 1 week of discharge from rehab      [facility] in am  -diet-ADA     Prophy  -SCD  -xarelto      Dispo  -medically ready for discharge to rehab once insurance auth obtained   PCP: Jenna Gutierrez MD        Outpatient records or previous hospital records reviewed.   Further recommendations pending patient's clinical course.  [facility] hospitalist  team to continue to follow patient while in house  Concerns regarding plan of care were discussed with patient. Patient agrees with plan as detailed above. Discussed plan of care with Dr. Prince      Note: This chart was prepared using voice recognition software and may  contain unintended word substitution errors.      Susu Cameron RN, NP  [facility] and Care Hospitalist Team   Available via Perfect Serve or Bubble Chat (check Availability)    SUBJECTIVE:   No fevers. HR improved. No le edema. He tells me he is supposed to be going to a [facility] facility, asked SW to see pt      OBJECTIVE:   Blood pressure 137/82, pulse 80, temperature 98.4 °F (36.9 °C), temperature source Oral, resp. rate 18, height 5' 10\" (1.778 m), weight (!) 464 lb (210.5 kg), SpO2 93%.    GENERAL: no apparent distress, overweight  NEURO: A/A Ox3  RESP: non labored, CTA  CARDIO: irregular   ABD: soft, NT, ND, BS+  EXTREMITIES: right leg brace, improved edema to left leg     DIAGNOSTIC DATA:   Labs:     Recent Labs   Lab 03/11/24  0633 03/12/24  0521 03/13/24  0537 03/14/24  0456   WBC 5.6 5.3 4.9 5.0   HGB 14.4 14.5 14.6 13.9   MCV 93.6 91.3 88.6 91.1   .0 141.0* 141.0* 161.0       Recent Labs   Lab 03/11/24  0633 03/12/24  0656 03/13/24  0537 03/13/24  1448 03/14/24  0456   * 134* 139  --  137   K 4.1 4.0 3.6 3.9 4.2    105 105  --  106   CO2 26.0 25.0 26.0  --  24.0   BUN 13 11 13  --  11   CREATSERUM 0.84 0.74 0.74  --  0.69*   CA 9.1 9.0 8.7  --  8.9   * 121* 114*  --  106*       No results for input(s): \"ALT\", \"AST\", \"ALB\", \"AMYLASE\", \"LIPASE\", \"LDH\" in the last 168 hours.    Invalid input(s): \"ALPHOS\", \"TBIL\", \"DBIL\", \"TPROT\"    Recent Labs   Lab 03/13/24  0729 03/13/24  1140 03/13/24  1702 03/13/24  2219 03/14/24  0719   PGLU 108* 113* 123* 118* 99       No results for input(s): \"TROP\" in the last 168 hours.        MEDICATIONS       digoxin  125 mcg Oral Daily    furosemide  40 mg Oral Daily    dilTIAZem  120 mg Oral 4 times per day    cefuroxime  500 mg Oral 2 times per day    aspirin  81 mg Oral Daily    metoprolol succinate ER  100 mg Oral BID    rivaroxaban  20 mg Oral Daily with food    traZODone  200 mg Oral Nightly    insulin aspart  1-5 Units Subcutaneous TID CC        HYDROcodone-acetaminophen, ondansetron, prochlorperazine, bisacodyl, fleet enema, benzonatate, acetaminophen, cyclobenzaprine, guaiFENesin, polyethylene glycol (PEG 3350), sennosides, traMADol, ipratropium-albuterol, glucose **OR** glucose **OR** glucose-vitamin C **OR** dextrose **OR** glucose **OR** glucose **OR** glucose-vitamin C    Susu Cameron, HILTON      IMAGING     No results found.      SEE ATTENDING NOTE BELOW:     Patient examined and assessed independently. Agree with above APN assessment.     S: No new complaints, [facility] to [facility]    Objective  /82 (BP Location: Right arm)   Pulse 80   Temp 98.4 °F (36.9 °C) (Oral)   Resp 18   Ht 5' 10\" (1.778 m)   Wt (!) 464 lb (210.5 kg)   SpO2 93%   BMI 66.58 kg/m²     Exam:  GEN: morbidly obese male in NAD  HEENT: EOMI  Pulm: CTAB, no crackles or wheezes  CV: RRR, no murmurs  ABD: Soft, non-tender, non-distended, +BS  SKIN: warm, dry  EXT: + edema     Assessment/Plan      Deneen Chapa is a 59 year old male with PMH significant for CHF-diastolic, insomnia, migraines, afib, secondary hypercoagulable state, insomnia, HTN, HL, DM- diet controlled, AYANNA, morbid obesity with recent admission to [facility] with  a mechanical fall with B/L Knee Pain  found to have a right tibial plateau fx and possible Left MCL strain who presents from [facility] with cough, sob and fevers found to have metapneumovirus and possible pna. Hospital course complicated by fibrillation with rapid ventricular response, HR improved with adjustment of cardiac meds. Metformin added for DM with HgbA1c of 7.5. pt needs wt loss endeavors. Pt has follow up with ortho on 3/18 as previously scheduled. Plans for return to [facility] for [facility] oce insurance auth obtained, see below for details      Fever/Cough/SOB 2/2 Metapneumovirus possible PNA   Acute on Chronic CHF, diastolic   Persistent Atrial fibrillation with RVR  Secondary Hypercoagulable State  HTN  HL-not on  meds  Mechanical fall w/ shannon knee pain  Right Tibial Fx  Possible Left MCL Sprain   DM Type II  AYANNA  Insomnia   Migraines  Dizziness   Morbid Obesity  GOC      Plan  - stable for discharge to SNF  - patient [facility] to [facility]  - continue diltiazem and digoxin    Rest as above.    Yoselyn Mota MD  [facility] hospitalist

## 2024-03-15 VITALS
BODY MASS INDEX: 45.1 KG/M2 | DIASTOLIC BLOOD PRESSURE: 77 MMHG | OXYGEN SATURATION: 90 % | WEIGHT: 315 LBS | HEIGHT: 70 IN | HEART RATE: 86 BPM | RESPIRATION RATE: 20 BRPM | SYSTOLIC BLOOD PRESSURE: 120 MMHG | TEMPERATURE: 98 F

## 2024-03-15 LAB
ANION GAP SERPL CALC-SCNC: 6 MMOL/L (ref 0–18)
BASOPHILS # BLD AUTO: 0.04 X10(3) UL (ref 0–0.2)
BASOPHILS NFR BLD AUTO: 0.9 %
BUN BLD-MCNC: 13 MG/DL (ref 9–23)
BUN/CREAT SERPL: 19.1 (ref 10–20)
CALCIUM BLD-MCNC: 8.8 MG/DL (ref 8.7–10.4)
CHLORIDE SERPL-SCNC: 106 MMOL/L (ref 98–112)
CO2 SERPL-SCNC: 26 MMOL/L (ref 21–32)
CREAT BLD-MCNC: 0.68 MG/DL
DEPRECATED RDW RBC AUTO: 43.3 FL (ref 35.1–46.3)
EGFRCR SERPLBLD CKD-EPI 2021: 106 ML/MIN/1.73M2 (ref 60–?)
EOSINOPHIL # BLD AUTO: 0.13 X10(3) UL (ref 0–0.7)
EOSINOPHIL NFR BLD AUTO: 2.9 %
ERYTHROCYTE [DISTWIDTH] IN BLOOD BY AUTOMATED COUNT: 13.2 % (ref 11–15)
GLUCOSE BLD-MCNC: 107 MG/DL (ref 70–99)
GLUCOSE BLDC GLUCOMTR-MCNC: 117 MG/DL (ref 70–99)
GLUCOSE BLDC GLUCOMTR-MCNC: 96 MG/DL (ref 70–99)
HCT VFR BLD AUTO: 41.2 %
HGB BLD-MCNC: 13.9 G/DL
IMM GRANULOCYTES # BLD AUTO: 0.04 X10(3) UL (ref 0–1)
IMM GRANULOCYTES NFR BLD: 0.9 %
LYMPHOCYTES # BLD AUTO: 1.86 X10(3) UL (ref 1–4)
LYMPHOCYTES NFR BLD AUTO: 41.1 %
MCH RBC QN AUTO: 30.3 PG (ref 26–34)
MCHC RBC AUTO-ENTMCNC: 33.7 G/DL (ref 31–37)
MCV RBC AUTO: 90 FL
MONOCYTES # BLD AUTO: 0.49 X10(3) UL (ref 0.1–1)
MONOCYTES NFR BLD AUTO: 10.8 %
NEUTROPHILS # BLD AUTO: 1.97 X10 (3) UL (ref 1.5–7.7)
NEUTROPHILS # BLD AUTO: 1.97 X10(3) UL (ref 1.5–7.7)
NEUTROPHILS NFR BLD AUTO: 43.4 %
OSMOLALITY SERPL CALC.SUM OF ELEC: 287 MOSM/KG (ref 275–295)
PLATELET # BLD AUTO: 163 10(3)UL (ref 150–450)
POTASSIUM SERPL-SCNC: 4 MMOL/L (ref 3.5–5.1)
RBC # BLD AUTO: 4.58 X10(6)UL
SODIUM SERPL-SCNC: 138 MMOL/L (ref 136–145)
WBC # BLD AUTO: 4.5 X10(3) UL (ref 4–11)

## 2024-03-15 PROCEDURE — 82962 GLUCOSE BLOOD TEST: CPT

## 2024-03-15 PROCEDURE — 97530 THERAPEUTIC ACTIVITIES: CPT

## 2024-03-15 PROCEDURE — 80048 BASIC METABOLIC PNL TOTAL CA: CPT | Performed by: NURSE PRACTITIONER

## 2024-03-15 PROCEDURE — 85025 COMPLETE CBC W/AUTO DIFF WBC: CPT | Performed by: NURSE PRACTITIONER

## 2024-03-15 PROCEDURE — 94640 AIRWAY INHALATION TREATMENT: CPT

## 2024-03-15 RX ORDER — CEFUROXIME AXETIL 500 MG/1
500 TABLET ORAL EVERY 12 HOURS SCHEDULED
Qty: 3 TABLET | Refills: 0 | Status: SHIPPED | OUTPATIENT
Start: 2024-03-16 | End: 2024-03-18

## 2024-03-15 NOTE — CM/SW NOTE
03/15/24 1210   Discharge disposition   Expected discharge disposition subacute   Post Acute Care Provider [facility] in   Discharge transportation [facility]     Pt discussed during nursing rounds. Pt is stable for dc today. MD dc order entered.  No home care needs identified on dc.     Plan: Home today   / to remain available for support and/or discharge planning.   JESUS ALBERTO DrakeN    [phone]

## 2024-03-15 NOTE — PLAN OF CARE
Problem: Patient Centered Care  Goal: Patient preferences are identified and integrated in the patient's plan of care  Description: Interventions:  - What would you like us to know as we care for you? From [facility]  - Provide timely, complete, and accurate information to patient/family  - Incorporate patient and family knowledge, values, beliefs, and cultural backgrounds into the planning and delivery of care  - Encourage patient/family to participate in care and decision-making at the level they choose  - Honor patient and family perspectives and choices  Outcome: Progressing     Problem: Patient/Family Goals  Goal: Patient/Family Long Term Goal  Description: Patient's Long Term Goal: discharge    Interventions:  - Monitor vital signs, labs, test results  - Monitor blood glucose levels  - Oxygen and respiratory therapy as needed  - Pain management  - Follow MD orders  - Administer medications per MD order  - Diagnostics per order  - Update /  inform patient on plan of care  - Discharge planning  - See additional Care Plan goals for specific interventions  Outcome: Progressing  Goal: Patient/Family Short Term Goal  Description: Patient's Short Term Goal: to breath better and resolve cough    Interventions:   - Monitor vital signs, labs, test results  - Oxygen and respiratory therapy as needed  - Follow MD orders  - Administer medications per MD order    - See additional Care Plan goals for specific interventions  Outcome: Progressing     Problem: CARDIOVASCULAR - ADULT  Goal: Maintains optimal cardiac output and hemodynamic stability  Description: INTERVENTIONS:  - Monitor vital signs, rhythm, and trends  - Monitor for bleeding, hypotension and signs of decreased cardiac output  - Evaluate effectiveness of vasoactive medications to optimize hemodynamic stability  - Monitor arterial and/or venous puncture sites for bleeding and/or hematoma  - Assess quality of pulses, skin color and temperature  -  Assess for signs of decreased coronary artery perfusion - ex. Angina  - Evaluate fluid balance, assess for edema, trend weights  Outcome: Progressing  Goal: Absence of cardiac arrhythmias or at baseline  Description: INTERVENTIONS:  - Continuous cardiac monitoring, monitor vital signs, obtain 12 lead EKG if indicated  - Evaluate effectiveness of antiarrhythmic and heart rate control medications as ordered  - Initiate emergency measures for life threatening arrhythmias  - Monitor electrolytes and administer replacement therapy as ordered  Outcome: Progressing     Problem: RESPIRATORY - ADULT  Goal: Achieves optimal ventilation and oxygenation  Description: INTERVENTIONS:  - Assess for changes in respiratory status  - Assess for changes in mentation and behavior  - Position to facilitate oxygenation and minimize respiratory effort  - Oxygen supplementation based on oxygen saturation or ABGs  - Provide Smoking Cessation handout, if applicable  - Encourage broncho-pulmonary hygiene including cough, deep breathe, Incentive Spirometry  - Assess the need for suctioning and perform as needed  - Assess and instruct to report SOB or any respiratory difficulty  - Respiratory Therapy support as indicated  - Manage/alleviate anxiety  - Monitor for signs/symptoms of CO2 retention  Outcome: Progressing     Problem: METABOLIC/FLUID AND ELECTROLYTES - ADULT  Goal: Glucose maintained within prescribed range  Description: INTERVENTIONS:  - Monitor Blood Glucose as ordered  - Assess for signs and symptoms of hyperglycemia and hypoglycemia  - Administer ordered medications to maintain glucose within target range  - Assess barriers to adequate nutritional intake and initiate nutrition consult as needed  - Instruct patient on self management of diabetes  Outcome: Progressing  Goal: Electrolytes maintained within normal limits  Description: INTERVENTIONS:  - Monitor labs and rhythm and assess patient for signs and symptoms of electrolyte  imbalances  - Administer electrolyte replacement as ordered  - Monitor response to electrolyte replacements, including rhythm and repeat lab results as appropriate  - Fluid restriction as ordered  - Instruct patient on fluid and nutrition restrictions as appropriate  Outcome: Progressing  Goal: Hemodynamic stability and optimal renal function maintained  Description: INTERVENTIONS:  - Monitor labs and assess for signs and symptoms of volume excess or deficit  - Monitor intake, output and patient weight  - Monitor urine specific gravity, serum osmolarity and serum sodium as indicated or ordered  - Monitor response to interventions for patient's volume status, including labs, urine output, blood pressure (other measures as available)  - Encourage oral intake as appropriate  - Instruct patient on fluid and nutrition restrictions as appropriate  Outcome: Progressing     Problem: SKIN/TISSUE INTEGRITY - ADULT  Goal: Skin integrity remains intact  Description: INTERVENTIONS  - Assess and document risk factors for pressure ulcer development  - Assess and document skin integrity  - Monitor for areas of redness and/or skin breakdown  - Initiate interventions, skin care algorithm/standards of care as needed  Outcome: Progressing     Problem: Impaired Functional Mobility  Goal: Achieve highest/safest level of mobility/gait  Description: Interventions:  - Assess patient's functional ability and stability  - Promote increasing activity/tolerance for mobility and gait  - Educate and engage patient/family in tolerated activity level and precautions  Outcome: Progressing   No acute events overnight. Plan for [facility] vs. [facility]. [facility] on discharge.

## 2024-03-15 NOTE — PHYSICAL THERAPY NOTE
h/o fall with admit to [facility] 2/10-2/16/24 with resultant right tibial plateau fx, left MCL sprain, went to [facility] at [place]. h/o DM, afib, CHF, obesity

## 2024-03-15 NOTE — PHYSICAL THERAPY NOTE
PHYSICAL THERAPY TREATMENT NOTE - INPATIENT     Room Number: 519/519-A       Presenting Problem: pneumonia  Co-Morbidities : Hx migraines, a fib, DM2  h/o fall with admit to [facility] 2/10-2/16/24 with resultant right tibial plateau fx, left MCL sprain, went to [facility] at [place]. h/o DM, afib, CHF, obesity      Problem List  Active Problems:    Acute bronchitis due to human metapneumovirus      PHYSICAL THERAPY ASSESSMENT   Patient demonstrates fair progress this session, goals  remain in progress.    Patient continues to function below baseline with bed mobility, transfers, and gait.  Contributing factors to remaining limitations include decreased functional strength, decreased endurance/aerobic capacity, and medical status.  Next session anticipate patient to progress transfers and gait.  Physical Therapy will continue to follow patient for duration of hospitalization.    Patient continues to benefit from continued skilled PT services: to promote return to prior level of function and safety with continuous assistance and gradual rehabilitative therapy .    PLAN  PT Treatment Plan: Bed mobility;Endurance;Energy conservation;Patient education;Strengthening;Range of motion;Transfer training  Frequency (Obs): 3-5x/week    SUBJECTIVE  Pt reports he will be going to rehab  \"I can't stand with this brace on\" referring to knee immobilizer, pt is also NWB right LE    OBJECTIVE  Precautions: Knee immobilizer (R KI at all times, okay to remove L KI)    WEIGHT BEARING RESTRICTION        R Lower Extremity: Non-Weight Bearing  L Lower Extremity: Weight Bearing as Tolerated    PAIN ASSESSMENT   Rating: Unable to rate  Location: right LE with activity  Management Techniques: Repositioning    BALANCE  Static Sitting: Good  Dynamic Sitting: Good  Static Standing: Dependent  Dynamic Standing: Not tested    ACTIVITY TOLERANCE                          O2 WALK       AM-PAC '6-Clicks' INPATIENT SHORT FORM - BASIC MOBILITY  How much  difficulty does the patient currently have...  Patient Difficulty: Turning over in bed (including adjusting bedclothes, sheets and blankets)?: A Little   Patient Difficulty: Sitting down on and standing up from a chair with arms (e.g., wheelchair, bedside commode, etc.): Unable   Patient Difficulty: Moving from lying on back to sitting on the side of the bed?: A Little   How much help from another person does the patient currently need...   Help from Another: Moving to and from a bed to a chair (including a wheelchair)?: Total   Help from Another: Need to walk in hospital room?: Total   Help from Another: Climbing 3-5 steps with a railing?: Total     AM-PAC Score:  Raw Score: 10   Approx Degree of Impairment: 76.75%   Standardized Score (AM-PAC Scale): 32.29   CMS Modifier (G-Code): CL    FUNCTIONAL ABILITY STATUS  Functional Mobility/Gait Assessment  Gait Assistance: Not tested (unable to come to full standing NWB right LE)  Distance (ft): na  Rolling: min a  Supine to Sit: contact guard assist  Sit to Supine: minimal assist  Sit to Stand:  attempted, pt unable to come to full standing, NWB right LE with KI in place    Additional information: Pt ok to see per rn, pt recd in supine, educated in role of PT, goals for session, importance of consistent mobility.    Pt is alert and oriented x 4, able to follow all commands. Pt performed functional mobility indicated above.  As pt unable to come to full standing with attempts, worked on seated pushing up strengthening bilat UE, left LE. Pt with good tolerance for sitting, UE work.    Pt returned to supine end of session, made comfortable.  KI remained in place. Pt issued theraband for UE therex, OT addressing education.     The patient's Approx Degree of Impairment: 76.75% has been calculated based on documentation in the [facility] '6 clicks' Inpatient Daily Activity Short Form.  Research supports that patients with this level of impairment may benefit from rehab.    Final  disposition will be made by interdisciplinary medical team.      Patient End of Session: In bed;Needs met;Call light within reach;RN aware of session/findings;All patient questions and concerns addressed    CURRENT GOALS   Goals to be met by: 3/30/24  Patient Goal Patient's self-stated goal is: to be able to stand, walk   Goal #1 Patient is able to demonstrate supine - sit EOB @ level: modified independent      Goal #1   Current Status     Goal #2 Patient is able to demonstrate transfers Sit to/from Stand at assistance level: moderate assistance with walker - rolling NWB right LE      Goal #2  Current Status     Goal #3 Patient is able to ambulate 3 feet with assist device: walker - rolling at assistance level: moderate assistance   Goal #3   Current Status     Goal #4     Goal #4   Current Status     Goal #5 Patient to demonstrate independence with home activity/exercise instructions provided to patient in preparation for discharge.   Goal #5   Current Status     Goal #6     Goal #6  Current Status      Therapeutic Activity: 30 minutes

## 2024-03-15 NOTE — OCCUPATIONAL THERAPY NOTE
OCCUPATIONAL THERAPY TREATMENT NOTE - INPATIENT        Room Number: 519/519-A     Presenting Problem: hospital acquired PNA, sustained R tibial fx and L MCL sprain after fall, R LE NWB with KI at all times (AROM/PROM okay), L LE WBAT (okay to remove immobilizer)    Problem List  Active Problems:    Acute bronchitis due to human metapneumovirus      OCCUPATIONAL THERAPY ASSESSMENT   Patient demonstrates limited progress this session, goals remain in progress.    Patient continues to function below baseline with ADLs and fx mobility/transfers.   Contributing factors to remaining limitations include decreased functional strength, decreased functional reach, decreased endurance, pain, impaired balance, decreased muscular endurance, difficulty maintaining precautions, and body habitus. Next session anticipate patient to progress transfers, static standing balance, and functional standing tolerance.  Occupational Therapy will continue to follow patient for duration of hospitalization.    Patient continues to benefit from continued skilled OT services: to promote return to prior level of function and safety with continuous assistance and gradual rehabilitative therapy . Remains unsafe to return home at this time.    PLAN  OT Treatment Plan: Balance activities;Energy conservation/work simplification techniques;ADL training;Functional transfer training;Endurance training;Patient/Family education;Patient/Family training;Equipment eval/education;Compensatory technique education  OT Device Recommendations: TBD    SUBJECTIVE  \"I'm sorry I couldn't do it.\"    OBJECTIVE  Precautions: Knee immobilizer (R KI at all times, okay to remove L KI)    WEIGHT BEARING RESTRICTION  R Lower Extremity: Non-Weight Bearing  L Lower Extremity: Weight Bearing as Tolerated    PAIN ASSESSMENT  Rating: -- (not rated)  Location: R LE  Management Techniques: Activity promotion; Body mechanics; Repositioning    ACTIVITIES OF DAILY LIVING  ASSESSMENT  AM-PAC ‘6-Clicks’ Inpatient Daily Activity Short Form  How much help from another person does the patient currently need…  -   Putting on and taking off regular lower body clothing?: A Lot  -   Bathing (including washing, rinsing, drying)?: A Lot  -   Toileting, which includes using toilet, bedpan or urinal? : Total  -   Putting on and taking off regular upper body clothing?: A Lot  -   Taking care of personal grooming such as brushing teeth?: A Little  -   Eating meals?: A Little    AM-PAC Score:  Score: 13  Approx Degree of Impairment: 63.03%  Standardized Score (AM-PAC Scale): 32.03  CMS Modifier (G-Code): CL    BED MOBILITY  Supine to Sit: SUP   Rolling: SUP to facilitate changing of linens    FUNCTIONAL TRANSFER ASSESSMENT  Attempted Sit to Stand from EOB: Unable to perform while adhering to R LE NWB and wearing KI  10x push-ups from EOB. Patient unable to clear bottom from bed while adhering to R LE NWB.    FUNCTIONAL ADL ASSESSMENT  UB Dressing: setup assist for donning gown while seated at EOB  LB Dressing: max assist for donning R KI at bed-level    EDUCATION PROVIDED  Patient: Role of Occupational Therapy; Plan of Care; Discharge Recommendations; Functional Transfer Techniques; Fall Prevention; Posture/Positioning; Proper Body Mechanics  Patient's Response to Education: Verbalized Understanding; Returned Demonstration    The patient's Approx Degree of Impairment: 63.03% has been calculated based on documentation in the [facility] '6 clicks' Inpatient Daily Activity Short Form.  Research supports that patients with this level of impairment may benefit from rehab.  Final disposition will be made by interdisciplinary medical team.    Patient End of Session: Up in chair;Call light within reach;Needs met;RN aware of session/findings;All patient questions and concerns addressed;Bracing education provided per handout    OT Goals:  Patient self-stated goal is: none stated     Patient will complete  functional transfer with Max A x1  Comment: ongoing    Patient will tolerate standing for 1-2 min in prep for adls with Max A x1  Comment: ongoing    Patient will complete oral/facial grooming task in supported sitting with Setup Assist  Comment: MET            Goals  on: 3/27/24  Frequency: 3-5x/week    OT Session Time  Therapeutic Activity: 23 minutes    SHOAIB Roca/L  [facility]  #[id number]

## 2024-03-15 NOTE — PLAN OF CARE
Patient discharging to [facility], Report given to eze. IV removed, Scripts signed and given to patient/EMS. [facility] to transport patient.         Problem: Patient Centered Care  Goal: Patient preferences are identified and integrated in the patient's plan of care  Description: Interventions:  - What would you like us to know as we care for you? From [facility]  - Provide timely, complete, and accurate information to patient/family  - Incorporate patient and family knowledge, values, beliefs, and cultural backgrounds into the planning and delivery of care  - Encourage patient/family to participate in care and decision-making at the level they choose  - Honor patient and family perspectives and choices  Outcome: Adequate for Discharge     Problem: Patient/Family Goals  Goal: Patient/Family Long Term Goal  Description: Patient's Long Term Goal: discharge    Interventions:  - Monitor vital signs, labs, test results  - Monitor blood glucose levels  - Oxygen and respiratory therapy as needed  - Pain management  - Follow MD orders  - Administer medications per MD order  - Diagnostics per order  - Update /  inform patient on plan of care  - Discharge planning  - See additional Care Plan goals for specific interventions  Outcome: Adequate for Discharge  Goal: Patient/Family Short Term Goal  Description: Patient's Short Term Goal: to breath better and resolve cough    Interventions:   - Monitor vital signs, labs, test results  - Oxygen and respiratory therapy as needed  - Follow MD orders  - Administer medications per MD order    - See additional Care Plan goals for specific interventions  Outcome: Adequate for Discharge     Problem: CARDIOVASCULAR - ADULT  Goal: Maintains optimal cardiac output and hemodynamic stability  Description: INTERVENTIONS:  - Monitor vital signs, rhythm, and trends  - Monitor for bleeding, hypotension and signs of decreased cardiac output  - Evaluate effectiveness of vasoactive  medications to optimize hemodynamic stability  - Monitor arterial and/or venous puncture sites for bleeding and/or hematoma  - Assess quality of pulses, skin color and temperature  - Assess for signs of decreased coronary artery perfusion - ex. Angina  - Evaluate fluid balance, assess for edema, trend weights  Outcome: Adequate for Discharge  Goal: Absence of cardiac arrhythmias or at baseline  Description: INTERVENTIONS:  - Continuous cardiac monitoring, monitor vital signs, obtain 12 lead EKG if indicated  - Evaluate effectiveness of antiarrhythmic and heart rate control medications as ordered  - Initiate emergency measures for life threatening arrhythmias  - Monitor electrolytes and administer replacement therapy as ordered  Outcome: Adequate for Discharge     Problem: RESPIRATORY - ADULT  Goal: Achieves optimal ventilation and oxygenation  Description: INTERVENTIONS:  - Assess for changes in respiratory status  - Assess for changes in mentation and behavior  - Position to facilitate oxygenation and minimize respiratory effort  - Oxygen supplementation based on oxygen saturation or ABGs  - Provide Smoking Cessation handout, if applicable  - Encourage broncho-pulmonary hygiene including cough, deep breathe, Incentive Spirometry  - Assess the need for suctioning and perform as needed  - Assess and instruct to report SOB or any respiratory difficulty  - Respiratory Therapy support as indicated  - Manage/alleviate anxiety  - Monitor for signs/symptoms of CO2 retention  Outcome: Adequate for Discharge     Problem: METABOLIC/FLUID AND ELECTROLYTES - ADULT  Goal: Glucose maintained within prescribed range  Description: INTERVENTIONS:  - Monitor Blood Glucose as ordered  - Assess for signs and symptoms of hyperglycemia and hypoglycemia  - Administer ordered medications to maintain glucose within target range  - Assess barriers to adequate nutritional intake and initiate nutrition consult as needed  - Instruct patient on  self management of diabetes  Outcome: Adequate for Discharge  Goal: Electrolytes maintained within normal limits  Description: INTERVENTIONS:  - Monitor labs and rhythm and assess patient for signs and symptoms of electrolyte imbalances  - Administer electrolyte replacement as ordered  - Monitor response to electrolyte replacements, including rhythm and repeat lab results as appropriate  - Fluid restriction as ordered  - Instruct patient on fluid and nutrition restrictions as appropriate  Outcome: Adequate for Discharge  Goal: Hemodynamic stability and optimal renal function maintained  Description: INTERVENTIONS:  - Monitor labs and assess for signs and symptoms of volume excess or deficit  - Monitor intake, output and patient weight  - Monitor urine specific gravity, serum osmolarity and serum sodium as indicated or ordered  - Monitor response to interventions for patient's volume status, including labs, urine output, blood pressure (other measures as available)  - Encourage oral intake as appropriate  - Instruct patient on fluid and nutrition restrictions as appropriate  Outcome: Adequate for Discharge     Problem: SKIN/TISSUE INTEGRITY - ADULT  Goal: Skin integrity remains intact  Description: INTERVENTIONS  - Assess and document risk factors for pressure ulcer development  - Assess and document skin integrity  - Monitor for areas of redness and/or skin breakdown  - Initiate interventions, skin care algorithm/standards of care as needed  Outcome: Adequate for Discharge     Problem: Impaired Functional Mobility  Goal: Achieve highest/safest level of mobility/gait  Description: Interventions:  - Assess patient's functional ability and stability  - Promote increasing activity/tolerance for mobility and gait  - Educate and engage patient/family in tolerated activity level and precautions    Outcome: Adequate for Discharge

## 2024-03-15 NOTE — PLAN OF CARE
Patient is AO x 4 on RA no complaints of pain, able to state needs. Call light in place       Problem: Patient Centered Care  Goal: Patient preferences are identified and integrated in the patient's plan of care  Description: Interventions:  - What would you like us to know as we care for you? From [facility]  - Provide timely, complete, and accurate information to patient/family  - Incorporate patient and family knowledge, values, beliefs, and cultural backgrounds into the planning and delivery of care  - Encourage patient/family to participate in care and decision-making at the level they choose  - Honor patient and family perspectives and choices  Outcome: Progressing     Problem: Patient/Family Goals  Goal: Patient/Family Long Term Goal  Description: Patient's Long Term Goal: discharge    Interventions:  - Monitor vital signs, labs, test results  - Monitor blood glucose levels  - Oxygen and respiratory therapy as needed  - Pain management  - Follow MD orders  - Administer medications per MD order  - Diagnostics per order  - Update /  inform patient on plan of care  - Discharge planning  - See additional Care Plan goals for specific interventions  Outcome: Progressing  Goal: Patient/Family Short Term Goal  Description: Patient's Short Term Goal: to breath better and resolve cough    Interventions:   - Monitor vital signs, labs, test results  - Oxygen and respiratory therapy as needed  - Follow MD orders  - Administer medications per MD order    - See additional Care Plan goals for specific interventions  Outcome: Progressing     Problem: CARDIOVASCULAR - ADULT  Goal: Maintains optimal cardiac output and hemodynamic stability  Description: INTERVENTIONS:  - Monitor vital signs, rhythm, and trends  - Monitor for bleeding, hypotension and signs of decreased cardiac output  - Evaluate effectiveness of vasoactive medications to optimize hemodynamic stability  - Monitor arterial and/or venous puncture  sites for bleeding and/or hematoma  - Assess quality of pulses, skin color and temperature  - Assess for signs of decreased coronary artery perfusion - ex. Angina  - Evaluate fluid balance, assess for edema, trend weights  Outcome: Progressing  Goal: Absence of cardiac arrhythmias or at baseline  Description: INTERVENTIONS:  - Continuous cardiac monitoring, monitor vital signs, obtain 12 lead EKG if indicated  - Evaluate effectiveness of antiarrhythmic and heart rate control medications as ordered  - Initiate emergency measures for life threatening arrhythmias  - Monitor electrolytes and administer replacement therapy as ordered  Outcome: Progressing     Problem: RESPIRATORY - ADULT  Goal: Achieves optimal ventilation and oxygenation  Description: INTERVENTIONS:  - Assess for changes in respiratory status  - Assess for changes in mentation and behavior  - Position to facilitate oxygenation and minimize respiratory effort  - Oxygen supplementation based on oxygen saturation or ABGs  - Provide Smoking Cessation handout, if applicable  - Encourage broncho-pulmonary hygiene including cough, deep breathe, Incentive Spirometry  - Assess the need for suctioning and perform as needed  - Assess and instruct to report SOB or any respiratory difficulty  - Respiratory Therapy support as indicated  - Manage/alleviate anxiety  - Monitor for signs/symptoms of CO2 retention  Outcome: Progressing     Problem: METABOLIC/FLUID AND ELECTROLYTES - ADULT  Goal: Glucose maintained within prescribed range  Description: INTERVENTIONS:  - Monitor Blood Glucose as ordered  - Assess for signs and symptoms of hyperglycemia and hypoglycemia  - Administer ordered medications to maintain glucose within target range  - Assess barriers to adequate nutritional intake and initiate nutrition consult as needed  - Instruct patient on self management of diabetes  Outcome: Progressing  Goal: Electrolytes maintained within normal limits  Description:  INTERVENTIONS:  - Monitor labs and rhythm and assess patient for signs and symptoms of electrolyte imbalances  - Administer electrolyte replacement as ordered  - Monitor response to electrolyte replacements, including rhythm and repeat lab results as appropriate  - Fluid restriction as ordered  - Instruct patient on fluid and nutrition restrictions as appropriate  Outcome: Progressing  Goal: Hemodynamic stability and optimal renal function maintained  Description: INTERVENTIONS:  - Monitor labs and assess for signs and symptoms of volume excess or deficit  - Monitor intake, output and patient weight  - Monitor urine specific gravity, serum osmolarity and serum sodium as indicated or ordered  - Monitor response to interventions for patient's volume status, including labs, urine output, blood pressure (other measures as available)  - Encourage oral intake as appropriate  - Instruct patient on fluid and nutrition restrictions as appropriate  Outcome: Progressing     Problem: SKIN/TISSUE INTEGRITY - ADULT  Goal: Skin integrity remains intact  Description: INTERVENTIONS  - Assess and document risk factors for pressure ulcer development  - Assess and document skin integrity  - Monitor for areas of redness and/or skin breakdown  - Initiate interventions, skin care algorithm/standards of care as needed  Outcome: Progressing     Problem: Impaired Functional Mobility  Goal: Achieve highest/safest level of mobility/gait  Description: Interventions:  - Assess patient's functional ability and stability  - Promote increasing activity/tolerance for mobility and gait  - Educate and engage patient/family in tolerated activity level and precautions  Outcome: Progressing

## 2024-03-15 NOTE — CM/SW NOTE
03/15/24 1210   Discharge disposition   Expected discharge disposition subacute   Post Acute Care Provider [facility] in   Discharge transportation [facility]     Pt discussed during nursing rounds. Pt is stable for dc today. MD dc order entered. Pt now agreeable to alternative [facility] [place] as reiterated [facility] is [facility] [place]. Pt agreeable to [facility] for [facility] [place], liaison Yuliana confirmed pt has been accepted and bed is available today. [facility] scheduled for 3:30pm .    Number for nurse report is [phone].    Plan: [facility] for DOU[facility] today.    / to remain available for support and/or discharge planning.     JESUS ALBERTO DrakeN    [phone]

## 2024-03-18 NOTE — PAYOR COMM NOTE
--------------  DISCHARGE REVIEW    Payor: [employer] P[employer]  Subscriber #:  [id number]  Authorization Number: [id number]    Admit date: 3/11/24  Admit time:  10:36 AM  Discharge Date: 3/15/2024  4:11 PM     Admitting Physician: Ariadna Coburn MD  Attending Physician:  No att. providers found  Primary Care Physician: Jenna Gutierrez MD          Discharge Summary Notes        Discharge Summary signed by Yoselyn Mota MD at 3/15/2024 12:54 PM       Author: Yoselyn Mota MD Specialty: HOSPITALIST Author Type: Physician    Filed: 3/15/2024 12:54 PM Date of Service: 3/15/2024 11:21 AM Status: Signed    : Yoselyn Mota MD (Physician)    Related Notes: Original Note by Susu Cameron NP (Nurse Practitioner) filed at 3/15/2024 11:27 AM           [facility]   Patient ID:  Deneen Chapa  [id number]  59 year old  2/28/1965    Admit date: 3/11/2024  Discharge date: 3/1/2024    Primary Care Physician: Jenna Gutierrez MD   Attending Physician: Yoselyn Mota MD   Consults:     Hospital Discharge Diagnoses:   ----See D/C Summary for further Dx    Risk of Readmission Lace+ Score: 73  59-90 High Risk  29-58 Medium Risk  0-28   Low Risk.    TCM Follow-Up Recommendation:  LACE > 58: High Risk of readmission after discharge from the hospital.    Please note that only [facility] and EMG patients enrolled in the Medicare ACO, BCBS ACO and BCBS HMOs will be handled by the [facility] Care Management team.  For all other patients, please follow usual protocol for discharge care transition.    Reason for admission  Deneen Chapa is a 59  year old male with PMH significant for CHF-diastolic, insomnia, migraines, afib, secondary hypercoagulable state, insomnia, HTN, HL, DM- diet controlled, AYANNA, morbid obesity with recent admission to [facility] with  a mechanical fall with B/L Knee Pain  found to have a right tibial plateau fx and possible Left MCL strain     History obtained from  patient in the emergency room.  He states that he has been progressing in therapy at [facility] from a mobility perspective and has been there about 3 weeks.  About 4 days ago he noted nonproductive cough and fever of ~99.  He states he is having difficulty breathing.  He does have a history of sleep apnea and does use CPAP.  He also has a history of congestive heart failure related to atrial fibrillation and states he has refused a few doses of his water pills however the dosages are higher than he normally takes.  Upon seeing patient in the emergency room he he has issues with coughing.  He is on room air.  He denied any sore throat sinus pain or ear pain.  He states that he has not been around anybody that he knows that is actively sick other than the staff at the rehab center. CXR suggestive of PNA.  He has noted to be in A-fib with heart rate in the low 100s after getting 15 mg of IV push Cardizem.  He does have he states he continues to have pain in his knees and that the pain medication is not helping however they will not increase the dosages.    Hospital Course:     Deneen Chapa is a 59 year old male with PMH significant for CHF-diastolic, insomnia, migraines, afib, secondary hypercoagulable state, insomnia, HTN, HL, DM- diet controlled, AYANNA, morbid obesity with recent admission to [facility] with  a mechanical fall with B/L Knee Pain  found to have a right tibial plateau fx and possible Left MCL strain who presents from [facility] with cough, sob and fevers found to have metapneumovirus and possible pna. Hospital course complicated by fibrillation with rapid ventricular response, HR improved with adjustment of cardiac meds. Metformin added for DM with HgbA1c of 7.5. pt needs wt loss endeavors. Pt has follow up with ortho on 3/18 as previously scheduled. Pt discharged to [facility], see below for details      Fever/Cough/SOB 2/2 Metapneumovirus possible PNA   -fevers resolved. WBC normal. PCT normal   -CXR preliminary  with consolidation Left lung   -negative for influenza, covid and RSV  -blood cx NGTD  -RVP + for metapneumovirus   -MRSA screen negative   -prn anti tussives  -abx- completed zithromax, ceftin to complete 5 day course-EOT 3/16   -on RA     Acute on Chronic CHF, diastolic   Persistent Atrial fibrillation with RVR  Secondary Hypercoagulable State  HTN  HL-not on meds  -2023 echo with EF 45-50%  -CXR not suggestive of CHF but noted LE edema. BNP 17  -continue home meds-asa,lasix, Toprol and xarelto   -Cardizem CD changed to cardizem  mg every 6 hours add digoxin 0.125 mg daily  -holding home lisinopril with lower blood pressures.     Mechanical fall w/ shannon knee pain  Right Tibial Fx  Possible Left MCL Sprain   -work up at [facility] 1)CT of right knee-mildly comminuted and minimally displaced intra-articular fracture along the lateral tibial plateau 2) unable to get MRI's of knees do to wt 3) r/o for vascular disease with bernice  -pain meds-pt currently using tramadol and tylenol   -per [facility] notes- NWB RLE (can do active and passive ROM in NWB status) and WBAT to LLE, needs to wear immobilizers on right at all times, may remove mobilizers on left leg when in bed  -follow up with Dr Cook,appt 3/18 scheduled     DM Type II  -HgbA1C  7.6  -metformin 500 mg daily added with SSI prn  -accuchecks  -ADA diet      AYANNA  -cpap     Insomnia   -continue home meds-currently only taking trazodone     Migraines  Dizziness   -per med rec no longer taking topiramate, qulipta or eletriptan  -follows with Dr Acevedo-neurology      Morbid Obesity  -PER [facility] notes--he briefly tried metformin, has not tried any new meds or d/w PCP. Encourage a strict change in diet and if possible/qualifies/affords new injectable wt loss meds such as ozempic/wegovy/mounjaro  -recommend follow up with pcp      [facility]  -full code  -POA father      MA/PARISA Reach  -Re- Entry: 2/16 discharge from [facility] to [facility]  -Consults: none  -[facility]  -Appointments: follow up Dr Jenna Gutierrez MD within 1 week of discharge from rehab        EXAM:   GENERAL: no apparent distress  NEURO: A/A Ox3  RESP: non labored, CTA  CARDIO: Regular, no murmur  ABD: soft, NT, ND, BS+  EXTREMITIES: brace to leg, trace le edema     Discharge Instructions     Medication List        START taking these medications      benzonatate 200 MG Caps  Commonly known as: Tessalon     cefuroxime 500 MG Tabs  Commonly known as: Ceftin  Take 1 tablet (500 mg total) by mouth every 12 (twelve) hours for 3 doses.     digoxin 0.125 MG Tabs  Commonly known as: Lanoxin     dilTIAZem HCl 120 MG Tabs  Commonly known as: CARDIZEM     insulin aspart 100 Units/mL Sopn  Commonly known as: NovoLOG     metFORMIN 500 MG Tabs  Commonly known as: Glucophage            CHANGE how you take these medications      acetaminophen 325 MG Tabs  Commonly known as: Tylenol  What changed:   medication strength  how much to take  when to take this  reasons to take this            CONTINUE taking these medications      aspirin 81 MG Tbec     furosemide 20 MG Tabs  Commonly known as: Lasix     guaiFENesin 100 MG/5 ML  Commonly known as: Robitussin     metoprolol succinate  MG Tb24  Commonly known as: Toprol XL     Polyethylene Glycol 3350 17 g Pack  Commonly known as: MIRALAX     rivaroxaban 20 MG Tabs  Commonly known as: Xarelto     Sennosides 17.2 MG Tabs     traMADol 50 MG Tabs  Commonly known as: Ultram  Take 2 tablets (100 mg total) by mouth every 8 (eight) hours as needed for Pain.     traZODone 100 MG Tabs  Commonly known as: Desyrel            STOP taking these medications      cyclobenzaprine 10 MG Tabs  Commonly known as: Flexeril     dilTIAZem HCl ER Coated Beads 360 MG Cp24  Commonly known as: CARDIZEM CD     lisinopril 5 MG Tabs  Commonly known as: Prinivil; Zestril     Magnesium 100 MG Caps     Naloxone HCl 4 MG/0.1ML Liqd     oxyCODONE-acetaminophen  MG Tabs  Commonly known as: Percocet                Where to Get Your Medications        You can get these medications from any pharmacy    Bring a paper prescription for each of these medications  cefuroxime 500 MG Tabs  traMADol 50 MG Tabs       Code Status: Full Code    Important follow up:   Follow-up Information       Jenna Gutierrez MD Follow up.    Specialty: Internal Medicine  Why: within 1 week of discharge from rehab  Contact information:  [address]  [address]  [facility] [address]  [phone]               Sumeet Cao MD Follow up on 3/18/2024.    Specialty: SURGERY, ORTHOPEDIC  Why: 3/18 9:45am  Contact information:  [address]  [address]  [facility] [address]  [phone]                           Disposition: SNF  Discharged Condition: stable      Additional patient instructions:  Complete course of oral antibiotics for pneumonia    Pt has diabetes with HgbA1C of 7.6,. recommend wt loss and ada diet. Metformin 500 mg daily added plus ssi prn, adjust as needed    Pt has follow up ortho appt on 3/18 in [place] as previously scheduled   =========================================================================================================================    I Reconciled current and discharge medications on day of discharge  Patient had opportunity to ask questions and state understand and agree with therapeutic plan as outlined    Total Time Coordinating Care: greater than 30 minutes  Is this a shared or split note between Advanced Practice Provider and Physician? Yes    Note: This chart was prepared using voice recognition software and may contain unintended word substitution errors.     Susu Cameron RN, NP   [facility] Hospitalist Team   3/15/2024      SEE ATTENDING NOTE BELOW      Patient examined and assessed independently. Agree with above APN assessment.     Hospital course: Deneen Chapa is a 59 year old male with PMH significant for CHF-diastolic, insomnia, migraines, afib, secondary hypercoagulable state,  insomnia, HTN, HL, DM- diet controlled, AYANNA, morbid obesity with recent admission to [facility] with  a mechanical fall with B/L Knee Pain  found to have a right tibial plateau fx and possible Left MCL strain who presents from [facility] with cough, sob and fevers found to have metapneumovirus and possible pna. Hospital course complicated by fibrillation with rapid ventricular response, HR improved with adjustment of cardiac meds. Metformin added for DM with HgbA1c of 7.5. pt needs wt loss endeavors. Pt has follow up with ortho on 3/18 as previously scheduled. Pt discharged to [facility], see below for details     Objective  /77 (BP Location: Right arm)   Pulse 81   Temp 98.1 °F (36.7 °C) (Oral)   Resp 20   Ht 5' 10\" (1.778 m)   Wt (!) 472 lb 8 oz (214.3 kg)   SpO2 90%   BMI 67.80 kg/m²     Exam:  GEN: morbidly obese male in NAD  HEENT: EOMI  Pulm: CTAB, no crackles or wheezes  CV: RRR, no murmurs  ABD: Soft, non-tender, non-distended, +BS  SKIN: warm, dry  EXT: + edema      Assessment/Plan     Deneen Chapa is a 59 year old male with PMH significant for CHF-diastolic, insomnia, migraines, afib, secondary hypercoagulable state, insomnia, HTN, HL, DM- diet controlled, AYANNA, morbid obesity with recent admission to [facility] with  a mechanical fall with B/L Knee Pain  found to have a right tibial plateau fx and possible Left MCL strain who presents from [facility] with cough, sob and fevers found to have metapneumovirus and possible pna. Hospital course complicated by fibrillation with rapid ventricular response, HR improved with adjustment of cardiac meds. Metformin added for DM with HgbA1c of 7.5. pt needs wt loss endeavors. Pt has follow up with ortho on 3/18 as previously scheduled. Plans for return to [facility] for [facility] oce insurance auth obtained, see below for details      Fever/Cough/SOB 2/2 Metapneumovirus possible PNA   Acute on Chronic CHF, diastolic   Persistent Atrial fibrillation with RVR  Secondary  Hypercoagulable State  HTN  HL-not on meds  Mechanical fall w/ shannon knee pain  Right Tibial Fx  Possible Left MCL Sprain   DM Type II  AYANNA  Insomnia   Migraines  Dizziness   Morbid Obesity  GOC        Plan  - stable for discharge to SNF today     Rest as above.     Yoselyn Mota MD  [facility] hospitalist        Electronically signed by Yoselyn Mota MD on 3/15/2024 12:54 PM         REVIEWER COMMENTS

## 2024-05-23 ENCOUNTER — TELEPHONE (OUTPATIENT)
Age: 59
End: 2024-05-23

## 2024-05-23 ENCOUNTER — E-ADVICE (OUTPATIENT)
Dept: CARDIOLOGY | Age: 59
End: 2024-05-23

## 2024-07-03 ENCOUNTER — TELEPHONE (OUTPATIENT)
Dept: CARDIOLOGY | Age: 59
End: 2024-07-03

## 2024-07-03 ENCOUNTER — E-ADVICE (OUTPATIENT)
Dept: CARDIOLOGY | Age: 59
End: 2024-07-03

## 2024-07-03 RX ORDER — DIGOXIN 125 MCG
125 TABLET ORAL DAILY
COMMUNITY
End: 2024-07-03 | Stop reason: SDUPTHER

## 2024-07-03 RX ORDER — DIGOXIN 125 MCG
125 TABLET ORAL DAILY
Qty: 30 TABLET | Refills: 0 | Status: SHIPPED | OUTPATIENT
Start: 2024-07-03

## 2024-07-25 ENCOUNTER — TELEPHONE (OUTPATIENT)
Dept: PREADMISSION TESTING | Age: 59
End: 2024-07-25

## 2024-08-02 ENCOUNTER — E-ADVICE (OUTPATIENT)
Dept: CARDIOLOGY | Age: 59
End: 2024-08-02

## 2024-08-20 ENCOUNTER — TELEPHONE (OUTPATIENT)
Dept: CARDIOLOGY | Age: 59
End: 2024-08-20

## 2024-08-21 ENCOUNTER — APPOINTMENT (OUTPATIENT)
Dept: CARDIOLOGY | Age: 59
End: 2024-08-21

## 2024-11-22 ENCOUNTER — APPOINTMENT (OUTPATIENT)
Dept: CARDIOLOGY | Age: 59
End: 2024-11-22

## 2025-01-24 ENCOUNTER — APPOINTMENT (OUTPATIENT)
Dept: CARDIOLOGY | Age: 60
End: 2025-01-24

## 2025-01-31 ENCOUNTER — APPOINTMENT (OUTPATIENT)
Dept: CARDIOLOGY | Age: 60
End: 2025-01-31

## 2025-02-17 ENCOUNTER — HOSPITAL ENCOUNTER (EMERGENCY)
Facility: HOSPITAL | Age: 60
Discharge: HOME | End: 2025-02-17
Attending: EMERGENCY MEDICINE
Payer: MEDICARE

## 2025-02-17 ENCOUNTER — APPOINTMENT (OUTPATIENT)
Dept: CT IMAGING | Facility: HOSPITAL | Age: 60
End: 2025-02-17
Attending: EMERGENCY MEDICINE
Payer: MEDICARE

## 2025-02-17 VITALS
SYSTOLIC BLOOD PRESSURE: 109 MMHG | WEIGHT: 315 LBS | OXYGEN SATURATION: 100 % | HEART RATE: 69 BPM | BODY MASS INDEX: 46.65 KG/M2 | RESPIRATION RATE: 16 BRPM | HEIGHT: 69 IN | DIASTOLIC BLOOD PRESSURE: 61 MMHG | TEMPERATURE: 98 F

## 2025-02-17 DIAGNOSIS — M54.2 NECK PAIN: ICD-10-CM

## 2025-02-17 DIAGNOSIS — R21 RASH: Primary | ICD-10-CM

## 2025-02-17 DIAGNOSIS — R51.9 NONINTRACTABLE HEADACHE, UNSPECIFIED CHRONICITY PATTERN, UNSPECIFIED HEADACHE TYPE: ICD-10-CM

## 2025-02-17 LAB
ANION GAP SERPL CALC-SCNC: 9 MMOL/L (ref 0–18)
BASOPHILS # BLD AUTO: 0.06 X10(3) UL (ref 0–0.2)
BASOPHILS NFR BLD AUTO: 0.7 %
BUN BLD-MCNC: 15 MG/DL (ref 9–23)
CALCIUM BLD-MCNC: 9.9 MG/DL (ref 8.7–10.6)
CHLORIDE SERPL-SCNC: 105 MMOL/L (ref 98–112)
CO2 SERPL-SCNC: 27 MMOL/L (ref 21–32)
CREAT BLD-MCNC: 0.87 MG/DL
EGFRCR SERPLBLD CKD-EPI 2021: 99 ML/MIN/1.73M2 (ref 60–?)
EOSINOPHIL # BLD AUTO: 0.2 X10(3) UL (ref 0–0.7)
EOSINOPHIL NFR BLD AUTO: 2.2 %
ERYTHROCYTE [DISTWIDTH] IN BLOOD BY AUTOMATED COUNT: 14 %
GLUCOSE BLD-MCNC: 112 MG/DL (ref 70–99)
HCT VFR BLD AUTO: 47.9 %
HGB BLD-MCNC: 16.3 G/DL
IMM GRANULOCYTES # BLD AUTO: 0.03 X10(3) UL (ref 0–1)
IMM GRANULOCYTES NFR BLD: 0.3 %
LYMPHOCYTES # BLD AUTO: 2.26 X10(3) UL (ref 1–4)
LYMPHOCYTES NFR BLD AUTO: 24.9 %
MCH RBC QN AUTO: 30.6 PG (ref 26–34)
MCHC RBC AUTO-ENTMCNC: 34 G/DL (ref 31–37)
MCV RBC AUTO: 89.9 FL
MONOCYTES # BLD AUTO: 1 X10(3) UL (ref 0.1–1)
MONOCYTES NFR BLD AUTO: 11 %
NEUTROPHILS # BLD AUTO: 5.54 X10 (3) UL (ref 1.5–7.7)
NEUTROPHILS # BLD AUTO: 5.54 X10(3) UL (ref 1.5–7.7)
NEUTROPHILS NFR BLD AUTO: 60.9 %
OSMOLALITY SERPL CALC.SUM OF ELEC: 294 MOSM/KG (ref 275–295)
PLATELET # BLD AUTO: 227 10(3)UL (ref 150–450)
POTASSIUM SERPL-SCNC: 4.4 MMOL/L (ref 3.5–5.1)
RBC # BLD AUTO: 5.33 X10(6)UL
SODIUM SERPL-SCNC: 141 MMOL/L (ref 136–145)
WBC # BLD AUTO: 9.1 X10(3) UL (ref 4–11)

## 2025-02-17 PROCEDURE — 80048 BASIC METABOLIC PNL TOTAL CA: CPT | Performed by: EMERGENCY MEDICINE

## 2025-02-17 PROCEDURE — 70496 CT ANGIOGRAPHY HEAD: CPT | Performed by: EMERGENCY MEDICINE

## 2025-02-17 PROCEDURE — 70498 CT ANGIOGRAPHY NECK: CPT | Performed by: EMERGENCY MEDICINE

## 2025-02-17 PROCEDURE — 96374 THER/PROPH/DIAG INJ IV PUSH: CPT

## 2025-02-17 PROCEDURE — 96361 HYDRATE IV INFUSION ADD-ON: CPT

## 2025-02-17 PROCEDURE — 99284 EMERGENCY DEPT VISIT MOD MDM: CPT

## 2025-02-17 PROCEDURE — 85025 COMPLETE CBC W/AUTO DIFF WBC: CPT | Performed by: EMERGENCY MEDICINE

## 2025-02-17 RX ORDER — ACETAMINOPHEN 325 MG/1
650 TABLET ORAL ONCE
Status: COMPLETED | OUTPATIENT
Start: 2025-02-17 | End: 2025-02-17

## 2025-02-17 RX ORDER — ONDANSETRON 2 MG/ML
4 INJECTION INTRAMUSCULAR; INTRAVENOUS ONCE
Status: COMPLETED | OUTPATIENT
Start: 2025-02-17 | End: 2025-02-17

## 2025-02-17 NOTE — DISCHARGE INSTRUCTIONS
Please take Tylenol or Motrin as needed for pain control.  Please follow-up with neurology as needed for your headache.  Drink lots of fluids plenty of rest follow-up with dermatology for your scalp rash.

## 2025-02-17 NOTE — ED INITIAL ASSESSMENT (HPI)
Pt to ER via EMS with c/o headache for the past 3-4 days. Headache to right side of head that goes down to neck.  Pt also with c/o blurry vision for \"a while but getting worse.\"  Pt ambulatory from EMS stretcher to room with steady gait.   Pt AO x 4 during triage, no slurred speech, no facial droop noted.

## 2025-02-17 NOTE — ED PROVIDER NOTES
Patient Seen in: [facility] Emergency Department      History     Chief Complaint   Patient presents with    Headache     Stated Complaint: Headache for last 3 days    Subjective:   HPI      59-year-old male presents to the ED with complaints of headache for the past 3 days.  Reports pain starts in the right lateral aspect of his neck goes up to his head sharp stabbing pain intermittent no inciting or relieving source no associated visual changes denies any double vision or tunnel vision no paresthesias no focal weakness no trauma to his head.     Objective:     Past Medical History:    Alcohol abuse    last drink Jan 2020    Anxiety    [facility]    Arrhythmia    Atrial fibrillation (HCC)    EP cards: Dr. Sumeet Colon, PAroxysmal AFIB    Calculus of kidney    Congestive heart failure (CHF) (HCC)    Depression    [facility] psych: Dr. Denny--currently on wellbutrin as of 3/28/22, tried zoloft and didn't help    High blood pressure    Hyperlipidemia    Morbid obesity (HCC)    Obstructive sleep apnea    Prediabetes    Sleep apnea    Vertigo    Dr. Kallie Acevedo----Mirtazepine              Past Surgical History:   Procedure Laterality Date    Appendectomy      Colonoscopy      Jaw surgery      Other surgical history  02/2018, 2020    cardioversion, Dr. Sumeet Colon---2020    Other surgical history  05/07/2019    Cysto-Dr. Timmons                Social History     Socioeconomic History    Marital status: Single   Tobacco Use    Smoking status: Never    Smokeless tobacco: Never   Vaping Use    Vaping status: Never Used   Substance and Sexual Activity    Alcohol use: No     Comment: stopped drinking 2/2018    Drug use: No   Social History Narrative    -disability--for AFIB    -single, no children    -lives alone    -no tobacco, no ETOH, no cannabis, no illicits    -no ETOh since Jan 2020     Social Drivers of Health     Food Insecurity: No Food Insecurity (3/11/2024)    Food Insecurity     Food Insecurity: Never true    Transportation Needs: No Transportation Needs (3/11/2024)    Transportation Needs     Lack of Transportation: No   Housing Stability: Low Risk  (3/11/2024)    Housing Stability     Housing Instability: No                  Physical Exam     ED Triage Vitals   BP 02/17/25 0402 113/87   Pulse 02/17/25 0402 79   Resp 02/17/25 0402 20   Temp 02/17/25 0410 98.1 °F (36.7 °C)   Temp src 02/17/25 0410 Temporal   SpO2 02/17/25 0402 100 %   O2 Device 02/17/25 0402 None (Room air)       Current Vitals:   Vital Signs  BP: 112/76  Pulse: 78  Resp: 20  Temp: 98.1 °F (36.7 °C)  Temp src: Temporal  MAP (mmHg): 86    Oxygen Therapy  SpO2: 100 %  O2 Device: None (Room air)        Physical Exam  Vitals and nursing note reviewed.   Constitutional:       Appearance: He is well-developed.   HENT:      Head: Normocephalic and atraumatic.   Eyes:      Pupils: Pupils are equal, round, and reactive to light.   Cardiovascular:      Rate and Rhythm: Normal rate and regular rhythm.   Pulmonary:      Effort: Pulmonary effort is normal.      Breath sounds: Normal breath sounds.   Abdominal:      General: Bowel sounds are normal.      Palpations: Abdomen is soft.   Musculoskeletal:         General: No deformity.      Cervical back: Normal range of motion and neck supple.      Comments: No midline CTL spine tenderness with patient step-offs deformities   Skin:     General: Skin is warm and dry.      Capillary Refill: Capillary refill takes less than 2 seconds.   Neurological:      Mental Status: He is alert and oriented to person, place, and time.      Comments: Cranial nerves II through XII grossly intact, PERRLA EOMI, visual fields intact, no facial droop, no slurred speech, strength and sensation grossly intact, +2 patellar and bicep reflexes bilaterally, no ataxic movements of upper or lower extremities bilaterally, normal gait             ED Course     Labs Reviewed   BASIC METABOLIC PANEL (8) - Abnormal; Notable for the following components:        Result Value    Glucose 112 (*)     All other components within normal limits   CBC WITH DIFFERENTIAL WITH PLATELET   RAINBOW DRAW LAVENDER   RAINBOW DRAW LIGHT GREEN   RAINBOW DRAW BLUE   RAINBOW DRAW GOLD            CT HEAD WITHOUT CONTRAST  CTA COW WITH CONTRAST  CTA NECK WITH CONTRAST    COMPARISON: None    IMPRESSION:    CT HEAD:  No acute intracranial hemorrhage, mass effect, midline shift, or hydrocephalus.   No loss of gray-white matter differentiation.  No acute calvarial fracture.      CTA COW:  No significant stenosis or occlusion of the major proximal intracranial arteries.    CTA NECK:  Photon starvation artifact markedly limits assessment from the level of the thoracic inlet and down.  Essentially nondiagnostic evaluation of the bilateral vertebral arteries up to the C3-C4 level.    Within the visualized segments, no significant stenosis or occlusion of the major cervical arteries.                 MDM      This is a 59-year-old male who presents He with complaints of headache and neck pain.  Vital signs stable arrival patient peers nontoxic examination no signs of meningismus on examination.  Her examination benign no focal neurodeficits seen on examination nonspecific rash overlying scalp however is not tender to palpation no vesicular lesions.  Will obtain CT a head and neck rule out aneurysm versus acute bleed versus intracranial lesion.  CT scan negative for any acute pathology.  Patient denies any symptoms at this time.  Of note he has macular papular rash overlying his scalp and no particular pattern is not tender to palpation or pruritic or painful.  Discussed follow-up with dermatology.  He does not have any pain at this time I did discuss close follow-up with neurology for further evaluation and possibly cervical radiculopathy.  Patient shows understanding plan and is in agreement plan discharged home stable condition.        Medical Decision Making      Disposition and Plan     Clinical  Impression:  1. Rash    2. Nonintractable headache, unspecified chronicity pattern, unspecified headache type    3. Neck pain         Disposition:  Discharge  2/17/2025  6:05 am    Follow-up:  [address]  [address]  [facility] [address] [phone]  Call in 1 day(s)      Maral Valero  [address] [place] [address]  [facility] [address] [phone]    Call in 1 day(s)            Medications Prescribed:  Current Discharge Medication List              Supplementary Documentation:

## 2025-03-14 ENCOUNTER — APPOINTMENT (OUTPATIENT)
Dept: CARDIOLOGY | Age: 60
End: 2025-03-14

## 2025-03-15 ENCOUNTER — TELEPHONE (OUTPATIENT)
Dept: CARDIOLOGY | Age: 60
End: 2025-03-15

## 2025-06-06 ENCOUNTER — APPOINTMENT (OUTPATIENT)
Dept: CARDIOLOGY | Age: 60
End: 2025-06-06

## 2025-07-24 ENCOUNTER — APPOINTMENT (OUTPATIENT)
Dept: CARDIOLOGY | Age: 60
End: 2025-07-24

## 2025-08-18 ENCOUNTER — TELEPHONE (OUTPATIENT)
Dept: CARDIOLOGY | Age: 60
End: 2025-08-18

## 2025-08-19 ENCOUNTER — APPOINTMENT (OUTPATIENT)
Dept: CARDIOLOGY | Age: 60
End: 2025-08-19

## 2025-10-08 ENCOUNTER — APPOINTMENT (OUTPATIENT)
Dept: CARDIOLOGY | Age: 60
End: 2025-10-08

## (undated) NOTE — LETTER
September 17, 2017    Patient: Harper Garcia   Date of Visit: 9/17/2017       To Whom It May Concern:    Harper Garcia was seen and treated in our emergency department on 9/17/2017.  He may return to work on 9/19/17    If you have any questions or

## (undated) NOTE — ED AVS SNAPSHOT
Jeni Sandhoff   MRN: [id number]    Department:  [facility] Emergency Department   Date of Visit:  11/26/2018           Disclosure     Insurance plans vary and the physician(s) referred by the ER may not be covered by your plan.  Please contac CARE PHYSICIAN AT ONCE OR RETURN IMMEDIATELY TO THE EMERGENCY DEPARTMENT. If you have been prescribed any medication(s), please fill your prescription right away and begin taking the medication(s) as directed.   If you believe that any of the medications

## (undated) NOTE — IP AVS SNAPSHOT
[address]  [address] [facility] [phone]                Discharge Summary   2/28/2017    Harper Garcia           Admission Information        Provider Department    2/28/2017 Al Chavarria MD [facility] What changed:  medication strength   Next dose due:  3/2 in the morning        Take 1 tablet (100 mg total) by mouth daily.     Midge Climes taking these medications        Instructions Authorizing Provider    Rubi 0.1 (03/01/17)  0.0    (02/28/17)  68 (02/28/17)  20 (02/28/17)  9 (02/28/17)  1 (02/28/17)  1  (02/28/17)  5.3 (02/28/17)  1.6 (02/28/17)  0.7 (02/28/17)  0.1 (02/28/17)  0.1      Metabolic Lab Results  (Last result in the past 90 days)    HgbA1C Glucose information, go to [email or web address]. org and click on the Sign Up Now link in the [employer] box. Enter your [facility] Activation Code exactly as it appears below along with your Zip Code and Date of Birth to complete the sign-up process.  If you do Enlarged Prostate Medications     Prostatic Hypertrophy Agents    ALFUZOSIN HCL ER OR       Use: Improve urine flow that has become difficult because of an enlarged prostate   Most common side effects: Impotence, decreased libido, low blood pressure, gynec

## (undated) NOTE — LETTER
Date & Time: 11/26/2018, 9:25 PM  Patient: Bessie Salazar  Encounter Provider(s):    Qiana Montoya MD       To Whom It May Concern:    Bessie Salazar was seen and treated in our department on 11/26/2018. He may return to work 11/29/18.     If you ha

## (undated) NOTE — ED AVS SNAPSHOT
Maral Alvarez   MRN: [id number]    Department:  [facility] Emergency Department   Date of Visit:  9/17/2017           Disclosure     Insurance plans vary and the physician(s) referred by the ER may not be covered by your plan.  Please contact CARE PHYSICIAN AT ONCE OR RETURN IMMEDIATELY TO THE EMERGENCY DEPARTMENT. If you have been prescribed any medication(s), please fill your prescription right away and begin taking the medication(s) as directed.   If you believe that any of the medications

## (undated) NOTE — IP AVS SNAPSHOT
Patient Demographics     Address  [address]   [facility] [address] Phone  [phone] (Home)  [phone] (Mobile) *Preferred* E-mail Address  [email or web address]      Patient Contacts     Name Relation Home Work Mobile    Sandra Powers Friend [phone]    Deneen Chapa Father   [phone]      Allergies as of 2/16/2024  Review status set to In Progress on 2/10/2024   No Known Allergies      Noted Reaction Type Reactions    DELETED: Amlodipine 07/18/2017    MYALGIA    DELETED: Hydrocodone 05/26/2016    UNKNOWN    Extreme nausea    DELETED: Losartan 07/18/2017    PAIN    DELETED: Metoprolol 07/18/2017    PAIN      Code Status Information     Code Status    Full Code        Patient Instructions       Knee immobilizers on indefinitely.  Non weight bearing to right lower extremity.  Weightbearing as tolerated to left lower extremity with knee immobilizer on per Dr. Cao     Cleared to continue ankle pumps as well as work on active and passive knee range of motion as he is able though would do so in a nonweightbearing manner     Recommend bilateral knee MRI to better evaluate for ligamentous injury/injury burden     Follow up with orthopedic surgeon Dr. Cao as needed      Follow-up Information     Jenna Gutierrez MD. Schedule an appointment as soon as possible for a visit in 3 day(s).    Specialty: Internal Medicine  Contact information:  [address]  [facility] [address] [phone]             Sumeet Cao MD Follow up in 2 week(s).    Specialty: SURGERY, ORTHOPEDIC  Contact information:  [address]  [address]  [facility] [address] [phone]                        Your Home Meds List      TAKE these medications       Instructions Authorizing Provider Morning Afternoon Evening As Needed   cyclobenzaprine 10 MG Tabs  Commonly known as: Flexeril      Take 1 tablet (10 mg total) by mouth 3 (three) times daily as needed for Muscle spasms.   Marquis  Alverto         dilTIAZem HCl ER Coated Beads 360 MG Cp24  Commonly known as: CARDIZEM CD      Take 1 capsule (360 mg total) by mouth daily.          furosemide 20 MG Tabs  Commonly known as: Lasix      Take 1 tablet (20 mg total) by mouth daily.   Shylilly Lexi         lisinopril 5 MG Tabs  Commonly known as: Prinivil; Zestril      Take 1 tablet (5 mg total) by mouth daily.   Jaye Lexi         Magnesium 100 MG Caps      Take 1 tablet by mouth 2 (two) times a day.          metoprolol succinate  MG Tb24  Commonly known as: Toprol XL      Take 1 tablet (100 mg total) by mouth 2 (two) times a day.   Jenna K Matt         Naloxone HCl 4 MG/0.1ML Liqd      4 mg by Nasal route as needed. If patient remains unresponsive, repeat dose in other nostril 2-5 minutes after first dose.   Marquis Bradleyicezard         oxyCODONE 10 MG Tabs      Take 1 tablet (10 mg total) by mouth every 4 (four) hours as needed.   Marquis Saicezard         Polyethylene Glycol 3350 17 g Pack  Commonly known as: MIRALAX      Take 17 g by mouth daily as needed (If no bowel movement in last 24 hours).   Marquis Saiyad         rivaroxaban 20 MG Tabs  Commonly known as: Xarelto      Take 1 tablet (20 mg total) by mouth daily with food.          Sennosides 17.2 MG Tabs      Take 1 tablet (17.2 mg total) by mouth nightly as needed (constipation, as needed if no bowel movement that day).   Marquis Saiyad         traMADol 50 MG Tabs  Commonly known as: Ultram      Take 2 tablets (100 mg total) by mouth every 8 (eight) hours as needed for Pain.   Foxnderik Saiyad         traZODone 50 MG Tabs  Commonly known as: Desyrel      Take 4 tablets (200 mg total) by mouth nightly.                Where to Get Your Medications      These medications were sent to [facility] - [place], [place] - [address] [facility] [phone], [phone] [address], [facility] [address]    Phone: [phone]   cyclobenzaprine 10 MG Tabs  Naloxone HCl 4  MG/0.1ML Liqd     Please  your prescriptions at the location directed by your doctor or nurse    Bring a paper prescription for each of these medications  oxyCODONE 10 MG Tabs  traMADol 50 MG Tabs           [id number] - MAR ACTION REPORT  (last 48 hrs)    ** SITE UNKNOWN **     Order ID Medication Name Action Time Action Reason Comments    [id number] HYDROmorphone (Dilaudid) 1 MG/ML injection 0.4 mg (Or Linked Group #1) 02/14/24 2235 Given      [id number] HYDROmorphone (Dilaudid) 1 MG/ML injection 0.4 mg 02/16/24 0436 Given      [id number] cyclobenzaprine (Flexeril) tab 10 mg 02/15/24 1001 Given      [id number] cyclobenzaprine (Flexeril) tab 10 mg 02/15/24 1652 Given      [id number] cyclobenzaprine (Flexeril) tab 10 mg 02/15/24 2231 Given      [id number] cyclobenzaprine (Flexeril) tab 10 mg 02/16/24 0522 Given      [id number] dilTIAZem ER (CardIZEM CD) 24 hr cap 240 mg 02/14/24 2043 Given      [id number] dilTIAZem ER (CardIZEM CD) 24 hr cap 240 mg 02/15/24 2033 Given      [id number] furosemide (Lasix) tab 20 mg 02/15/24 1000 Given      [id number] furosemide (Lasix) tab 20 mg 02/16/24 1031 Given      [id number] lisinopril (Prinivil; Zestril) tab 5 mg 02/15/24 1000 Given      [id number] lisinopril (Prinivil; Zestril) tab 5 mg 02/16/24 1032 Given      [id number] metoprolol succinate ER (Toprol XL) 24 hr tab 100 mg 02/14/24 2043 Given      [id number] metoprolol succinate ER (Toprol XL) 24 hr tab 100 mg 02/15/24 1000 Given      [id number] metoprolol succinate ER (Toprol XL) 24 hr tab 100 mg 02/15/24 2034 Given      [id number] metoprolol succinate ER (Toprol XL) 24 hr tab 100 mg 02/16/24 1031 Given      [id number] oxyCODONE immediate release tab 10 mg (Or Linked Group #2) 02/14/24 2043 Given      [id number] oxyCODONE immediate release tab 10 mg 02/15/24 0203 Given      [id number] oxyCODONE immediate release tab 10 mg 02/15/24 1232 Given      [id number] oxyCODONE immediate release tab 10 mg 02/15/24 1757 Given      [id number] oxyCODONE  immediate release tab 10 mg 02/15/24 2231 Given      [id number] oxyCODONE immediate release tab 10 mg 02/16/24 0522 Given      [id number] traMADol (Ultram) tab 50 mg 02/15/24 0436 Given      [id number] traZODone (Desyrel) tab 200 mg 02/14/24 2043 Given      [id number] traZODone (Desyrel) tab 200 mg 02/15/24 2230 Given              Recent Vital Signs    Flowsheet Row Most Recent Value   /63 Filed at 02/16/2024 1335   Pulse 88 Filed at 02/16/2024 1335   Resp 18 Filed at 02/16/2024 1335   Temp 97.8 °F (36.6 °C) Filed at 02/16/2024 1335   SpO2 97 % Filed at 02/16/2024 1335      Patient's Most Recent Weight    Flowsheet Row Most Recent Value   Patient Weight 223 kg (491 lb 10 oz)         Lab Results Last 24 Hours      Basic Metabolic Panel (8) [[id number]] (Abnormal)  Resulted: 02/16/24 0553, Result status: Final result   Ordering provider: Marquis Del Toro DO  02/15/24 2300 Resulting lab: [facility] ([facility])    Specimen Information    Type Source Collected On   Blood — 02/16/24 0451          Components    Component Value Reference Range Flag Lab   Glucose 129 70 - 99 mg/dL [facility])   Sodium 138 136 - 145 mmol/L — [facility])   Potassium 4.2 3.5 - 5.1 mmol/L — [facility])   Comment: Specimen slightly hemolyzed.       Chloride 104 98 - 112 mmol/L — [facility])   CO2 27.0 21.0 - 32.0 mmol/L — [facility])   Anion Gap 7 0 - 18 mmol/L — [facility])   BUN 15 9 - 23 mg/dL — [facility])   Creatinine 0.76 0.70 - 1.30 mg/dL — [facility] ([facility])   Calcium, Total 8.5 8.5 - 10.1 mg/dL — [facility])   Calculated Osmolality 289 275 - 295 mOsm/kg — [facility])   eGFR-Cr 104 >=60 mL/min/1.73m2 — [facility] ([facility])            CBC With Differential With Platelet [[id number]] (Abnormal)  Resulted: 02/16/24 0532, Result status: Final result   Ordering provider: Marquis Del Toro DO  02/15/24 2300 Resulting lab: [facility] ([facility])   Narrative:  The  following orders were created for panel order CBC With Differential With Platelet.  Procedure                               Abnormality         Status                     ---------                               -----------         ------                     CBC W/ DIFFERENTIAL[[id number]]          Abnormal            Final result                 Please view results for these tests on the individual orders.    Specimen Information    Type Source Collected On   Blood — 24 0451            Testing Performed By     Lab - Abbreviation Name Director Address Valid Date Range    139 - [facility] ([facility]) [facility] ([facility]) Goldberg, Cathryn A. MD [address] [place] [id number] 20 1441 - Present            Microbiology Results (All)     None         H&P - H&P Note      H&P signed by Ariadna Coburn MD at 2024 12:07 PM  Version 1 of 1    Author: Ariadna Coburn MD Service: Hospitalist Author Type: Physician    Filed: 2024 12:07 PM Date of Service: 2024  8:19 AM Status: Signed    : Ariadna Coburn MD (Physician)         [facility]  HOSPITALIST  History and Physical     Deneen Chapa Patient Status:  Observation    1965 MRN [id number]   Location [facility] 3SW-A Attending Ariadna Coburn MD   Hosp Day # 0 PCP Jenna Gutierrez MD     Chief Complaint:   S/p fall with knee pain    History of Present Illness: Deneen Chapa is a 58 year old male with PMH significant for CHF, depression/anxiety, HTN, DL, AYANNA, morbid obesity presents to the ED after a mechanical fall.  Patient slipped on now complaining of bilateral knee pain.  Denies any head pain or trauma.  No back pain or neck pain.  Says he was wearing leather sandals that caught on the ground.  In the ED routine labs show no abnormalities.  X-rays of the knees negative for acute fracture or dislocation.  Patient admitted for pain control and difficulty ambulating.    Past Medical History:  Past Medical  History:   Diagnosis Date    Alcohol abuse     last drink Jan 2020    Anxiety     [facility]    Arrhythmia     Atrial fibrillation (HCC)     EP cards: Dr. Sumeet Colon, PAroxysmal AFIB    Calculus of kidney     Congestive heart failure (CHF) (HCC)     Depression     [facility] psych: Dr. Denny--currently on wellbutrin as of 3/28/22, tried zoloft and didn't help    High blood pressure     Hyperlipidemia     Morbid obesity (HCC)     Obstructive sleep apnea     Prediabetes     Sleep apnea     Vertigo     Dr. Kallie Acevedo----Mirtazepine        Past Surgical History:   Past Surgical History:   Procedure Laterality Date    APPENDECTOMY      COLONOSCOPY      JAW SURGERY      OTHER SURGICAL HISTORY  02/2018, 2020    cardioversion, Dr. Sumeet Colon---2020    OTHER SURGICAL HISTORY  05/07/2019    Cysto-Dr. Timmons       Social History:  reports that he has never smoked. He has never used smokeless tobacco. He reports that he does not drink alcohol and does not use drugs.    Family History:   Family History   Problem Relation Age of Onset    Aldara Father     No Known Problems Mother         Allergies: No Known Allergies    Medications:    No current facility-administered medications on file prior to encounter.     Current Outpatient Medications on File Prior to Encounter   Medication Sig Dispense Refill    traZODone 50 MG Oral Tab Take 4 tablets (200 mg total) by mouth nightly.      dilTIAZem HCl ER Coated Beads 360 MG Oral Capsule SR 24 Hr Take 1 capsule (360 mg total) by mouth daily.      lisinopril 5 MG Oral Tab Take 1 tablet (5 mg total) by mouth daily. 30 tablet 0    furosemide 20 MG Oral Tab Take 1 tablet (20 mg total) by mouth daily. 30 tablet 0    TRAMADOL HCL  MG Oral Tablet 24 Hr TAKE 1 TABLET BY MOUTH 3  TIMES DAILY AS NEEDED FOR  PAIN 30 tablet 0    metoprolol succinate 100 MG Oral Tablet 24 Hr Take 1 tablet (100 mg total) by mouth 2 (two) times a day. 180 tablet 0    Magnesium 100 MG Oral Cap Take 1 tablet by  mouth 2 (two) times a day.      Rivaroxaban 20 MG Oral Tab Take 1 tablet (20 mg total) by mouth daily with food.      zolpidem 10 MG Oral Tab Take 1 tablet (10 mg total) by mouth nightly as needed for Sleep. 20 tablet 0       Review of Systems:   A comprehensive 14 point review of systems was completed.    Pertinent positives and negatives noted in the HPI.    Physical Exam:    /70 (BP Location: Right arm)   Pulse 88   Temp 97.9 °F (36.6 °C) (Oral)   Resp 18   Ht 5' 10\" (1.778 m)   Wt (!) 570 lb (258.6 kg)   SpO2 97%   BMI 81.79 kg/m²   General: No acute distress. Alert and oriented x 3.  HEENT: Normocephalic atraumatic. Moist mucous membranes. EOM-I. PERRLA. Anicteric.  Neck: No lymphadenopathy. No JVD. No carotid bruits.  Respiratory: Clear to auscultation bilaterally. No wheezes. No rhonchi.  Cardiovascular: S1, S2. Regular rate and rhythm. No murmurs, rubs or gallops. Equal pulses.   Chest and Back: No tenderness or deformity.  Abdomen: Soft, nontender, nondistended.  Positive bowel sounds. No rebound, guarding or organomegaly.  Neurologic: No focal neurological deficits. CNII-XII grossly intact.  Musculoskeletal: Moves all extremities.  Extremities: No edema or cyanosis.  Integument: No rashes or lesions.   Psychiatric: Appropriate mood and affect.      Diagnostic Data:      Labs:  Recent Labs   Lab 02/11/24  0501   WBC 9.4   HGB 14.7   MCV 93.8   .0       Recent Labs   Lab 02/11/24  0501   *   BUN 15   CREATSERUM 0.86   CA 8.8      K 4.2      CO2 25.0       Estimated Creatinine Clearance: 95.5 mL/min (based on SCr of 0.86 mg/dL).    No results for input(s): \"PTP\", \"INR\" in the last 168 hours.    COVID-19 Lab Results    COVID-19  Lab Results   Component Value Date    COVID19 Not Detected 04/07/2023    COVID19 Not Detected 01/13/2022    COVID19 Not Detected 12/09/2021       Pro-Calcitonin  No results for input(s): \"PCT\" in the last 168 hours.    Cardiac  No results for  input(s): \"TROP\", \"PBNP\" in the last 168 hours.    Creatinine Kinase  No results for input(s): \"CK\" in the last 168 hours.    Inflammatory Markers  No results for input(s): \"CRP\", \"DEEPAK\", \"LDH\", \"DDIMER\" in the last 168 hours.    No results for input(s): \"TROP\", \"TROPHS\", \"CK\" in the last 168 hours.    Imaging: Imaging data reviewed in Epic.      ASSESSMENT / PLAN:   Deneen Chapa is a 58 year old male with PMH significant for CHF, depression/anxiety, HTN, DL, AYANNA, morbid obesity presents to the ED after a mechanical fall.  Patient slipped on now complaining of bilateral knee pain.    Mechanical fall w/ shannon knee pain  -no fractures or dislocation on xray  -pain control as ordered  -supportive measure  -PT/OT assessment pending  -may need CT assessment/MRI right knee specifically, will defer to ortho  -pt unable to stand, knee buckling >> ortho consulted    Supermorbid obese  -spent long discussion with pt regarding wt loss and need to reduce wt asap  -he briefly tried metformin   -has not tried any new meds or d/w PCP   -would encourage a strict change in diet and if possible/qualifies/affords new injectable wt loss meds such as ozempic/wegovy/mounjaro    HTN  -resume home meds    DL  -statin    AYANNA  -cpap     Depression/anxiety  -resume home meds      Quality:  DVT Prophylaxis: lovenox scds  CODE status: full code  Medeiros: no  If COVID testing is negative, may discontinue isolation: yes     Plan of care discussed with patient and all questions answered.    DISPO  Pain control  Ortho consult      Ariadna Coburn MD  [facility] Hospitalist  Pager [phone]  Answering Service number: [phone]                         Electronically signed by Ariadna Coburn MD on 2/11/2024 12:07 PM              Consults - MD Consult Notes      Consults signed by Sumeet Cao MD at 2/11/2024  2:34 PM     Author: Sumeet Cao MD Service: Orthopedics Author Type: Physician    Filed: 2/11/2024  2:34 PM Date of Service: 2/11/2024  2:28 PM  Status: Signed    : Sumeet Cao MD (Physician)     Consult Orders    1. Consult to Orthopedic Surgery [[id number]] ordered by Ariadna Coburn MD at 24 [address] [place] [address]    Report of Consultation    Deneen Chapa Patient Status:  Observation    1965 MRN [id number]   [facility] 3SW-A Attending Ariadna Coburn MD   Hosp Day # 0 PCP Jenna Gutierrez MD     Date of Admission:  2/10/2024  Date of Consult:  24    Reason for Consultation:  Bilateral knee pain following fall    History of Present Illness:  Deneen Chapa is a a(n) 58 year old male who presented for evaluation of bilateral knee pain following a fall suffered yesterday.  Patient states that he tripped over carpet at his home and had fall from standing after which she had immediate onset right worse than left knee pain.  He states that there was a coronal plane force transferred across the knees as a result of the fall.  He denies any numbness, tingling distally in the extremity.  He denies any feelings of coolness or temperature variance distally in the extremities bilaterally.  He has no known prior history of injury to the knees.  He attempted to work with physical therapy today but upon attempting to put weight on the extremities he felt as though the right leg was extremely unstable through the knee and had discomfort in the left.  He is unable to progress and as such is consulted for further evaluation.    History:  Past Medical History:   Diagnosis Date    Alcohol abuse     last drink 2020    Anxiety     [facility] Dr. Denny    Arrhythmia     Atrial fibrillation (HCC)     EP cards: Dr. Sumeet Colon, PAroxysmal AFIB    Calculus of kidney     Congestive heart failure (CHF) ([facility])     Depression     [facility] psych: Dr. Denny--currently on wellbutrin as of 3/28/22, tried zoloft and didn't help    High blood pressure     Hyperlipidemia     Morbid obesity (HCC)     Obstructive sleep apnea     Prediabetes      Sleep apnea     Vertigo     Dr. Kallie Acevedo----Mirtazepine     Past Surgical History:   Procedure Laterality Date    APPENDECTOMY      COLONOSCOPY      JAW SURGERY      OTHER SURGICAL HISTORY  02/2018, 2020    cardioversion, Dr. Sumeet Colon---2020    OTHER SURGICAL HISTORY  05/07/2019    Cysto-Dr. Timmons     Family History   Problem Relation Age of Onset    Eloise Father     No Known Problems Mother       reports that he has never smoked. He has never used smokeless tobacco. He reports that he does not drink alcohol and does not use drugs.    Allergies:  No Known Allergies    Medications:    Current Facility-Administered Medications:     HYDROmorphone (Dilaudid) 1 MG/ML injection 0.2 mg, 0.2 mg, Intravenous, Q2H PRN **OR** HYDROmorphone (Dilaudid) 1 MG/ML injection 0.4 mg, 0.4 mg, Intravenous, Q2H PRN **OR** HYDROmorphone (Dilaudid) 1 MG/ML injection 0.8 mg, 0.8 mg, Intravenous, Q2H PRN    morphINE PF 2 MG/ML injection 1 mg, 1 mg, Intravenous, Q2H PRN **OR** morphINE PF 2 MG/ML injection 2 mg, 2 mg, Intravenous, Q2H PRN **OR** morphINE PF 4 MG/ML injection 4 mg, 4 mg, Intravenous, Q2H PRN    melatonin tab 3 mg, 3 mg, Oral, Nightly PRN    polyethylene glycol (PEG 3350) (Miralax) 17 g oral packet 17 g, 17 g, Oral, Daily PRN    sennosides (Senokot) tab 17.2 mg, 17.2 mg, Oral, Nightly PRN    bisacodyl (Dulcolax) 10 MG rectal suppository 10 mg, 10 mg, Rectal, Daily PRN    fleet enema (Fleet) 7-19 GM/118ML rectal enema 133 mL, 1 enema, Rectal, Once PRN    ondansetron (Zofran) 4 MG/2ML injection 4 mg, 4 mg, Intravenous, Q6H PRN    prochlorperazine (Compazine) 10 MG/2ML injection 5 mg, 5 mg, Intravenous, Q8H PRN    acetaminophen (Tylenol Extra Strength) tab 500 mg, 500 mg, Oral, Q4H PRN    traMADol (Ultram) tab 50 mg, 50 mg, Oral, Q6H PRN    dilTIAZem ER (Dilacor XR) 24 hr cap 360 mg, 360 mg, Oral, Daily    furosemide (Lasix) tab 20 mg, 20 mg, Oral, Daily    lisinopril (Prinivil; Zestril) tab 5 mg, 5 mg, Oral, Daily     metoprolol succinate ER (Toprol XL) 24 hr tab 100 mg, 100 mg, Oral, 2x Daily(Beta Blocker)    rivaroxaban (Xarelto) tab 20 mg, 20 mg, Oral, Daily with food    traZODone (Desyrel) tab 200 mg, 200 mg, Oral, Nightly    Physical Exam:    General: Alert, orientated x3.  Cooperative.  No apparent distress.  Vital Signs:  Blood pressure 106/69, pulse 77, temperature 97.8 °F (36.6 °C), temperature source Oral, resp. rate 18, height 5' 10\" (1.778 m), weight (!) 570 lb (258.6 kg), SpO2 97%.  BLE:  No gross deformity, skin intact.  There is swelling present across the right knee as well as a 2+ right knee effusion.  He has limitations of bilateral knee range of motion and is unable to tolerate flexion past 30 degrees.  On the left knee he has tenderness along the MCL tract with 2+ widening and pain to valgus stress.  On the right knee has tenderness laterally along the tibial plateau eminence and has 2+ instability to varus stress.  Difficult to assess Lachman's exam on bilateral knees due to discomfort but concern for laxity on the right lower extremity.  He has sensation intact distally as well as appropriate firing of motor myotomes distally.  Difficult to assess DP and PT pulses due to habitus but appropriate appearance and capillary refill to lower extremities bilaterally.    Laboratory Data:  Radiographs of the left knee demonstrates no acute fracture, osseous abnormality, or dislocation.    2 views of the right knee demonstrate abnormality the lateral plateau concerning for possible anterior lateral capsular avulsion (Segond fracture) consistent with possible anterior cruciate ligament tear.  No discrete fracture of the plateau visualized.    Impression and Plan:  Patient Active Problem List   Diagnosis    Alcohol abuse    Atrial fibrillation ([facility])    Hyperlipidemia, unspecified hyperlipidemia type    Morbid obesity with BMI of 60.0-69.9, adult ([facility])    Recurrent major depressive disorder, in full remission ([facility])     Sensorineural hearing loss (SNHL) of both ears    Obstructive sleep apnea    Chronic diastolic CHF (congestive heart failure) (HCC)    Secondary hypercoagulable state (HCC)    Vestibular migraine    Primary hypertension    Acute chest pain    FRANKS (dyspnea on exertion)    Azotemia    Hyperglycemia    Atrial fibrillation, chronic (HCC)    Contusion of knee, unspecified laterality, initial encounter    Inability to ambulate due to knee       58-year-old male with morbid obesity and several other comorbidities who suffered a fall from standing yesterday with significant bilateral lower extremity pain through the knees particular the right lower extremity.    -Had a long discussion with the patient today that at his elevated BMI with his injury mechanism I have significant concern for ultralow velocity knee dislocation, particularly to the right knee.  We discussed that the left knee is likely more consistent with an isolated MCL sprain.  I would recommend bilateral knee MRI and right knee CT scan to better evaluate for ligamentous injury/injury burden as well as rule out evidence of tibial plateau fracture of the right knee given the irregularity seen on right knee radiograph.  -Nonweightbearing bilateral lower extremity  -Knee immobilizer bilateral lower extremity  -Cleared to continue ankle pumps as well as work on active and passive knee range of motion as he is able though would do so in a nonweightbearing manner  -Will continue to follow, further recommendations pending imaging findings    Sumeet Cao MD  2/11/2024  2:29 PM    Electronically signed by Sumeet Cao MD on 2/11/2024  2:34 PM           D/C Summary    No notes of this type exist for this encounter.     Imaging Results (HF patients)    Chest X-Ray Results (HF patients only)    No exam resulted this encounter.      2D Echocardiogram Results ([facility] patients only)    No exam resulted this encounter.      Cath Angiogram Results ([facility] Patients only)    No exam  resulted this encounter.          Physical Therapy Notes (last 72 hours)      Physical Therapy Note signed by Irasema Garay PTA at 2/15/2024  1:11 PM  Version 1 of 1    Author: Irasema Garay PTA Service: Rehab Author Type: Physical Therapy Assistant    Filed: 2/15/2024  1:11 PM Date of Service: 2/15/2024 12:55 PM Status: Signed    : Irasema Garay PTA (Physical Therapy Assistant)          PHYSICAL THERAPY TREATMENT NOTE - INPATIENT    Room Number: 365/365-A     Session: 2     Number of Visits to Meet Established Goals: 5    Presenting Problem: Fall, bilateral knee pain  Co-Morbidities : Morbid obesity, chronic afib, chronic diastolic CHF, HTN, HLD, DM2, AYANNA    ASSESSMENT   Patient demonstrates good  progress this session, goals  remain in progress.    Patient continues to function below baseline with bed mobility, transfers, and gait.  Contributing factors to remaining limitations include decreased functional strength, pain, medical status, and NWB BLE  .  Next session anticipate patient to progress bed mobility and transfers.  Physical Therapy will continue to follow patient for duration of hospitalization.    Patient continues to benefit from continued skilled PT services: to promote return to prior level of function and safety with continuous assistance and gradual rehabilitative therapy .    PLAN  PT Treatment Plan: Bed mobility;Body mechanics;Endurance;Energy conservation;Family education;Patient education;Gait training;Balance training;Transfer training;Strengthening;Range of motion  Rehab Potential : Guarded  Frequency (Obs): 3x/week    Prior Level of [place]: Pt typically indep with ADLs and mobility. Gets short of breath with increased activity.        CURRENT GOALS       Goal #1 Patient is able to demonstrate supine - sit EOB @ level: modified independent      Goal #2 Patient is able to demonstrate transfers EOB to/from Chair/Wheelchair at assistance level: moderate assistance           Goal Comments: Goals established on 2/11/2024    2/15/2024 all goals ongoing    SUBJECTIVE    \"Wow this went well\"    OBJECTIVE  Precautions: Bed/chair alarm    WEIGHT BEARING RESTRICTION  Weight Bearing Restriction: None                PAIN ASSESSMENT   Rating: Unable to rate  Location: R knee > L knee  Management Techniques: Breathing techniques;Repositioning    BALANCE                                                                                                                       Static Sitting: Fair -  Dynamic Sitting: Fair -           Static Standing: Not tested  Dynamic Standing: Not tested    ACTIVITY TOLERANCE                         O2 WALK         AM-PAC '6-Clicks' INPATIENT SHORT FORM - BASIC MOBILITY  How much difficulty does the patient currently have...  Patient Difficulty: Turning over in bed (including adjusting bedclothes, sheets and blankets)?: A Lot   Patient Difficulty: Sitting down on and standing up from a chair with arms (e.g., wheelchair, bedside commode, etc.): Unable   Patient Difficulty: Moving from lying on back to sitting on the side of the bed?: A Lot   How much help from another person does the patient currently need...   Help from Another: Moving to and from a bed to a chair (including a wheelchair)?: Total   Help from Another: Need to walk in hospital room?: Total   Help from Another: Climbing 3-5 steps with a railing?: Total       AM-PAC Score:  Raw Score: 8   Approx Degree of Impairment: 86.62%   Standardized Score (AM-PAC Scale): 28.58   CMS Modifier (G-Code): CM    FUNCTIONAL ABILITY STATUS  Gait Assessment   Functional Mobility/Gait Assessment  Gait Assistance: Not tested    Skilled Therapy Provided    Chart reviewed: per therapist's communication c Dr. Cao, pt is to  be NWB BLE, KI on at all times except when working with therapy and is approved for AROM/PROM BLE and can sit EOB sans KI.     Per chart 2/15/2024 - Pt with new updated body weight of 491lb and 10oz.   Cleared for use of ceiling lift    Bed Mobility:    Rolling: CGA    Supine<>Sit: Min A x 2  Sit<>Supine: Min A x 2    Therapist's Comments: writer brought in stoop step in order to safely sit on EOB - Pt required increased time for all mobility, and prefers to \"mentally prepare\" before each move.     Pt able to roll R/L to place lizbeth lift sling - Pt transferred up to chair - required two person assist to stabilize patient in sling and elevate RLE on foot rest d/t body habitus.      Pt able to tolerate >30min in chair, then transferred back to bed.        THERAPEUTIC EXERCISES  Lower Extremity Ankle pumps  Glut sets  Hip AB/AD  Heel slides  Knee extension  Quad sets  SLR  AAROM     Upper Extremity OH Reaching and scooting up in bed c head board      Position Supine     Repetitions   10   Sets   1     Patient End of Session: In bed;Needs met;Call light within reach;RN aware of session/findings;All patient questions and concerns addressed;Alarm set    PT Session Time: 90 minutes  Therapeutic Activity: 45 minutes  Therapeutic Exercise: 10 minutes   Neuromuscular Re-education: 15 minutes                        Occupational Therapy Notes (last 72 hours)      Occupational Therapy Note signed by Tam Real OT at 2/15/2024  1:18 PM  Version 1 of 1    Author: Tam Real OT Service: Rehab Author Type: Occupational Therapist    Filed: 2/15/2024  1:18 PM Date of Service: 2/15/2024  9:45 AM Status: Signed    : Tam Real OT (Occupational Therapist)       OCCUPATIONAL THERAPY TREATMENT NOTE - INPATIENT     Room Number: 365/365-A  Session: 2  Number of Visits to Meet Established Goals: 5    Presenting Problem: fall, B knee pain  Prior Level of Function: Patient reports independent in all I/ADL and functional mobility without device prior to admission. States was requiring increased time due to baseline decreased endurance/SOB, but managed everything independently.    ASSESSMENT   Patient demonstrates good   progress this session, goals remain in progress.    Patient continues to function below baseline with lower body dressing, bed mobility, transfers, stating sitting balance, dynamic sitting balance, and aerobic capacity.   Contributing factors to remaining limitations include decreased functional strength, pain, impaired sitting balance, decreased muscular endurance, medical status, decreased insight to deficits, and decreased safety awareness.  Next session anticipate patient to progress bed mobility and transfers.  Occupational Therapy will continue to follow patient for duration of hospitalization.    Patient continues to benefit from continued skilled OT services: to promote return to prior level of function and safety with continuous assistance and gradual rehabilitative therapy .            History: Patient is a 58 year old male admitted on 2/10/2024 with Presenting Problem: fall, B knee pain. B knee xrays negative for fracture. Patient reports no history of knee pain or buckling. Co-Morbidities : Morbid obesity, chronic afib, chronic diastolic CHF, HTN, HLD, DM2, AYANNA    Chart reviewed: per therapist's communication c Dr. Cao, pt is to be NWB BLE, KI on at all times except when working with therapy and is approved for AROM/PROM BLE and can sit EOB sans KI.      Per chart 2/15/2024 - Pt with new updated body weight of 491lb and 10oz.  Cleared for use of ceiling lift    WEIGHT BEARING RESTRICTION  Weight Bearing Restriction: None               TREATMENT SESSION:  Patient Start of Session: semi supine in bed  FUNCTIONAL TRANSFER ASSESSMENT  Sit to Stand: Edge of Bed  Edge of Bed: Not Tested    BED MOBILITY  Rolling: Contact Guard Assist  Supine to Sit : Minimal Assist  Sit to Supine (OT): Not Tested    BALANCE ASSESSMENT  Static Sitting: Stand-by Assist  Sitting Unilateral: Stand-by Assist  Static Standing: Not Tested    FUNCTIONAL ADL ASSESSMENT       ACTIVITY TOLERANCE: WFL                         O2  SATURATIONS       EDUCATION PROVIDED  Patient: Role of Occupational Therapy; Plan of Care; Discharge Recommendations; Functional Transfer Techniques; Fall Prevention; Weight Bear Status; Posture/Positioning; Energy Conservation; Proper Body Mechanics  Patient's Response to Education: Verbalized Understanding; Returned Demonstration; Requires Further Education      Therapist comments: Discussed with pt for focus on tx session this date with focus on bed mobility sitting EOB and transferring to chair via total lift. Co-tx performed with PTA. Pt initially declining mobility stating he didn't have a good nights sleep and perseverating on complaints on lack of R knee mobility and needing to mentally prepare for each movement. Pt provided with extensive education on benefits of mobility and trialing use of lizbeth lift to get into the chair to further progress with skilled therapy needs. Pt with fair understanding but continues to delay EOB transfer to mentally prepare. Pt provided with R knee PROM flexion/extension to increase ROM/joint mobility and to decrease risk for further swelling to improve bed mobility and ADLs. Pt then performs bed mobility at min A x 2 to sit EOB with increased time and R LE support. Pt attempting to scoot further EOB with pt educated on type of bed he is in and the further he is to scoot, the higher risk it will be that he will slide off EOB. Pt with limited/poor understanding, but continued to reinforce maintaining where he is. 6-8 inch stoop placed underneath pt's feet to increase stability/SARIKA. Pt with improved sitting balance and max education provided to maintain NWB to B LE in hopes for further progressing with UB ADLs/grooming tasks. Pt agreeable to trial sitting up in chair with transfer via ceiling lift. Pt returns to supine in bed at mod A x 1 with assist required for B LE onto bed and pt able to reposition up to HOB with use of headboard. Pt rolls side to side requiring CGA for total  lift sling to be placed under pt. Pt transfers from bed to chair via lizbeth lift x 2 person assist. Pt educated on positioning/placement of B LE to adhere to NWB precautions. This writer educated nursing staff on transfer with use of ceiling lift and position in chair. Pt left with PCT/RN. Returned in later morning and assisted pt back into bed via ceiling lift.    Patient End of Session: Up in chair;With  staff;Needs met;Call light within reach;RN aware of session/findings;All patient questions and concerns addressed;Alarm set    SUBJECTIVE  \"I just didn't have a good night's sleep.\"  \"Please, just bear with me.\" - pt mentally preparing for bed mobility and transfer    PAIN ASSESSMENT  Ratin  Location: R knee  Management Techniques: Activity promotion;Body mechanics;Breathing techniques;Relaxation;Repositioning;Ice;Nurse notified     OBJECTIVE  Precautions: Bed/chair alarm    AM-PAC ‘6-Clicks’ Inpatient Daily Activity Short Form  -   Putting on and taking off regular lower body clothing?: Total  -   Bathing (including washing, rinsing, drying)?: A Lot  -   Toileting, which includes using toilet, bedpan or urinal? : Total  -   Putting on and taking off regular upper body clothing?: A Little  -   Taking care of personal grooming such as brushing teeth?: A Little  -   Eating meals?: None    AM-PAC Score:  Score: 14  Approx Degree of Impairment: 59.67%  Standardized Score (AM-PAC Scale): 33.39    PLAN  OT Treatment Plan: Balance activities;ADL training;Functional transfer training;Endurance training;Patient/Family education;Patient/Family training;Compensatory technique education  Rehab Potential : Good  Frequency: 3x/week    OT Goals:     All goals ongoing 02/15    ADL Goals   Patient will perform lower body dressing:  with mod assist  Patient will perform toileting: with mod assist     Functional Transfer Goals  Patient will transfer from supine to sit:  with supervision  Patient will transfer from sit to stand:   with mod assist  Patient will tolerate sidesteps along edge of bed with min x2 for safety in preparation for commode transfer [progress as tolerated when no longer buckling]    OT Session Time: 90 minutes  Therapeutic Activity: 75 minutes                 Video Swallow Study Notes    No notes of this type exist for this encounter.     SLP Notes    No notes of this type exist for this encounter.     Immunizations     Name Date      Covid-19 Pfizer 03/18/21     Covid-19 Pfizer 02/25/21     Fluad 0.5ml 01/13/21     INFLUENZA 10/18/21     INFLUENZA 01/13/21     INFLUENZA 09/30/19     INFLUENZA 03/11/19     INFLUENZA 12/27/17     INFLUENZA defer-12/12/17     Deferral:      INFLUENZA 09/22/17     Influenza Virus Vaccine - Whole 03/11/19     Influenza Virus Vaccine - Whole 09/22/17     Pneumococcal (Prevnar 13) 01/13/21     Pneumovax 23 09/22/17       Multidisciplinary Problems     Active Goals        Problem: Patient/Family Goals    Goal Priority Disciplines Outcome Interventions   Patient/Family Long Term Goal     Interdisciplinary Progressing    Description: Patient's Long Term Goal: Prevent anymore falls in future    Interventions:  - Switch shoes  - See additional Care Plan goals for specific interventions   Patient/Family Short Term Goal     Interdisciplinary Progressing    Description: Patient's Short Term Goal: Walk on O/S    Interventions:   - PT/OT  - See additional Care Plan goals for specific interventions

## (undated) NOTE — ED AVS SNAPSHOT
Osbaldo Ng   MRN: [id number]    Department:  [facility] Emergency Department   Date of Visit:  2/8/2019           Disclosure     Insurance plans vary and the physician(s) referred by the ER may not be covered by your plan.  Please contact CARE PHYSICIAN AT ONCE OR RETURN IMMEDIATELY TO THE EMERGENCY DEPARTMENT. If you have been prescribed any medication(s), please fill your prescription right away and begin taking the medication(s) as directed.   If you believe that any of the medications

## (undated) NOTE — LETTER
[facility] [address], [address]  Consent for Procedure/Sedation    Date: 12/2/21    Time: 1300      1. I authorize the performance upon Osbaldo Ng the following:  Cardioversion     2.  I authorize Dr. Karen Pineda (and whomever is Medical Record #: [id number]

## (undated) NOTE — ED AVS SNAPSHOT
Kavon Mullins   MRN: [id number]    Department:  [facility] Emergency Department   Date of Visit:  11/21/2017           Disclosure     Insurance plans vary and the physician(s) referred by the ER may not be covered by your plan.  Please contac CARE PHYSICIAN AT ONCE OR RETURN IMMEDIATELY TO THE EMERGENCY DEPARTMENT. If you have been prescribed any medication(s), please fill your prescription right away and begin taking the medication(s) as directed.   If you believe that any of the medications

## (undated) NOTE — IP AVS SNAPSHOT
Patient Demographics     Address  [address]  [facility] [address] Phone  [phone] (Home)  [phone] (Mobile) *Preferred* E-mail Address  [email or web address]      Patient Contacts     Name Relation Home Work Mobile    Sandra Powers Friend [phone]    Deneen Chapa Father   [phone]      Allergies as of 3/15/2024  Review status set to Review Complete on 3/11/2024   No Known Allergies      Noted Reaction Type Reactions    DELETED: Amlodipine 07/18/2017    MYALGIA    DELETED: Hydrocodone 05/26/2016    UNKNOWN    Extreme nausea    DELETED: Losartan 07/18/2017    PAIN    DELETED: Metoprolol 07/18/2017    PAIN      Code Status Information     Code Status    Full Code        Patient Instructions       Complete course of oral antibiotics for pneumonia    Pt has diabetes with HgbA1C of 7.6,. recommend wt loss and ada diet. Metformin 500 mg daily added plus ssi prn, adjust as needed    Pt has follow up ortho appt on 3/18 in [place] as previously scheduled      Follow-up Information     Jenna Gutierrez MD Follow up.    Specialty: Internal Medicine  Why: within 1 week of discharge from rehab  Contact information:  [address]  [address]  [facility] [address] [phone]             Sumeet Cao MD Follow up on 3/18/2024.    Specialty: SURGERY, ORTHOPEDIC  Why: 3/18 9:45am  Contact information:  [address]  [address]  [facility] [address] [phone]                        Your Home Meds List      TAKE these medications       Instructions Authorizing Provider Morning Afternoon Evening As Needed   acetaminophen 325 MG Tabs  Commonly known as: Tylenol      Take 2 tablets (650 mg total) by mouth every 6 (six) hours as needed.   Susu A Bertoni         aspirin 81 MG Tbec  Next dose due: Tomorrow      Take 1 tablet (81 mg total) by mouth daily.          benzonatate 200 MG Caps  Commonly known as: Tessalon      Take 1 capsule (200 mg total) by mouth 3 (three) times  daily as needed for cough.   Susu MONICA Cameron         cefuroxime 500 MG Tabs  Commonly known as: Ceftin  Start taking on: March 16, 2024  Next dose due: Tonight      Take 1 tablet (500 mg total) by mouth every 12 (twelve) hours for 3 doses.  Stop taking on: March 18, 2024   Susu A Mannyoni         digoxin 0.125 MG Tabs  Commonly known as: Lanoxin  Next dose due: Tomorrow      Take 1 tablet (125 mcg total) by mouth daily.   Susu A Mannyoni         dilTIAZem HCl 120 MG Tabs  Commonly known as: CARDIZEM  Next dose due: Evening      Take 1 tablet (120 mg total) by mouth every 6 (six) hours.   Susu A Bertoni         furosemide 20 MG Tabs  Commonly known as: Lasix  Next dose due: Tomorrow      Take 2 tablets (40 mg total) by mouth daily.   Susu A Rakesh         guaiFENesin 100 MG/5 ML  Commonly known as: Robitussin      Take 5 mL (100 mg total) by mouth every 8 (eight) hours as needed (cough).          insulin aspart 100 Units/mL Sopn  Commonly known as: NovoLOG      Inject 1-5 Units into the skin 3 (three) times daily with meals. CORRECTION FACTOR - LOW DOSE Continue to give correction insulin even if NPO DO NOT HOLD OR ALTER INSULIN DOSE WITHOUT A PHYSICIAN ORDER Give 1 unit for blood glucose 160-200 mg/dL Give 2 units for blood glucose 201-240 mg/dL Give 3 units for blood glucose 241-280 mg/dL Give 4 units for blood glucose 281-320 mg/dL Give 5 units for blood glucose 321-360 mg/dL Call physician if blood glucose is greater than 360 mg/dL with time and last dose of correction insulin given.   Susu A Bertoni         metFORMIN 500 MG Tabs  Commonly known as: Glucophage  Next dose due: Tomorrow      Take 1 tablet (500 mg total) by mouth daily with breakfast.   Susu A Bertoni         metoprolol succinate  MG Tb24  Commonly known as: Toprol XL  Next dose due: Tonight      Take 1 tablet (100 mg total) by mouth in the morning and 1 tablet (100 mg total) before bedtime.          Polyethylene Glycol 3350 17 g Pack  Commonly  known as: MIRALAX      Take 17 g by mouth daily as needed (If no bowel movement in last 24 hours).   Marquis Bradleyibradley         rivaroxaban 20 MG Tabs  Commonly known as: Xarelto  Next dose due: Tomorrow      Take 1 tablet (20 mg total) by mouth daily with food.          Sennosides 17.2 MG Tabs      Take 1 tablet (17.2 mg total) by mouth nightly as needed (constipation, as needed if no bowel movement that day).   Marquis Bradleyibradley         traMADol 50 MG Tabs  Commonly known as: Ultram      Take 2 tablets (100 mg total) by mouth every 8 (eight) hours as needed for Pain.  Stop taking on: March 20, 2024   Susu Cameron         traZODone 100 MG Tabs  Commonly known as: Desyrel  Next dose due: Tonight      Take 2 tablets (200 mg total) by mouth nightly.                Where to Get Your Medications      Please  your prescriptions at the location directed by your doctor or nurse    Bring a paper prescription for each of these medications  cefuroxime 500 MG Tabs  traMADol 50 MG Tabs           [phone] - MAR ACTION REPORT  (last 48 hrs)    ** SITE UNKNOWN **     Order ID Medication Name Action Time Action Reason Comments    [id number] aspirin DR tab 81 mg 03/14/24 0904 Given      [id number] aspirin DR tab 81 mg 03/15/24 0816 Given      [id number] benzonatate (Tessalon) cap 200 mg 03/14/24 2312 Given      [id number] cefTRIAXone (Rocephin) 2 g in D5W 100 mL IVPB-ADD 03/13/24 1442 New Bag      [id number] cefuroxime (Ceftin) tab 500 mg 03/14/24 0904 Given      [id number] cefuroxime (Ceftin) tab 500 mg 03/14/24 2131 Given      [id number] cefuroxime (Ceftin) tab 500 mg 03/15/24 0817 Given      [id number] cyclobenzaprine (Flexeril) tab 10 mg 03/15/24 0554 Given      [id number] digoxin (Lanoxin) tab 125 mcg 03/14/24 0905 Given      [id number] digoxin (Lanoxin) tab 125 mcg 03/15/24 0818 Given      [id number] dilTIAZem (cardIZEM) tab 120 mg 03/14/24 0539 Given by Other      [id number] dilTIAZem (cardIZEM) tab 120 mg 03/14/24 1239 Given       [id number] dilTIAZem (cardIZEM) tab 120 mg 03/14/24 1748 Given      [id number] dilTIAZem (cardIZEM) tab 120 mg 03/15/24 0025 Given      [id number] dilTIAZem (cardIZEM) tab 120 mg 03/15/24 0549 Given      [id number] dilTIAZem (cardIZEM) tab 120 mg 03/15/24 1212 Given      [id number] furosemide (Lasix) tab 40 mg 03/14/24 0905 Given      [id number] furosemide (Lasix) tab 40 mg 03/15/24 0818 Given      [id number] guaiFENesin (Robitussin) 100 MG/5 ML oral liquid 100 mg 03/14/24 0913 Given      [id number] guaiFENesin (Robitussin) 100 MG/5 ML oral liquid 100 mg 03/14/24 2130 Given      [id number] guaiFENesin (Robitussin) 100 MG/5 ML oral liquid 100 mg 03/15/24 0549 Given      [id number] ipratropium-albuterol (Duoneb) 0.5-2.5 (3) MG/3ML inhalation solution 3 mL 03/14/24 2219 Given      [id number] ipratropium-albuterol (Duoneb) 0.5-2.5 (3) MG/3ML inhalation solution 3 mL 03/15/24 0632 Given      [id number] metFORMIN (Glucophage) tab 500 mg 03/14/24 1239 Given      [id number] metFORMIN (Glucophage) tab 500 mg 03/15/24 0817 Given      [id number] metoprolol succinate ER (Toprol XL) 24 hr tab 100 mg 03/13/24 2059 Given      [id number] metoprolol succinate ER (Toprol XL) 24 hr tab 100 mg 03/14/24 0905 Given      [id number] metoprolol succinate ER (Toprol XL) 24 hr tab 100 mg 03/14/24 2131 Given      [id number] metoprolol succinate ER (Toprol XL) 24 hr tab 100 mg 03/15/24 0818 Given      [id number] rivaroxaban (Xarelto) tab 20 mg 03/14/24 0905 Given      [id number] rivaroxaban (Xarelto) tab 20 mg 03/15/24 0816 Given      [id number] traMADol (Ultram) tab 100 mg 03/13/24 1742 Given      [id number] traMADol (Ultram) tab 100 mg 03/14/24 0724 Given      [id number] traMADol (Ultram) tab 100 mg 03/14/24 2312 Given      [id number] traMADol (Ultram) tab 100 mg 03/15/24 0816 Given      [id number] traZODone (Desyrel) tab 200 mg 03/13/24 2258 Given      [id number] traZODone (Desyrel) tab 200 mg 03/15/24 0026 Given              Recent Vital Signs    Flowsheet Row Most  Recent Value   /77 Filed at 03/15/2024 1212   Pulse 81 Filed at 03/15/2024 1212   Resp 20 Filed at 03/15/2024 0815   Temp 98.1 °F (36.7 °C) Filed at 03/15/2024 0815   SpO2 90 % Filed at 03/15/2024 0815      Patient's Most Recent Weight    Flowsheet Row Most Recent Value   Patient Weight 214.3 kg (472 lb 8 oz)      CPAP Settings (Inpatient)    Flowsheet Row Most Recent Value   Mode AutoPAP   Interface Full face mask   Mask size Large   O2% 21 %         Lab Results Last 24 Hours      Scan slide [[id number]]  Resulted: 03/15/24 0719, Result status: Final result   Ordering provider: Susu Cameron NP  03/15/24 0638 Resulting lab: [facility] ([facility])    Specimen Information    Type Source Collected On   Blood — 03/15/24 0456          Components    Component Value Reference Range Flag Lab   Slide Review Slide reviewed, normal WBC, RBC, and platelet morphology. — — [facility] ([facility])            CBC With Differential With Platelet [[id number]]  Resulted: 03/15/24 0719, Result status: Final result   Ordering provider: Susu Cameron NP  03/14/24 2300 Resulting lab: [facility] ([facility])   Narrative:  The following orders were created for panel order CBC With Differential With Platelet.  Procedure                               Abnormality         Status                     ---------                               -----------         ------                     CBC W/ DIFFERENTIAL[[id number]]                              Final result                 Please view results for these tests on the individual orders.    Specimen Information    Type Source Collected On   Blood — 03/15/24 0456            Basic Metabolic Panel (8) [[id number]] (Abnormal)  Resulted: 03/15/24 0559, Result status: Final result   Ordering provider: Susu Cameron NP  03/14/24 2300 Resulting lab: [facility] ([facility])    Specimen Information    Type Source Collected On   Blood  — 03/15/24 0456          Components    Component Value Reference Range [facility]   Glucose 107 70 - 99 mg/dL [facility]   Sodium 138 136 - 145 mmol/L — [facility])   Potassium 4.0 3.5 - 5.1 mmol/L — [facility])   Chloride 106 98 - 112 mmol/L — [facility])   CO2 26.0 21.0 - 32.0 mmol/L — [facility])   Anion Gap 6 0 - 18 mmol/L — [facility])   BUN 13 9 - 23 mg/dL — [facility])   Creatinine 0.68 0.70 - 1.30 mg/dL L [facility])   BUN/CREA Ratio 19.1 10.0 - 20.0 — [facility])   Calcium, Total 8.8 8.7 - 10.4 mg/dL — [facility])   Calculated Osmolality 287 275 - 295 mOsm/kg — [facility])   eGFR-Cr 106 >=60 mL/min/1.73m2 — [facility])            Testing Performed By     Lab - Abbreviation Name Director Address Valid Date Range    162 - [facility]) [facility] ([facility]) Haris Solo M.D. [address] [place] [id number] 03/19/20 1442 - Present            Microbiology Results (All)     Procedure Component Value Units Date/Time    Blood Culture [[id number]] Collected: 03/11/24 0633    Order Status: Completed Lab Status: Preliminary result Updated: 03/15/24 0801    Specimen: Blood,peripheral      Blood Culture Result No Growth 4 Days    Narrative:      Anaerobic Bottle Volume - 7 mL  Aerobic Bottle Volume - 7 mL    Blood Culture [[id number]] Collected: 03/11/24 0657    Order Status: Completed Lab Status: Preliminary result Updated: 03/15/24 0801    Specimen: Blood,peripheral      Blood Culture Result No Growth 4 Days    MRSA Screen by PCR [[id number]]  (Normal) Collected: 03/11/24 0855    Order Status: Completed Lab Status: Final result Updated: 03/11/24 1508    Specimen: Other from Nares      MRSA Screen By PCR Negative    $$$$Respiratory Flu Expanded Panel + Covid-19$$$$ [[id number]]  (Abnormal) Collected: 03/11/24 0856    Order Status: Completed Lab Status: Final result Updated: 03/11/24 1001     Specimen: Other from Nasopharyngeal swab      SARS-CoV-2 PCR: Not Detected     Adenovirus PCR: Not Detected     Coronavirus 229E PCR: Not Detected     Coronavirus Hku1 PCR: Not Detected     Coronavirus Nl63 PCR: Not Detected     Coronavirus Oc43 PCR: Not Detected     Metapneumovirus PCR: Detected     Rhinovirus/Entero PCR: Not Detected     Influenza A PCR: Not Detected     Influenza B PCR: Not Detected     Parainfluenza 1 PCR: Not Detected     Parainfluenza 2 PCR Not Detected     Parainfluenza 3 PCR Not Detected     Parainfluenza 4 PCR Not Detected     Resp Syncytial Virus PCR Not Detected     Bordetella Pertussis PCR Not Detected     Bordetella Parapertussis PCR Not Detected     Chlamydia pneumonia PCR: Not Detected     Mycoplasma pneumonia PCR: Not Detected    Narrative:      This test is intended for the simultaneous qualitative detection and differentiation of nucleic acids from multiple viral and bacterial respiratory organisms, including nucleic acid from Severe Acute Respiratory Syndrome Coronavirus 2 (SARS-CoV-2) in nasopharyngeal swab from individuals suspected of respiratory viral infection consistent with COVID-19 by their healthcare provider.    Test performed using the NovaPlanner Respiratory Panel 2.1 (RP2.1) assay on the [employer] View 2.0 System, [employer], [employer], [place] [address].    This test is being used under the Food and Drug Administration's Emergency Use Authorization.    The authorized Fact Sheet for Healthcare Providers for this assay is available upon request from the laboratory.    SARS and MERS coronaviruses are not tested on this assay.    SARS-CoV-2/Flu A and B/RSV by PCR (GeneXpert) [[id number]]  (Normal) Collected: 03/11/24 0311    Order Status: Completed Lab Status: Final result Updated: 03/11/24 0404    Specimen: Other from Nares      SARS-CoV-2 (COVID-19) - (GeneXpert) Not Detected     Influenza A by PCR Negative     Influenza B by PCR Negative     RSV by PCR Negative     Narrative:      This test is intended for the qualitative detection and differentiation of SARS-CoV-2, influenza A, influenza B, and respiratory syncytial virus (RSV) viral RNA in nasopharyngeal or nares swabs from individuals suspected of respiratory viral infection consistent with COVID-19 by their healthcare provider. Signs and symptoms of respiratory viral infection due to SARS-CoV-2, influenza, and RSV can be similar.    Test performed using the Xpert Xpress SARS-CoV-2/FLU/RSV (real time RT-PCR)  assay on the [employer] instrument, [employer], [place] [address].   This test is being used under the Food and Drug Administration's Emergency Use Authorization.    The authorized Fact Sheet for Healthcare Providers for this assay is available upon request from the laboratory.      Pending Labs     Order Current Status    Blood Culture Preliminary result    Blood Culture Preliminary result         H&P - H&P Note      H&P signed by Johny Prince DO at 3/11/2024 12:15 PM  Version 1 of 1    Author: Johny Prince DO Service: Hospitalist Author Type: Physician    Filed: 3/11/2024 12:15 PM Date of Service: 3/11/2024  7:58 AM Status: Signed    : Johny Prince DO (Physician)    Related Notes: Original Note by Susu Cameron NP (Nurse Practitioner) filed at 3/11/2024 11:42 AM       [facility]   Hospitalist Team  History and Physical  Admit Date:  3/11/2024    Is this a shared or split note between Advanced Practice Provider and Physician? Yes    ASSESSMENT / PLAN:   Deneen Chapa is a 59 year old male with PMH significant for CHF-diastolic, insomnia, migraines, afib, secondary hypercoagulable state, insomnia, HTN, HL, DM- diet controlled, AYANNA, morbid obesity with recent admission to [facility] with  a mechanical fall with B/L Knee Pain  found to have a right tibial plateau fx and possible Left MCL strain who presents from [facility] with cough, sob and fevers found to have metapneumovirus and possible pna,  see  below for details     Fever/Cough/SOB 2/2 Metapneumovirus possible PNA   -tmax 100.8. WBC normal. PCT normal   -CXR preliminary with consolidation Left lung   -negative for influenza, covid and RSV  -bloood cx pending  -RVP + for metapneumovirus   -MRSA screen ordered   -not felt to  have pe as pt on xarelto  -IV lasix ordered with LE edema and missing lasix doses, bnp pending  -abx- cefepime and vanco  -on RA    Mechanical fall w/ shannon knee pain  Right Tibial Fx  Possible Left MCL Sprain   -work up at [facility] 1)CT of right knee-mildly comminuted and minimally displaced intra-articular fracture along the lateral tibial plateau 2) unable to get MRI's of knees do to wt 3) r/o for vascular disease with bernice  -pain meds-oxy, flexeril and tramadol  -per [facility] notes- NWB RLE (can do active and passive ROM in NWB status) and WBAT to LLE, needs to wear immobilizers on right at all times, may remove mobilizers on left leg when in bed  -follow up with Dr Cook     DM Type II  -HgbA1C 6/1/2023 6.7, repeat pending  -SSI prn  -accuchecks  -ADA diet     Atrial fibrillation with RVR  Secondary Hypercoagulable State  HTN  HL-not on meds  Chronic CHF, diastolic   -2023 echo with EF 45-50%  -tele  -CXR not suggestive of CHF but noted LE edema. BNP pending   -s/p iv cardizem 15 mg iv in ER  -resume home meds-asa, Cardizem, Toprol, lisinopril and xarelto   -Hold home Lasix and give IV Lasix with noted lower extremity edema    AYANNA  -cpap     Insomnia   -continue home meds-currently only taking trazodone    Migraines  Dizziness   -per med rec no longer taking topiramate, qulipta or eletriptan  -follows with Dr Acevedo-neurology     Morbid Obesity  -PER [facility] notes--he briefly tried metformin, has not tried any new meds or d/w PCP. Encourage a strict change in diet and if possible/qualifies/affords new injectable wt loss meds such as ozempic/wegovy/mounjaro  -recommend follow up with pcp     [facility]  -full code  -POA father     MA/ACO Reach  -Re- Entry:  2/16 discharge from [facility] to [facility]  -Consults: none  -Discharge Needs: return to [facility]  -Appointments: follow up Dr Jenna Gutierrez MD within 1 week of discharge from rehab     [facility] in am  -diet-ADA    Prophy  -SCD  -xarelto     Dispo  -pending clinical course  PCP: Jenna Gutierrez MD       Outpatient records or previous hospital records reviewed.   Further recommendations pending patient's clinical course.  [facility] hospitalist  team to continue to follow patient while in house  Concerns regarding plan of care were discussed with patient. Patient agrees with plan as detailed above. Discussed plan of care with Dr. Prince     Note: This chart was prepared using voice recognition software and may contain unintended word substitution errors.     Susu Cameron RN, NP  [facility]   Available via Perfect Serve or Bubble Chat (check Availability)    3/11/2024      HISTORY:   CC:   Chief Complaint   Patient presents with    Cough        PCP: Jenna Gutierrez MD    History of Present Illness:     Deneen Chapa is a 59  year old male with PMH significant for CHF-diastolic, insomnia, migraines, afib, secondary hypercoagulable state, insomnia, HTN, HL, DM- diet controlled, AYANNA, morbid obesity with recent admission to [facility] with  a mechanical fall with B/L Knee Pain  found to have a right tibial plateau fx and possible Left MCL strain    History obtained from patient in the emergency room.  He states that he has been progressing in therapy at [facility] from a mobility perspective and has been there about 3 weeks.  About 4 days ago he noted nonproductive cough and fever of ~99.  He states he is having difficulty breathing.  He does have a history of sleep apnea and does use CPAP.  He also has a history of congestive heart failure related to atrial fibrillation and states he has refused a few doses of his water pills however the dosages are higher than he normally takes.  Upon seeing patient in the emergency room  he he has issues with coughing.  He is on room air.  He denied any sore throat sinus pain or ear pain.  He states that he has not been around anybody that he knows that is actively sick other than the staff at the rehab center. CXR suggestive of PNA.  He has noted to be in A-fib with heart rate in the low 100s after getting 15 mg of IV push Cardizem.  He does have he states he continues to have pain in his knees and that the pain medication is not helping however they will not increase the dosages.      Review of Systems  12 point systems reviewed, please see HPI for pertinent positives, otherwise negative    OBJECTIVE:  /85   Pulse 107   Temp (!) 100.8 °F (38.2 °C) (Temporal)   Resp (!) 27   SpO2 94%     GENERAL: coughing, overwt  NEUROLOGIC: A/A; Ox3  SKIN: no rashes  RESPIRATORY: normal expansion; non labored, CTA   CARDIOVASCULAR: irregular, tachy  ABDOMEN:  Soft, BS+; non distended, non tender    EXTREMITIES: pitting edema B/L LE    PMH  Past Medical History:   Diagnosis Date    Alcohol abuse     last drink Jan 2020    Anxiety     [facility] D[facility]    Arrhythmia     Atrial fibrillation (HCC)     EP cards: Dr. Sumeet Colon, PAroxysmal AFIB    Calculus of kidney     Congestive heart failure (CHF) (HCC)     Depression     [facility] psych: Dr. Denny--currently on wellbutrin as of 3/28/22, tried zoloft and didn't help    High blood pressure     Hyperlipidemia     Morbid obesity (HCC)     Obstructive sleep apnea     Prediabetes     Sleep apnea     Vertigo     Dr. Kallie Acevedo----Mirtazepine        PSH  Past Surgical History:   Procedure Laterality Date    APPENDECTOMY      COLONOSCOPY      JAW SURGERY      OTHER SURGICAL HISTORY  02/2018, 2020    cardioversion, Dr. Sumeet Colon---2020    OTHER SURGICAL HISTORY  05/07/2019    Cysto-Dr. Timmons        ALL:  No Known Allergies     Home Medications:  Outpatient Medications Marked as Taking for the 3/11/24 encounter (Hospital Encounter)   Medication Sig Dispense Refill     acetaminophen 500 MG Oral Tab Take 2 tablets (1,000 mg total) by mouth every 8 (eight) hours as needed for Pain.      guaiFENesin 100 MG/5 ML Oral Take 5 mL (100 mg total) by mouth every 8 (eight) hours as needed (cough).      oxyCODONE-acetaminophen  MG Oral Tab Take 1 tablet by mouth every 8 (eight) hours as needed for Pain.      oxyCODONE-acetaminophen  MG Oral Tab Take 1 tablet by mouth in the morning and 1 tablet before bedtime.         Soc Hx  Social History     Tobacco Use    Smoking status: Never    Smokeless tobacco: Never   Substance Use Topics    Alcohol use: No     Comment: stopped drinking 2/2018        Fam Hx  Family History   Problem Relation Age of Onset    Aldara Father     No Known Problems Mother          DIAGNOSTIC DATA:   CBC/Chem  Recent Labs   Lab 03/11/24  0633   WBC 5.6   HGB 14.4   MCV 93.6   .0       Recent Labs   Lab 03/11/24  0633   *   K 4.1      CO2 26.0   BUN 13   CREATSERUM 0.84   *   CA 9.1         SEE ATTENDING NOTE BELOW    Patient seen and examined.  Agree with documentation as stated above    59-year-old male with multiple past medical conditions, with a recent admission to [facility] presents to the hospital for evaluation of cough, shortness of breath, fever.    Vitals:    03/11/24 1041   BP: 111/86   Pulse: 79   Resp: 24   Temp: 99.8 °F (37.7 °C)   Gen: comfortable  Chest: non tender  Lungs: clear  Heart RRR  Legs Trace to +1 edema     A/P    Cough, fever  -symptoms appear viral in nature  -human metapneumovirus noted   -on IV antibiotics.   -if mrsa screen negative can stop vancomycin  -will narrow antibiotics appropriately pending progress  -await urine culture     Chronic diastolic CHF  -agree with one dose of IV lasix today    AYANNA  -use cpap here with sleep     Remainder as stated above in NP note    Johny Prince DO            Electronically signed by Johny Prince DO on 3/11/2024 12:15 PM              Discharge Summary - D/C Summary       Discharge Summary signed by Yoselyn Mota MD at 3/15/2024 12:54 PM  Version 1 of 1    Author: Yoselyn Mota MD Service: Hospitalist Author Type: Physician    Filed: 3/15/2024 12:54 PM Date of Service: 3/15/2024 11:21 AM Status: Signed    : Yoselyn Mota MD (Physician)    Related Notes: Original Note by Susu Cameron NP (Nurse Practitioner) filed at 3/15/2024 11:27 AM       [facility] Hospitalist Discharge Summary   Patient ID:  Deneen Chapa  [id number]  59 year old  2/28/1965    Admit date: 3/11/2024  Discharge date: 3/1/2024    Primary Care Physician: Jenna Gutierrez MD   Attending Physician: Yoselyn Mota MD   Consults:     Hospital Discharge Diagnoses:   ----See D/C Summary for further Dx    Risk of Readmission Lace+ Score: 73  59-90 High Risk  29-58 Medium Risk  0-28   Low Risk.    TCM Follow-Up Recommendation:  LACE > 58: High Risk of readmission after discharge from the hospital.    Please note that only [facility] DMG and EMG patients enrolled in the Medicare ACO, [facility] ACO and [facility] HMOs will be handled by the [facility] Care Management team.  For all other patients, please follow usual protocol for discharge care transition.    Reason for admission  Deneen Chapa is a 59  year old male with PMH significant for CHF-diastolic, insomnia, migraines, afib, secondary hypercoagulable state, insomnia, HTN, HL, DM- diet controlled, AYANNA, morbid obesity with recent admission to [facility] with  a mechanical fall with B/L Knee Pain  found to have a right tibial plateau fx and possible Left MCL strain     History obtained from patient in the emergency room.  He states that he has been progressing in therapy at [facility] from a mobility perspective and has been there about 3 weeks.  About 4 days ago he noted nonproductive cough and fever of ~99.  He states he is having difficulty breathing.  He does have a history of sleep apnea and does use CPAP.  He also has a history of congestive heart failure  related to atrial fibrillation and states he has refused a few doses of his water pills however the dosages are higher than he normally takes.  Upon seeing patient in the emergency room he he has issues with coughing.  He is on room air.  He denied any sore throat sinus pain or ear pain.  He states that he has not been around anybody that he knows that is actively sick other than the staff at the rehab center. CXR suggestive of PNA.  He has noted to be in A-fib with heart rate in the low 100s after getting 15 mg of IV push Cardizem.  He does have he states he continues to have pain in his knees and that the pain medication is not helping however they will not increase the dosages.    Hospital Course:     Deneen Chapa is a 59 year old male with PMH significant for CHF-diastolic, insomnia, migraines, afib, secondary hypercoagulable state, insomnia, HTN, HL, DM- diet controlled, AYANNA, morbid obesity with recent admission to [facility] with  a mechanical fall with B/L Knee Pain  found to have a right tibial plateau fx and possible Left MCL strain who presents from [facility] with cough, sob and fevers found to have metapneumovirus and possible pna. Hospital course complicated by fibrillation with rapid ventricular response, HR improved with adjustment of cardiac meds. Metformin added for DM with HgbA1c of 7.5. pt needs wt loss endeavors. Pt has follow up with ortho on 3/18 as previously scheduled. Pt discharged to [facility], see below for details      Fever/Cough/SOB 2/2 Metapneumovirus possible PNA   -fevers resolved. WBC normal. PCT normal   -CXR preliminary with consolidation Left lung   -negative for influenza, covid and RSV  -blood cx NGTD  -RVP + for metapneumovirus   -MRSA screen negative   -prn anti tussives  -abx- completed zithromax, ceftin to complete 5 day course-EOT 3/16   -on RA     Acute on Chronic CHF, diastolic   Persistent Atrial fibrillation with RVR  Secondary Hypercoagulable State  HTN  HL-not on  meds  -2023 echo with EF 45-50%  -CXR not suggestive of CHF but noted LE edema. BNP 17  -continue home meds-asa,lasix, Toprol and xarelto   -Cardizem CD changed to cardizem  mg every 6 hours add digoxin 0.125 mg daily  -holding home lisinopril with lower blood pressures.     Mechanical fall w/ shannon knee pain  Right Tibial Fx  Possible Left MCL Sprain   -work up at [facility] 1)CT of right knee-mildly comminuted and minimally displaced intra-articular fracture along the lateral tibial plateau 2) unable to get MRI's of knees do to wt 3) r/o for vascular disease with bernice  -pain meds-pt currently using tramadol and tylenol   -per [facility] notes- NWB RLE (can do active and passive ROM in NWB status) and WBAT to LLE, needs to wear immobilizers on right at all times, may remove mobilizers on left leg when in bed  -follow up with Dr Cook,appt 3/18 scheduled     DM Type II  -HgbA1C  7.6  -metformin 500 mg daily added with SSI prn  -accuchecks  -ADA diet      AYANNA  -cpap     Insomnia   -continue home meds-currently only taking trazodone     Migraines  Dizziness   -per med rec no longer taking topiramate, qulipta or eletriptan  -follows with Dr Acevedo-neurology      Morbid Obesity  -PER [facility] notes--he briefly tried metformin, has not tried any new meds or d/w PCP. Encourage a strict change in diet and if possible/qualifies/affords new injectable wt loss meds such as ozempic/wegovy/mounjaro  -recommend follow up with pcp      [facility]  -full code  -POA father      MA/ACO Reach  -Re- Entry: 2/16 discharge from [facility] to [facility]  -Consults: none  -Discharge Needs: return to [facility] at [facility]  -Appointments: follow up Dr Jenna Gutierrez MD within 1 week of discharge from rehab        EXAM:   GENERAL: no apparent distress  NEURO: A/A Ox3  RESP: non labored, CTA  CARDIO: Regular, no murmur  ABD: soft, NT, ND, BS+  EXTREMITIES: brace to leg, trace le edema     Discharge Instructions     Medication List        START taking these medications       benzonatate 200 MG Caps  Commonly known as: Tessalon     cefuroxime 500 MG Tabs  Commonly known as: Ceftin  Take 1 tablet (500 mg total) by mouth every 12 (twelve) hours for 3 doses.     digoxin 0.125 MG Tabs  Commonly known as: Lanoxin     dilTIAZem HCl 120 MG Tabs  Commonly known as: CARDIZEM     insulin aspart 100 Units/mL Sopn  Commonly known as: NovoLOG     metFORMIN 500 MG Tabs  Commonly known as: Glucophage            CHANGE how you take these medications      acetaminophen 325 MG Tabs  Commonly known as: Tylenol  What changed:   medication strength  how much to take  when to take this  reasons to take this            CONTINUE taking these medications      aspirin 81 MG Tbec     furosemide 20 MG Tabs  Commonly known as: Lasix     guaiFENesin 100 MG/5 ML  Commonly known as: Robitussin     metoprolol succinate  MG Tb24  Commonly known as: Toprol XL     Polyethylene Glycol 3350 17 g Pack  Commonly known as: MIRALAX     rivaroxaban 20 MG Tabs  Commonly known as: Xarelto     Sennosides 17.2 MG Tabs     traMADol 50 MG Tabs  Commonly known as: Ultram  Take 2 tablets (100 mg total) by mouth every 8 (eight) hours as needed for Pain.     traZODone 100 MG Tabs  Commonly known as: Desyrel            STOP taking these medications      cyclobenzaprine 10 MG Tabs  Commonly known as: Flexeril     dilTIAZem HCl ER Coated Beads 360 MG Cp24  Commonly known as: CARDIZEM CD     lisinopril 5 MG Tabs  Commonly known as: Prinivil; Zestril     Magnesium 100 MG Caps     Naloxone HCl 4 MG/0.1ML Liqd     oxyCODONE-acetaminophen  MG Tabs  Commonly known as: Percocet               Where to Get Your Medications        You can get these medications from any pharmacy    Bring a paper prescription for each of these medications  cefuroxime 500 MG Tabs  traMADol 50 MG Tabs       Code Status: Full Code    Important follow up:   Follow-up Information       Jenna Gutierrez MD Follow up.    Specialty: Internal Medicine  Why: within 1  week of discharge from rehab  Contact information:  [address]  [address]  [facility] [address]  [phone]               Sumeet Cao MD Follow up on 3/18/2024.    Specialty: SURGERY, ORTHOPEDIC  Why: 3/18 9:45am  Contact information:  [address]  [address]  [facility] [address]  [phone]                           Disposition: SNF  Discharged Condition: stable      Additional patient instructions:  Complete course of oral antibiotics for pneumonia    Pt has diabetes with HgbA1C of 7.6,. recommend wt loss and ada diet. Metformin 500 mg daily added plus ssi prn, adjust as needed    Pt has follow up ortho appt on 3/18 in [place] as previously scheduled   =========================================================================================================================    I Reconciled current and discharge medications on day of discharge  Patient had opportunity to ask questions and state understand and agree with therapeutic plan as outlined    Total Time Coordinating Care: greater than 30 minutes  Is this a shared or split note between Advanced Practice Provider and Physician? Yes    Note: This chart was prepared using voice recognition software and may contain unintended word substitution errors.     Susu Cameron RN, NP   [facility] Hospitalist Team   3/15/2024      SEE ATTENDING NOTE BELOW      Patient examined and assessed independently. Agree with above APN assessment.     Hospital course: Deenen Chapa is a 59 year old male with PMH significant for CHF-diastolic, insomnia, migraines, afib, secondary hypercoagulable state, insomnia, HTN, HL, DM- diet controlled, AYANNA, morbid obesity with recent admission to [facility] with  a mechanical fall with B/L Knee Pain  found to have a right tibial plateau fx and possible Left MCL strain who presents from [facility] with cough, sob and fevers found to have metapneumovirus and possible pna. Hospital course complicated by fibrillation with  rapid ventricular response, HR improved with adjustment of cardiac meds. Metformin added for DM with HgbA1c of 7.5. pt needs wt loss endeavors. Pt has follow up with ortho on 3/18 as previously scheduled. Pt discharged to [facility], see below for details     Objective  /77 (BP Location: Right arm)   Pulse 81   Temp 98.1 °F (36.7 °C) (Oral)   Resp 20   Ht 5' 10\" (1.778 m)   Wt (!) 472 lb 8 oz (214.3 kg)   SpO2 90%   BMI 67.80 kg/m²     Exam:  GEN: morbidly obese male in NAD  HEENT: EOMI  Pulm: CTAB, no crackles or wheezes  CV: RRR, no murmurs  ABD: Soft, non-tender, non-distended, +BS  SKIN: warm, dry  EXT: + edema      Assessment/Plan     Deneen Chapa is a 59 year old male with PMH significant for CHF-diastolic, insomnia, migraines, afib, secondary hypercoagulable state, insomnia, HTN, HL, DM- diet controlled, AYANNA, morbid obesity with recent admission to [facility] with  a mechanical fall with B/L Knee Pain  found to have a right tibial plateau fx and possible Left MCL strain who presents from [facility] with cough, sob and fevers found to have metapneumovirus and possible pna. Hospital course complicated by fibrillation with rapid ventricular response, HR improved with adjustment of cardiac meds. Metformin added for DM with HgbA1c of 7.5. pt needs wt loss endeavors. Pt has follow up with ortho on 3/18 as previously scheduled. Plans for return to [facility] for [facility] oce insurance auth obtained, see below for details      Fever/Cough/SOB 2/2 Metapneumovirus possible PNA   Acute on Chronic CHF, diastolic   Persistent Atrial fibrillation with RVR  Secondary Hypercoagulable State  HTN  HL-not on meds  Mechanical fall w/ shannon knee pain  Right Tibial Fx  Possible Left MCL Sprain   DM Type II  AYANNA  Insomnia   Migraines  Dizziness   Morbid Obesity  [facility]        Plan  - stable for discharge to SNF today     Rest as above.     Yoselyn Mota MD  DMG hospitalist        Electronically signed by Yoselyn Mota MD on  3/15/2024 12:54 PM           Imaging Results (HF patients)    Chest X-Ray Results (HF patients only)    No exam resulted this encounter.      2D Echocardiogram Results (HF patients only)    No exam resulted this encounter.      Cath Angiogram Results (HF Patients only)    No exam resulted this encounter.          Physical Therapy Notes (last 72 hours)      Physical Therapy Note signed by Adrianne Grace PT at 3/15/2024 10:29 AM  Version 1 of 1    Author: Adrianne Grace PT Service: Rehab Author Type: Physical Therapist    Filed: 3/15/2024 10:29 AM Date of Service: 3/15/2024 10:22 AM Status: Signed    : Adrianne Grace PT (Physical Therapist)       PHYSICAL THERAPY TREATMENT NOTE - INPATIENT     Room Number: 519/519-A       Presenting Problem: pneumonia  Co-Morbidities : Hx migraines, a fib, DM2  h/o fall with admit to [facility] 2/10-2/16/24 with resultant right tibial plateau fx, left MCL sprain, went to [facility]. h/o DM, afib, CHF, obesity      Problem List  Active Problems:    Acute bronchitis due to human metapneumovirus      PHYSICAL THERAPY ASSESSMENT   Patient demonstrates fair progress this session, goals  remain in progress.    Patient continues to function below baseline with bed mobility, transfers, and gait.  Contributing factors to remaining limitations include decreased functional strength, decreased endurance/aerobic capacity, and medical status.  Next session anticipate patient to progress transfers and gait.  Physical Therapy will continue to follow patient for duration of hospitalization.    Patient continues to benefit from continued skilled PT services: to promote return to prior level of function and safety with continuous assistance and gradual rehabilitative therapy .    PLAN  PT Treatment Plan: Bed mobility;Endurance;Energy conservation;Patient education;Strengthening;Range of motion;Transfer training  Frequency (Obs): 3-5x/week    SUBJECTIVE  Pt reports he will be going to  rehab  \"I can't stand with this brace on\" referring to knee immobilizer, pt is also NWB right LE    OBJECTIVE  Precautions: Knee immobilizer (R KI at all times, okay to remove L KI)    WEIGHT BEARING RESTRICTION        R Lower Extremity: Non-Weight Bearing  L Lower Extremity: Weight Bearing as Tolerated    PAIN ASSESSMENT   Rating: Unable to rate  Location: right LE with activity  Management Techniques: Repositioning    BALANCE  Static Sitting: Good  Dynamic Sitting: Good  Static Standing: Dependent  Dynamic Standing: Not tested    ACTIVITY TOLERANCE                          O2 WALK       AM-PAC '6-Clicks' INPATIENT SHORT FORM - BASIC MOBILITY  How much difficulty does the patient currently have...  Patient Difficulty: Turning over in bed (including adjusting bedclothes, sheets and blankets)?: A Little   Patient Difficulty: Sitting down on and standing up from a chair with arms (e.g., wheelchair, bedside commode, etc.): Unable   Patient Difficulty: Moving from lying on back to sitting on the side of the bed?: A Little   How much help from another person does the patient currently need...   Help from Another: Moving to and from a bed to a chair (including a wheelchair)?: Total   Help from Another: Need to walk in hospital room?: Total   Help from Another: Climbing 3-5 steps with a railing?: Total     AM-PAC Score:  Raw Score: 10   Approx Degree of Impairment: 76.75%   Standardized Score (AM-PAC Scale): 32.29   CMS Modifier (G-Code): CL    FUNCTIONAL ABILITY STATUS  Functional Mobility/Gait Assessment  Gait Assistance: Not tested (unable to come to full standing NWB right LE)  Distance (ft): na  Rolling: min a  Supine to Sit: contact guard assist  Sit to Supine: minimal assist  Sit to Stand:  attempted, pt unable to come to full standing, NWB right LE with KI in place    Additional information: Pt ok to see per rn, pt recd in supine, educated in role of PT, goals for session, importance of consistent mobility.    Pt  is alert and oriented x 4, able to follow all commands. Pt performed functional mobility indicated above.  As pt unable to come to full standing with attempts, worked on seated pushing up strengthening bilat UE, left LE. Pt with good tolerance for sitting, UE work.    Pt returned to supine end of session, made comfortable.  KI remained in place. Pt issued theraband for UE therex, OT addressing education.     The patient's Approx Degree of Impairment: 76.75% has been calculated based on documentation in the [facility] '6 clicks' Inpatient Daily Activity Short Form.  Research supports that patients with this level of impairment may benefit from rehab.    Final disposition will be made by interdisciplinary medical team.      Patient End of Session: In bed;Needs met;Call light within reach;RN aware of session/findings;All patient questions and concerns addressed    CURRENT GOALS   Goals to be met by: 3/30/24  Patient Goal Patient's self-stated goal is: to be able to stand, walk   Goal #1 Patient is able to demonstrate supine - sit EOB @ level: modified independent      Goal #1   Current Status     Goal #2 Patient is able to demonstrate transfers Sit to/from Stand at assistance level: moderate assistance with walker - rolling NWB right LE      Goal #2  Current Status     Goal #3 Patient is able to ambulate 3 feet with assist device: walker - rolling at assistance level: moderate assistance   Goal #3   Current Status     Goal #4     Goal #4   Current Status     Goal #5 Patient to demonstrate independence with home activity/exercise instructions provided to patient in preparation for discharge.   Goal #5   Current Status     Goal #6     Goal #6  Current Status      Therapeutic Activity: 30 minutes         Physical Therapy Note signed by Adrianne Grace PT at 3/13/2024 10:19 AM  Version 1 of 1    Author: Adrianne Grace PT Service: Rehab Author Type: Physical Therapist    Filed: 3/13/2024 10:19 AM Date of Service: 3/13/2024  10:08 AM Status: Signed    : Adrianne Grace, PT (Physical Therapist)       PHYSICAL THERAPY EVALUATION - INPATIENT     Room Number: 519/519-A  Evaluation Date: 3/13/2024  Type of Evaluation: Initial   Physician Order: PT Eval and Treat    Presenting Problem: pneumonia  Co-Morbidities : Hx migraines, a fib, DM2  Reason for Therapy: Mobility Dysfunction and Discharge Planning    PHYSICAL THERAPY ASSESSMENT   Patient is a 59 year old male admitted 3/11/2024 for SOB, cough, diagnosed with pneumonia. See above for history, admitted from [facility].  Prior to previous admission to [facility], patient's baseline is independent, lives alone, did not use assistive device.  Patient is currently functioning below baseline with bed mobility, transfers, and gait.  Patient is requiring maximum assist as a result of the following impairments: decreased functional strength, decreased endurance/aerobic capacity, pain, and medical status.  Physical Therapy will continue to follow for duration of hospitalization.    Patient will benefit from continued skilled PT Services to promote return to prior level of function and safety with continuous assistance and gradual rehabilitative therapy .    PLAN  PT Treatment Plan: Bed mobility;Endurance;Energy conservation;Patient education;Strengthening;Range of motion;Transfer training  Rehab Potential : Good  Frequency (Obs): 3-5x/week    PHYSICAL THERAPY MEDICAL/SOCIAL HISTORY   History related to current admission:  Pt admitted from [facility] due to cough, diagnosed with pneumonia.   From Dr. Cao via Epic chat \"Definitely needs to maintain RLE NWB and knee immobilizer. Left knee can progress WB and if more stable could progress out of the knee immobilizer at this time. If still unstable when up and on the extremity should continue in the immobilizer. \"                  Problem List  Principal Problem:    Hospital-acquired pneumonia  Active Problems:    Bronchospasm      HOME  SITUATION  Home Situation  Type of Home: Skilled nursing facility (Admitted from [facility]; from apartment alone without stairs at baseline)  Home Layout: One level  Lives With: Staff 24 hours  Drives: Yes  Patient Owned Equipment: None  Patient Regularly Uses: Glasses     Prior Level of Jay: pt reports ind prior to admit to EDW2/10/24, pt reports completely independent with all fxal mobility    SUBJECTIVE  \"I have been standing on both legs without these braces at [facility]\"  Discussed at length ortho MD current orders, pt agreeable and expresses understanding    PHYSICAL THERAPY EXAMINATION   OBJECTIVE  Precautions: Bed/chair alarm;Knee immobilizer (R KI at all times, okay to remove L KI)  From Dr. Cao via Epic chat 3/12/24 \"Definitely needs to maintain RLE NWB and knee immobilizer. Left knee can progress WB and if more stable could progress out of the knee immobilizer at this time. If still unstable when up and on the extremity should continue in the immobilizer. \"              Fall Risk: High fall risk     WEIGHT BEARING RESTRICTION  Weight Bearing Restriction: L lower extremity;R lower extremity      , if unstable, KI left  R Lower Extremity: Non-Weight Bearing (with KI at all tmies)  L Lower Extremity: Weight Bearing as Tolerated    PAIN ASSESSMENT  Rating: Unable to rate  Location: right knee  Management Techniques: Repositioning    COGNITION  Overall Cognitive Status:  WFL - within functional limits    RANGE OF MOTION AND STRENGTH ASSESSMENT  Upper extremity ROM and strength are within functional limits   Lower extremity ROM is within functional limits except bilat knees, KI donned to right LE in supine, KI in room for left LE  Lower extremity strength is within functional limits except bilat knees    BALANCE  Static Sitting: Good  Dynamic Sitting: Good  Static Standing: Not tested  Dynamic Standing: Not tested    ADDITIONAL TESTS                                    NEUROLOGICAL FINDINGS                       ACTIVITY TOLERANCE  Pulse: (!) 152 (124-152 bpm with activity, sitting up eob)  Heart Rate Source: Monitor     BP: (!) 132/96  BP Location: Right arm  BP Method: Automatic  Patient Position: Sitting    O2 WALK  Oxygen Therapy  SPO2% on Room Air at Rest: 91    AM-PAC '6-Clicks' INPATIENT SHORT FORM - BASIC MOBILITY  How much difficulty does the patient currently have...  Patient Difficulty: Turning over in bed (including adjusting bedclothes, sheets and blankets)?: A Little   Patient Difficulty: Sitting down on and standing up from a chair with arms (e.g., wheelchair, bedside commode, etc.): Unable   Patient Difficulty: Moving from lying on back to sitting on the side of the bed?: A Little   How much help from another person does the patient currently need...   Help from Another: Moving to and from a bed to a chair (including a wheelchair)?: Total   Help from Another: Need to walk in hospital room?: Total   Help from Another: Climbing 3-5 steps with a railing?: Total     AM-PAC Score:  Raw Score: 10   Approx Degree of Impairment: 76.75%   Standardized Score (AM-PAC Scale): 32.29   CMS Modifier (G-Code): CL    FUNCTIONAL ABILITY STATUS  Functional Mobility/Gait Assessment  Gait Assistance: Not tested (pt unable to stand this session)  Distance (ft): roll  Rolling: stand-by assist  Supine to Sit: contact guard assist  Sit to Supine: minimal assist  Sit to Stand:  attempted, pt unable to stand to rw    Exercise/Education Provided:  Bed mobility  Energy conservation  Functional activity tolerated  Transfer training    The patient's Approx Degree of Impairment: 76.75% has been calculated based on documentation in the [facility] '6 clicks' Inpatient Basic Mobility Short Form.  Research supports that patients with this level of impairment may benefit from inpatient rehab.  Final disposition will be made by interdisciplinary medical team.    Patient End of Session: In bed;Needs met;Call light within reach;RN aware  of session/findings;All patient questions and concerns addressed    CURRENT GOALS  Goals to be met by: 3/30/24  Patient Goal Patient's self-stated goal is: to be able to stand, walk   Goal #1 Patient is able to demonstrate supine - sit EOB @ level: modified independent     Goal #1   Current Status    Goal #2 Patient is able to demonstrate transfers Sit to/from Stand at assistance level: moderate assistance with walker - rolling NWB right LE     Goal #2  Current Status    Goal #3 Patient is able to ambulate 3 feet with assist device: walker - rolling at assistance level: moderate assistance   Goal #3   Current Status    Goal #4    Goal #4   Current Status    Goal #5 Patient to demonstrate independence with home activity/exercise instructions provided to patient in preparation for discharge.   Goal #5   Current Status    Goal #6    Goal #6  Current Status      Patient Evaluation Complexity Level:  History Moderate - 1 or 2 personal factors and/or co-morbidities   Examination of body systems Moderate - addressing a total of 3 or more elements   Clinical Presentation  Moderate - Evolving   Clinical Decision Making  Moderate Complexity       Therapeutic Activity:  30 minutes  Can add/delete any of these       Physical Therapy Note signed by Aretha Morrow PT at 3/12/2024  2:55 PM  Version 1 of 1    Author: Aretha Morrow PT Service: — Author Type: Physical Therapist    Filed: 3/12/2024  2:55 PM Date of Service: 3/12/2024  2:52 PM Status: Signed    : Aretha Morrow PT (Physical Therapist)       Chart reviewed, patient has been getting up without maintaining NWB and has not been wearing knee immobilizers. Explained to patient physical therapy order states that he needs to be NWB on right leg and WBAT in left with knee immobilizers (confirmed via ortho MD via Fiz chat). Reached out to ortho MD to confirm, see prior note from this author. Message out to social work to see if we can coordinate having  knee immobilizers brought to hospital. Patient understands why therapy not able to work with him at this time, agreeable to therapist reaching out to ortho MD and explaining the situation. Will follow up with patient tomorrow pending if knee immobilizers have arrived.        Physical Therapy Note signed by Aretha Morrow PT at 3/12/2024  2:50 PM  Version 1 of 1    Author: Aretha Morrow PT Service: — Author Type: Physical Therapist    Filed: 3/12/2024  2:50 PM Date of Service: 3/12/2024  2:49 PM Status: Signed    : Aretha Morrow PT (Physical Therapist)       From Dr. Cao via Epic chat \"Definitely needs to maintain RLE NWB and knee immobilizer. Left knee can progress WB and if more stable could progress out of the knee immobilizer at this time. If still unstable when up and on the extremity should continue in the immobilizer. \" PT OT working on getting knee immobilizers for patient.                 Occupational Therapy Notes (last 72 hours)      Occupational Therapy Note signed by Elizabeth Isabel OT at 3/15/2024  1:51 PM  Version 1 of 1    Author: Elizabeth Isabel OT Service: — Author Type: Occupational Therapist    Filed: 3/15/2024  1:51 PM Date of Service: 3/15/2024  9:28 AM Status: Signed    : Elizabeth Isabel OT (Occupational Therapist)       OCCUPATIONAL THERAPY TREATMENT NOTE - INPATIENT        Room Number: 519/519-A     Presenting Problem: hospital acquired PNA, sustained R tibial fx and L MCL sprain after fall, R LE NWB with KI at all times (AROM/PROM okay), L LE WBAT (okay to remove immobilizer)    Problem List  Active Problems:    Acute bronchitis due to human metapneumovirus      OCCUPATIONAL THERAPY ASSESSMENT   Patient demonstrates limited progress this session, goals remain in progress.    Patient continues to function below baseline with ADLs and fx mobility/transfers.   Contributing factors to remaining limitations include decreased functional strength, decreased  functional reach, decreased endurance, pain, impaired balance, decreased muscular endurance, difficulty maintaining precautions, and body habitus. Next session anticipate patient to progress transfers, static standing balance, and functional standing tolerance.  Occupational Therapy will continue to follow patient for duration of hospitalization.    Patient continues to benefit from continued skilled OT services: to promote return to prior level of function and safety with continuous assistance and gradual rehabilitative therapy . Remains unsafe to return home at this time.    PLAN  OT Treatment Plan: Balance activities;Energy conservation/work simplification techniques;ADL training;Functional transfer training;Endurance training;Patient/Family education;Patient/Family training;Equipment eval/education;Compensatory technique education  OT Device Recommendations: TBD    SUBJECTIVE  \"I'm sorry I couldn't do it.\"    OBJECTIVE  Precautions: Knee immobilizer (R KI at all times, okay to remove L KI)    WEIGHT BEARING RESTRICTION  R Lower Extremity: Non-Weight Bearing  L Lower Extremity: Weight Bearing as Tolerated    PAIN ASSESSMENT  Rating: -- (not rated)  Location: R LE  Management Techniques: Activity promotion; Body mechanics; Repositioning    ACTIVITIES OF DAILY LIVING ASSESSMENT  AM-PAC ‘6-Clicks’ Inpatient Daily Activity Short Form  How much help from another person does the patient currently need…  -   Putting on and taking off regular lower body clothing?: A Lot  -   Bathing (including washing, rinsing, drying)?: A Lot  -   Toileting, which includes using toilet, bedpan or urinal? : Total  -   Putting on and taking off regular upper body clothing?: A Lot  -   Taking care of personal grooming such as brushing teeth?: A Little  -   Eating meals?: A Little    AM-PAC Score:  Score: 13  Approx Degree of Impairment: 63.03%  Standardized Score (AM-PAC Scale): 32.03  CMS Modifier (G-Code): CL    BED MOBILITY  Supine to  Sit: SUP   Rolling: SUP to facilitate changing of linens    FUNCTIONAL TRANSFER ASSESSMENT  Attempted Sit to Stand from EOB: Unable to perform while adhering to R LE NWB and wearing KI  10x push-ups from EOB. Patient unable to clear bottom from bed while adhering to R LE NWB.    FUNCTIONAL ADL ASSESSMENT  UB Dressing: setup assist for donning gown while seated at EOB  LB Dressing: max assist for donning R KI at bed-level    EDUCATION PROVIDED  Patient: Role of Occupational Therapy; Plan of Care; Discharge Recommendations; Functional Transfer Techniques; Fall Prevention; Posture/Positioning; Proper Body Mechanics  Patient's Response to Education: Verbalized Understanding; Returned Demonstration    The patient's Approx Degree of Impairment: 63.03% has been calculated based on documentation in the [facility] '6 clicks' Inpatient Daily Activity Short Form.  Research supports that patients with this level of impairment may benefit from rehab.  Final disposition will be made by interdisciplinary medical team.    Patient End of Session: Up in chair;Call light within reach;Needs met;RN aware of session/findings;All patient questions and concerns addressed;Bracing education provided per handout    OT Goals:  Patient self-stated goal is: none stated     Patient will complete functional transfer with Max A x1  Comment: ongoing    Patient will tolerate standing for 1-2 min in prep for adls with Max A x1  Comment: ongoing    Patient will complete oral/facial grooming task in supported sitting with Setup Assist  Comment: MET            Goals  on: 3/27/24  Frequency: 3-5x/week    OT Session Time  Therapeutic Activity: 23 minutes    SHOAIB Roca/L  [facility]  #[id number]         Occupational Therapy Note signed by Elizabeth Isabel OT at 3/13/2024 12:41 PM  Version 1 of 1    Author: Elizabeth Isabel OT Service: — Author Type: Occupational Therapist    Filed: 3/13/2024 12:41 PM Date of Service: 3/13/2024  9:58  AM Status: Signed    : Elizabeth Isabel OT (Occupational Therapist)       OCCUPATIONAL THERAPY EVALUATION - INPATIENT     Room Number: 519/519-A  Evaluation Date: 3/13/2024  Type of Evaluation: Initial  Presenting Problem: hospital acquired PNA, sustained R tibial fx and L MCL sprain after fall, R LE NWB with KI at all times (AROM/PROM okay), L LE WBAT (okay to remove immobilizer)  Hx migraines, a fib, DM2     Physician Order: IP Consult to Occupational Therapy  Reason for Therapy: ADL/IADL Dysfunction and Discharge Planning    OCCUPATIONAL THERAPY ASSESSMENT   Patient is a 59 year old male admitted 3/11/2024 for hospital acquired PNA, sustained R tibial fx and L MCL sprain after fall, R LE NWB with KI at all times (AROM/PROM okay), L LE WBAT (okay to remove immobilizer). At baseline, patient is indep with I/ADLs, including driving, and with fx mobility/transfers without a device.  Patient is currently functioning below baseline with  I/ADLs and fx mobility/transfers . Patient is requiring  up to max assist  as a result of the following impairments: decreased functional strength, decreased functional reach, decreased endurance, pain, impaired balance, decreased muscular endurance, and difficulty maintaining precautions. Occupational Therapy will continue to follow for duration of hospitalization.    Patient will benefit from continued skilled OT Services to promote return to prior level of function and safety with continuous assistance and gradual rehabilitative therapy     PLAN  OT Treatment Plan: Balance activities;Energy conservation/work simplification techniques;ADL training;Functional transfer training;Endurance training;Patient/Family education;Patient/Family training;Equipment eval/education;Compensatory technique education  OT Device Recommendations: TBD    OCCUPATIONAL THERAPY MEDICAL/SOCIAL HISTORY   Problem List   Principal Problem:    Hospital-acquired pneumonia  Active Problems:     Bronchospasm    HOME SITUATION  Type of Home: Skilled nursing facility ([facility]mitted from [facility]; from apartment alone without stairs at baseline)  Home Layout: One level  Lives With: Staff 24 hours  Drives: Yes  Patient Regularly Uses: Glasses  Assistive Device(s) Used: None     Prior Level of [place]: Independent with I/ADLs, including driving, and with fx mobility/transfers without a device.    SUBJECTIVE  \"I was completely independent before this.\"    OCCUPATIONAL THERAPY EXAMINATION   OBJECTIVE  Precautions: Bed/chair alarm; Knee immobilizer (R KI at all times, okay to remove L KI)  Fall Risk: High fall risk    WEIGHT BEARING RESTRICTION  R Lower Extremity: Non-Weight Bearing (with KI at all tmies)  L Lower Extremity: Weight Bearing as Tolerated    PAIN ASSESSMENT  Rating: -- (not rated)  Location: R LE  Management Techniques: Body mechanics; Activity promotion; Repositioning; Nurse notified    ACTIVITY TOLERANCE  Pulse: (!) 152 (up to, with bed mobility)  Heart Rate Source: Monitor     BP: (!) 132/96  BP Location: Right arm  BP Method: Automatic  Patient Position: Sitting    O2 SATURATIONS  Oxygen Therapy  SPO2% on Room Air at Rest: 91 (at EOB)    COGNITION  Overall Cognitive Status:  WFL - within functional limits    RANGE OF MOTION   Upper extremity ROM is within functional limits     STRENGTH ASSESSMENT  Upper extremity strength is within functional limits     ACTIVITIES OF DAILY LIVING ASSESSMENT  AM-PAC ‘6-Clicks’ Inpatient Daily Activity Short Form  How much help from another person does the patient currently need…  -   Putting on and taking off regular lower body clothing?: A Lot  -   Bathing (including washing, rinsing, drying)?: A Lot  -   Toileting, which includes using toilet, bedpan or urinal? : Total  -   Putting on and taking off regular upper body clothing?: A Lot  -   Taking care of personal grooming such as brushing teeth?: A Little  -   Eating meals?: A Little    AM-PAC Score:  Score:  13  Approx Degree of Impairment: 63.03%  Standardized Score (AM-PAC Scale): 32.03  CMS Modifier (G-Code): CL    BED MOBILITY  Supine <> Sit: SUP  Rolling: SUP to facilitate changing of bed linens    FUNCTIONAL TRANSFER ASSESSMENT  Attempted Sit to Stand from EOB: Unable to perform while adhering to R LE NWB    FUNCTIONAL ADL ASSESSMENT  Eating: setup assist (per obs)   Grooming: setup assist (per obs)   UB Dressing: setup assist for donning gown while seated at EOB  LB Dressing: max assist for donning R KI at bed-level  Toileting: max assist (per obs)     EDUCATION PROVIDED  Patient: Role of Occupational Therapy; Plan of Care; Discharge Recommendations; Functional Transfer Techniques; Fall Prevention; Weight Bear Status; Posture/Positioning; Bracing Education Provided; Proper Body Mechanics  Patient's Response to Education: Verbalized Understanding; Returned Demonstration    The patient's Approx Degree of Impairment: 63.03% has been calculated based on documentation in the [facility] '6 clicks' Inpatient Daily Activity Short Form.  Research supports that patients with this level of impairment may benefit from rehab.  Final disposition will be made by interdisciplinary medical team.    Patient End of Session: In bed;Call light within reach;Needs met;RN aware of session/findings;All patient questions and concerns addressed;Bracing education provided per handout    OT Goals  Patient self-stated goal is: none stated     Patient will complete functional transfer with Max A x1  Comment:     Patient will tolerate standing for 1-2 min in prep for adls with Max A x1  Comment:    Patient will complete oral/facial grooming task in supported sitting with Setup Assist  Comment:            Goals  on: 3/27/24  Frequency: 3-5x/week    Patient Evaluation Complexity Level:   Occupational Profile/Medical History MODERATE - Expanded review of history including review of medical or therapy record   Specific performance deficits  impacting engagement in ADL/IADL MODERATE  3 - 5 performance deficits   Client Assessment/Performance Deficits MODERATE - Comorbidities and min to mod modifications of tasks    Clinical Decision Making MODERATE - Analysis of occupational profile, detailed assessments, several treatment options    Overall Complexity MODERATE     OT Session Time  Therapeutic Activity: 39 minutes    Elizabeth Isabel OTR/L  [facility]  #[id number]                 Video Swallow Study Notes    No notes of this type exist for this encounter.     SLP Notes    No notes of this type exist for this encounter.     Immunizations     Name Date      Covid-19 Pfizer 03/18/21     Covid-19 Pfizer 02/25/21     Fluad 0.5ml 01/13/21     INFLUENZA 10/18/21     INFLUENZA 01/13/21     INFLUENZA 09/30/19     INFLUENZA 03/11/19     INFLUENZA 12/27/17     INFLUENZA defer-12/12/17     Deferral:      INFLUENZA 09/22/17     Influenza Virus Vaccine - Whole 03/11/19     Influenza Virus Vaccine - Whole 09/22/17     Pneumococcal (Prevnar 13) 01/13/21     Pneumovax 23 09/22/17       Multidisciplinary Problems     Active Goals        Problem: Patient/Family Goals    Goal Priority Disciplines Outcome Interventions   Patient/Family Long Term Goal     Interdisciplinary Progressing    Description: Patient's Long Term Goal: discharge    Interventions:  - Monitor vital signs, labs, test results  - Monitor blood glucose levels  - Oxygen and respiratory therapy as needed  - Pain management  - Follow MD orders  - Administer medications per MD order  - Diagnostics per order  - Update /  inform patient on plan of care  - Discharge planning  - See additional Care Plan goals for specific interventions   Patient/Family Short Term Goal     Interdisciplinary Progressing    Description: Patient's Short Term Goal: to breath better and resolve cough    Interventions:   - Monitor vital signs, labs, test results  - Oxygen and respiratory therapy as needed  - Follow MD orders  -  Administer medications per MD order    - See additional Care Plan goals for specific interventions